# Patient Record
Sex: FEMALE | Race: WHITE | NOT HISPANIC OR LATINO | Employment: OTHER | URBAN - METROPOLITAN AREA
[De-identification: names, ages, dates, MRNs, and addresses within clinical notes are randomized per-mention and may not be internally consistent; named-entity substitution may affect disease eponyms.]

---

## 2018-03-09 DIAGNOSIS — Z01.812 PRE-PROCEDURAL LABORATORY EXAMINATION: ICD-10-CM

## 2018-03-09 DIAGNOSIS — Z01.810 PRE-OPERATIVE CARDIOVASCULAR EXAMINATION: ICD-10-CM

## 2018-03-09 LAB
ANION GAP SERPL CALC-SCNC: 7 MMOL/L (ref 0–11.9)
BUN SERPL-MCNC: 21 MG/DL (ref 8–22)
CALCIUM SERPL-MCNC: 9.3 MG/DL (ref 8.5–10.5)
CHLORIDE SERPL-SCNC: 101 MMOL/L (ref 96–112)
CO2 SERPL-SCNC: 31 MMOL/L (ref 20–33)
CREAT SERPL-MCNC: 0.59 MG/DL (ref 0.5–1.4)
ERYTHROCYTE [DISTWIDTH] IN BLOOD BY AUTOMATED COUNT: 44.8 FL (ref 35.9–50)
EST. AVERAGE GLUCOSE BLD GHB EST-MCNC: 140 MG/DL
GLUCOSE SERPL-MCNC: 130 MG/DL (ref 65–99)
HBA1C MFR BLD: 6.5 % (ref 0–5.6)
HCT VFR BLD AUTO: 43.4 % (ref 37–47)
HGB BLD-MCNC: 14.5 G/DL (ref 12–16)
MCH RBC QN AUTO: 31.9 PG (ref 27–33)
MCHC RBC AUTO-ENTMCNC: 33.4 G/DL (ref 33.6–35)
MCV RBC AUTO: 95.4 FL (ref 81.4–97.8)
PLATELET # BLD AUTO: 258 K/UL (ref 164–446)
PMV BLD AUTO: 11.8 FL (ref 9–12.9)
POTASSIUM SERPL-SCNC: 3.6 MMOL/L (ref 3.6–5.5)
RBC # BLD AUTO: 4.55 M/UL (ref 4.2–5.4)
SODIUM SERPL-SCNC: 139 MMOL/L (ref 135–145)
WBC # BLD AUTO: 6.2 K/UL (ref 4.8–10.8)

## 2018-03-09 PROCEDURE — 85027 COMPLETE CBC AUTOMATED: CPT

## 2018-03-09 PROCEDURE — 80048 BASIC METABOLIC PNL TOTAL CA: CPT

## 2018-03-09 PROCEDURE — 83036 HEMOGLOBIN GLYCOSYLATED A1C: CPT

## 2018-03-09 PROCEDURE — 36415 COLL VENOUS BLD VENIPUNCTURE: CPT

## 2018-03-09 PROCEDURE — 93005 ELECTROCARDIOGRAM TRACING: CPT

## 2018-03-09 PROCEDURE — 93010 ELECTROCARDIOGRAM REPORT: CPT | Performed by: INTERNAL MEDICINE

## 2018-03-10 LAB — EKG IMPRESSION: NORMAL

## 2018-03-13 ENCOUNTER — HOSPITAL ENCOUNTER (OUTPATIENT)
Facility: MEDICAL CENTER | Age: 83
End: 2018-03-13
Attending: OPHTHALMOLOGY | Admitting: OPHTHALMOLOGY
Payer: MEDICARE

## 2018-03-13 VITALS
OXYGEN SATURATION: 93 % | BODY MASS INDEX: 28.57 KG/M2 | HEIGHT: 64 IN | HEART RATE: 75 BPM | WEIGHT: 167.33 LBS | RESPIRATION RATE: 14 BRPM | TEMPERATURE: 97.3 F

## 2018-03-13 LAB
GLUCOSE BLD-MCNC: 124 MG/DL (ref 65–99)
GLUCOSE BLD-MCNC: 130 MG/DL (ref 65–99)

## 2018-03-13 PROCEDURE — 160036 HCHG PACU - EA ADDL 30 MINS PHASE I: Performed by: OPHTHALMOLOGY

## 2018-03-13 PROCEDURE — A6410 STERILE EYE PAD: HCPCS | Performed by: OPHTHALMOLOGY

## 2018-03-13 PROCEDURE — 160048 HCHG OR STATISTICAL LEVEL 1-5: Performed by: OPHTHALMOLOGY

## 2018-03-13 PROCEDURE — 700111 HCHG RX REV CODE 636 W/ 250 OVERRIDE (IP)

## 2018-03-13 PROCEDURE — 501425 HCHG SPONGE, WECKCEL SPEAR: Performed by: OPHTHALMOLOGY

## 2018-03-13 PROCEDURE — 160029 HCHG SURGERY MINUTES - 1ST 30 MINS LEVEL 4: Performed by: OPHTHALMOLOGY

## 2018-03-13 PROCEDURE — 160041 HCHG SURGERY MINUTES - EA ADDL 1 MIN LEVEL 4: Performed by: OPHTHALMOLOGY

## 2018-03-13 PROCEDURE — 160002 HCHG RECOVERY MINUTES (STAT): Performed by: OPHTHALMOLOGY

## 2018-03-13 PROCEDURE — 502000 HCHG MISC OR IMPLANTS RC 0278: Performed by: OPHTHALMOLOGY

## 2018-03-13 PROCEDURE — 82962 GLUCOSE BLOOD TEST: CPT

## 2018-03-13 PROCEDURE — 700101 HCHG RX REV CODE 250

## 2018-03-13 PROCEDURE — 87102 FUNGUS ISOLATION CULTURE: CPT

## 2018-03-13 PROCEDURE — 500558 HCHG EYE SHIELD W/GARTER (FOX): Performed by: OPHTHALMOLOGY

## 2018-03-13 PROCEDURE — 160009 HCHG ANES TIME/MIN: Performed by: OPHTHALMOLOGY

## 2018-03-13 PROCEDURE — 160035 HCHG PACU - 1ST 60 MINS PHASE I: Performed by: OPHTHALMOLOGY

## 2018-03-13 RX ORDER — TETRACAINE HYDROCHLORIDE 5 MG/ML
SOLUTION OPHTHALMIC
Status: DISCONTINUED | OUTPATIENT
Start: 2018-03-13 | End: 2018-03-13 | Stop reason: HOSPADM

## 2018-03-13 RX ORDER — MOXIFLOXACIN 5 MG/ML
SOLUTION/ DROPS OPHTHALMIC
Status: DISCONTINUED | OUTPATIENT
Start: 2018-03-13 | End: 2018-03-13 | Stop reason: HOSPADM

## 2018-03-13 RX ORDER — MOXIFLOXACIN 5 MG/ML
SOLUTION/ DROPS OPHTHALMIC
Status: COMPLETED
Start: 2018-03-13 | End: 2018-03-13

## 2018-03-13 RX ORDER — SODIUM CHLORIDE, SODIUM LACTATE, POTASSIUM CHLORIDE, CALCIUM CHLORIDE 600; 310; 30; 20 MG/100ML; MG/100ML; MG/100ML; MG/100ML
INJECTION, SOLUTION INTRAVENOUS CONTINUOUS
Status: DISCONTINUED | OUTPATIENT
Start: 2018-03-13 | End: 2018-03-13 | Stop reason: HOSPADM

## 2018-03-13 RX ADMIN — MOXIFLOXACIN HYDROCHLORIDE 1 DROP: 5 SOLUTION/ DROPS OPHTHALMIC at 07:05

## 2018-03-13 RX ADMIN — SODIUM CHLORIDE, SODIUM LACTATE, POTASSIUM CHLORIDE, CALCIUM CHLORIDE: 600; 310; 30; 20 INJECTION, SOLUTION INTRAVENOUS at 07:45

## 2018-03-13 ASSESSMENT — PAIN SCALES - GENERAL
PAINLEVEL_OUTOF10: 0

## 2018-03-13 NOTE — PROGRESS NOTES
0871 Pt transferred to PACU. Report received from OR RN and anesthesia. Pt responds to commands. Appears to have no distress at this time. VS stable, respirations even and unlabored. L eye patch CDI. No complaints of pain or nausea.      5195 Pt up to bathroom - able to void without difficulty. Pt up to recliner. Friend brought to bedside.        4573 Pt and friend educated on discharge instructions. Verbalized understanding. All questions answered. All belongings are with patient. Pt discharged home. Escorted to car via wheelchair.

## 2018-03-13 NOTE — DISCHARGE INSTRUCTIONS
ACTIVITY: Rest and take it easy for the first 24 hours.  A responsible adult is recommended to remain with you during that time.  It is normal to feel sleepy.  We encourage you to not do anything that requires balance, judgment or coordination.    MILD FLU-LIKE SYMPTOMS ARE NORMAL. YOU MAY EXPERIENCE GENERALIZED MUSCLE ACHES, THROAT IRRITATION, HEADACHE AND/OR SOME NAUSEA.    FOR 24 HOURS DO NOT:  Drive, operate machinery or run household appliances.  Drink beer or alcoholic beverages.   Make important decisions or sign legal documents.    SPECIAL INSTRUCTIONS: *No bending, straining, stooping, or lifting until approved by Dr. Rasmussen**    DIET: To avoid nausea, slowly advance diet as tolerated, avoiding spicy or greasy foods for the first day.  Add more substantial food to your diet according to your physician's instructions.  Babies can be fed formula or breast milk as soon as they are hungry.  INCREASE FLUIDS AND FIBER TO AVOID CONSTIPATION.    SURGICAL DRESSING/BATHING: *Keep eye shield on at all times until follow-up appt**    FOLLOW-UP APPOINTMENT:  A follow-up appointment should be arranged with your doctor in to call to schedule.    You should CALL YOUR PHYSICIAN if you develop:  Fever greater than 101 degrees F.  Pain not relieved by medication, or persistent nausea or vomiting.  Excessive bleeding (blood soaking through dressing) or unexpected drainage from the wound.  Extreme redness or swelling around the incision site, drainage of pus or foul smelling drainage.  Inability to urinate or empty your bladder within 8 hours.  Problems with breathing or chest pain.    You should call 911 if you develop problems with breathing or chest pain.  If you are unable to contact your doctor or surgical center, you should go to the nearest emergency room or urgent care center.  Physician's telephone #: *[phone]**    If any questions arise, call your doctor.  If your doctor is not available, please feel free to call  the Surgical Center at [phone].  The Center is open Monday through Friday from 7AM to 7PM.  You can also call the HEALTH HOTLINE open 24 hours/day, 7 days/week and speak to a nurse at [phone], or toll free at [phone].    A registered nurse may call you a few days after your surgery to see how you are doing after your procedure.    MEDICATIONS: Resume taking daily medication.  Take prescribed pain medication with food.  If no medication is prescribed, you may take non-aspirin pain medication if needed.  PAIN MEDICATION CAN BE VERY CONSTIPATING.  Take a stool softener or laxative such as senokot, pericolace, or milk of magnesia if needed.    Prescription given for *pt already has**.  Last pain medication given at **none*.    If your physician has prescribed pain medication that includes Acetaminophen (Tylenol), do not take additional Acetaminophen (Tylenol) while taking the prescribed medication.    Depression / Suicide Risk    As you are discharged from this [facility] facility, it is important to learn how to keep safe from harming yourself.    Recognize the warning signs:  · Abrupt changes in personality, positive or negative- including increase in energy   · Giving away possessions  · Change in eating patterns- significant weight changes-  positive or negative  · Change in sleeping patterns- unable to sleep or sleeping all the time   · Unwillingness or inability to communicate  · Depression  · Unusual sadness, discouragement and loneliness  · Talk of wanting to die  · Neglect of personal appearance   · Rebelliousness- reckless behavior  · Withdrawal from people/activities they love  · Confusion- inability to concentrate     If you or a loved one observes any of these behaviors or has concerns about self-harm, here's what you can do:  · Talk about it- your feelings and reasons for harming yourself  · Remove any means that you might use to hurt yourself (examples: pills, rope, extension cords,  firearm)  · Get professional help from the community (Mental Health, Substance Abuse, psychological counseling)  · Do not be alone:Call your Safe Contact- someone whom you trust who will be there for you.  · Call your local CRISIS HOTLINE 501-3505 or 654-826-0926  · Call your local Children's Mobile Crisis Response Team [place] (965) 074-2240 or www.[employer]  · Call the toll free National Suicide Prevention Hotlines   · National Suicide Prevention Lifeline 778-404-GBWC (9033)  · National Hope Line Network 800-SUICIDE (709-6719)

## 2018-03-13 NOTE — OP REPORT
DATE OF SERVICE:  03/13/2018    PROCEDURE PERFORMED:  Descemet stripping endothelial keratoplasty, left eye.    PREOPERATIVE DIAGNOSIS:  Corneal edema, left eye.    POSTOPERATIVE DIAGNOSIS:  Corneal edema, left eye.    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    COMPLICATIONS:  None.    BLOOD LOSS:  Minimal.    SPECIMENS:  Donor corneal rim for fungal culture only.    DESCRIPTION OF PROCEDURE:  The risks, benefits, alternatives and indication   for surgery were reviewed with the patient preoperatively and all questions   were answered, informed consent was obtained, the patient was brought to the   operating room and the left eye was prepped and draped.  A lid speculum was   placed, 2 paracenteses then placed at the limbus through which Healon was   instilled in the anterior chamber.  An inferior iridotomy was performed using   reverse Sinskey hook and a 30-gauge needle.  An 8 mm descemetorhexis with   reverse Sinskey hook was then performed and residual Healon removed from the   anterior chamber.  Attention was brought to the donor corneal tissue, which   was from the [facility] with tissue #[id number], was a   53-year-old male donor, primary cause of death is arrhythmia.  Preoperative   cell count was 3058 with a clear zone of 8 mm.  Serologies were reported from   the eye bank is negative and the graft thickness was 114 microns.  This graft   was trephinated to diameter of 8.0 mm, loaded into a mini-Busin glide and then   inserted into the chamber, was then fixated on the posterior stroma with high   pressure air fill.  Following approximately 10 minutes, air fluid exchange   was performed and a small amount of SF6 gas placed in the anterior chamber to   promote further adhesion of the graft.  At the  end of the case, the eye was   patched and shielded and the patient discharged to the postanesthesia care   unit.       ____________________________________     MD SANTI Nicolas / CONCHITA    DD:  03/13/2018 09:24:30  DT:  03/13/2018 10:00:49    D#:  [id number]  Job#:  [id number]

## 2018-04-11 LAB
FUNGUS SPEC CULT: NORMAL
SIGNIFICANT IND 70042: NORMAL
SITE SITE: NORMAL
SOURCE SOURCE: NORMAL

## 2021-01-14 DIAGNOSIS — Z23 NEED FOR VACCINATION: ICD-10-CM

## 2021-10-07 ENCOUNTER — TELEPHONE (OUTPATIENT)
Dept: MEDICAL GROUP | Facility: CLINIC | Age: 86
End: 2021-10-07

## 2021-10-07 DIAGNOSIS — D48.5 NEOPLASM OF UNCERTAIN BEHAVIOR OF SKIN: ICD-10-CM

## 2021-10-07 PROBLEM — D49.9 NEOPLASTIC DISEASE: Status: ACTIVE | Noted: 2019-09-06

## 2021-10-07 NOTE — TELEPHONE ENCOUNTER
Caller Name: Camelia Frederick  Call Back Number: [phone]    How would the patient prefer to be contacted with a response: Phone call OK to leave a detailed message     Ana called today to request a referral to see a dermatologist. Patient has a biopsy appt with MD on 10/18 but has found 2 more suspicious spot since the last visit. Wants to see a dermatologist directly and not come in for the biposy on 10/18 if ok by MD. Please advise, thank you.

## 2021-12-21 RX ORDER — ATENOLOL 50 MG/1
50 TABLET ORAL DAILY
COMMUNITY
End: 2022-12-16

## 2021-12-21 NOTE — TELEPHONE ENCOUNTER
Received request via: Pharmacy    Was the patient seen in the last year in this department? Yes. Last seen 9/15/21 by Dr Bryson    Does the patient have an active prescription (recently filled or refills available) for medication(s) requested? No

## 2021-12-22 ENCOUNTER — APPOINTMENT (RX ONLY)
Dept: URBAN - METROPOLITAN AREA CLINIC 6 | Facility: CLINIC | Age: 86
Setting detail: DERMATOLOGY
End: 2021-12-22

## 2021-12-22 DIAGNOSIS — D18.0 HEMANGIOMA: ICD-10-CM

## 2021-12-22 DIAGNOSIS — D485 NEOPLASM OF UNCERTAIN BEHAVIOR OF SKIN: ICD-10-CM

## 2021-12-22 DIAGNOSIS — L81.4 OTHER MELANIN HYPERPIGMENTATION: ICD-10-CM

## 2021-12-22 DIAGNOSIS — L82.1 OTHER SEBORRHEIC KERATOSIS: ICD-10-CM

## 2021-12-22 DIAGNOSIS — D22 MELANOCYTIC NEVI: ICD-10-CM

## 2021-12-22 PROBLEM — D48.5 NEOPLASM OF UNCERTAIN BEHAVIOR OF SKIN: Status: ACTIVE | Noted: 2021-12-22

## 2021-12-22 PROBLEM — D22.5 MELANOCYTIC NEVI OF TRUNK: Status: ACTIVE | Noted: 2021-12-22

## 2021-12-22 PROBLEM — D18.01 HEMANGIOMA OF SKIN AND SUBCUTANEOUS TISSUE: Status: ACTIVE | Noted: 2021-12-22

## 2021-12-22 PROCEDURE — 11102 TANGNTL BX SKIN SINGLE LES: CPT

## 2021-12-22 PROCEDURE — 99202 OFFICE O/P NEW SF 15 MIN: CPT | Mod: 25

## 2021-12-22 PROCEDURE — 11103 TANGNTL BX SKIN EA SEP/ADDL: CPT

## 2021-12-22 PROCEDURE — ? BIOPSY BY SHAVE METHOD

## 2021-12-22 PROCEDURE — ? COUNSELING

## 2021-12-22 RX ORDER — ATENOLOL 50 MG/1
50 TABLET ORAL DAILY
Qty: 90 TABLET | Refills: 3 | Status: SHIPPED | OUTPATIENT
Start: 2021-12-22 | End: 2022-03-22

## 2021-12-22 RX ORDER — LEVOTHYROXINE SODIUM 137 UG/1
137 TABLET ORAL
Qty: 90 TABLET | Refills: 1 | Status: SHIPPED | OUTPATIENT
Start: 2021-12-22 | End: 2022-03-22

## 2021-12-22 RX ORDER — SIMVASTATIN 20 MG
20 TABLET ORAL EVERY EVENING
Qty: 90 TABLET | Refills: 3 | Status: SHIPPED | OUTPATIENT
Start: 2021-12-22 | End: 2022-03-22

## 2021-12-22 RX ORDER — METFORMIN HYDROCHLORIDE 500 MG/1
1 TABLET, FILM COATED, EXTENDED RELEASE ORAL 2 TIMES DAILY
Qty: 180 TABLET | Refills: 3 | Status: SHIPPED | OUTPATIENT
Start: 2021-12-22 | End: 2022-12-16

## 2021-12-22 ASSESSMENT — LOCATION DETAILED DESCRIPTION DERM
LOCATION DETAILED: MID-FRONTAL SCALP
LOCATION DETAILED: SUPERIOR THORACIC SPINE
LOCATION DETAILED: RIGHT RADIAL DORSAL HAND
LOCATION DETAILED: MIDDLE STERNUM
LOCATION DETAILED: EPIGASTRIC SKIN
LOCATION DETAILED: PERIUMBILICAL SKIN
LOCATION DETAILED: LEFT DISTAL PRETIBIAL REGION
LOCATION DETAILED: LEFT ULNAR DORSAL HAND
LOCATION DETAILED: NASAL SUPRATIP

## 2021-12-22 ASSESSMENT — LOCATION ZONE DERM
LOCATION ZONE: NOSE
LOCATION ZONE: HAND
LOCATION ZONE: TRUNK
LOCATION ZONE: LEG
LOCATION ZONE: SCALP

## 2021-12-22 ASSESSMENT — LOCATION SIMPLE DESCRIPTION DERM
LOCATION SIMPLE: ABDOMEN
LOCATION SIMPLE: LEFT HAND
LOCATION SIMPLE: UPPER BACK
LOCATION SIMPLE: LEFT PRETIBIAL REGION
LOCATION SIMPLE: NOSE
LOCATION SIMPLE: CHEST
LOCATION SIMPLE: RIGHT HAND
LOCATION SIMPLE: ANTERIOR SCALP

## 2022-01-25 ENCOUNTER — APPOINTMENT (RX ONLY)
Dept: URBAN - METROPOLITAN AREA CLINIC 36 | Facility: CLINIC | Age: 87
Setting detail: DERMATOLOGY
End: 2022-01-25

## 2022-01-25 VITALS — SYSTOLIC BLOOD PRESSURE: 123 MMHG | DIASTOLIC BLOOD PRESSURE: 66 MMHG

## 2022-01-25 PROBLEM — C44.311 BASAL CELL CARCINOMA OF SKIN OF NOSE: Status: ACTIVE | Noted: 2022-01-25

## 2022-01-25 PROCEDURE — ? MOHS SURGERY

## 2022-01-25 PROCEDURE — 17311 MOHS 1 STAGE H/N/HF/G: CPT

## 2022-01-25 PROCEDURE — 14060 TIS TRNFR E/N/E/L 10 SQ CM/<: CPT

## 2022-01-25 NOTE — PROCEDURE: MOHS SURGERY
Bill For Surgical Tray: no
Pain Refusal Text: I offered to prescribe pain medication but the patient refused to take this medication.
Stage 4: Additional Anesthesia Volume In Cc: 0
Full Thickness Lip Wedge Repair (Flap) Text: Given the location of the defect and the proximity to free margins a full thickness wedge repair was deemed most appropriate.  Using a sterile surgical marker, the appropriate repair was drawn incorporating the defect and placing the expected incisions perpendicular to the vermilion border.  The vermilion border was also meticulously outlined to ensure appropriate reapproximation during the repair.  The area thus outlined was incised through and through with a #15 scalpel blade.  The muscularis and dermis were reaproximated with deep sutures following hemostasis. Care was taken to realign the vermilion border before proceeding with the superficial closure.  Once the vermilion was realigned the superfical and mucosal closure was finished.
Show Quadrant Variables In The Stage Tabs: Yes
Referring Physician (Optional): Sylvia Rutledge
Wound Care: Vaseline
Complex Repair And Graft Additional Text (Will Appearing After The Standard Complex Repair Text): The complex repair was not sufficient to completely close the primary defect. The remaining additional defect was repaired with the graft mentioned below.
Helical Rim Text: The closure involved the helical rim.
Banner Transposition Flap Text: The defect edges were debeveled with a #15 scalpel blade.  Given the location of the defect and the proximity to free margins a Banner transposition flap was deemed most appropriate.  Using a sterile surgical marker, an appropriate flap drawn around the defect. The area thus outlined was incised deep to adipose tissue with a #15 scalpel blade.  The skin margins were undermined to an appropriate distance in all directions utilizing iris scissors.
Epidermal Closure: running and interrupted
Special Stains Stage 13 - Results: Base On Clearance Noted Above
Anesthesia Type: 1% lidocaine with epinephrine
Modified Advancement Flap Text: The defect edges were debeveled with a #15 scalpel blade.  Given the location of the defect, shape of the defect and the proximity to free margins a modified advancement flap was deemed most appropriate.  Using a sterile surgical marker, an appropriate advancement flap was drawn incorporating the defect and placing the expected incisions within the relaxed skin tension lines where possible.    The area thus outlined was incised deep to adipose tissue with a #15 scalpel blade.  The skin margins were undermined to an appropriate distance in all directions utilizing iris scissors.
Surgeon: Reyna Leary MD
Orbicularis Oris Muscle Flap Text: The defect edges were debeveled with a #15 scalpel blade.  Given that the defect affected the competency of the oral sphincter an orbicularis oris muscle flap was deemed most appropriate to restore this competency and normal muscle function.  Using a sterile surgical marker, an appropriate flap was drawn incorporating the defect. The area thus outlined was incised with a #15 scalpel blade.
Provider Procedure Text (A): After obtaining clear surgical margins the defect was repaired by another provider.
Xenograft Text: The defect edges were debeveled with a #15 scalpel blade.  Given the location of the defect, shape of the defect and the proximity to free margins a xenograft was deemed most appropriate.  The graft was then trimmed to fit the size of the defect.  The graft was then placed in the primary defect and oriented appropriately.
Advancement-Rotation Flap Text: The defect edges were debeveled with a #15 scalpel blade.  Given the location of the defect, shape of the defect and the proximity to free margins an advancement-rotation flap was deemed most appropriate.  Using a sterile surgical marker, an appropriate flap was drawn incorporating the defect and placing the expected incisions within the relaxed skin tension lines where possible. The area thus outlined was incised deep to adipose tissue with a #15 scalpel blade.  The skin margins were undermined to an appropriate distance in all directions utilizing iris scissors.
Staging Info: By selecting yes to the question above you will include information on AJCC 8 tumor staging in your Mohs note. Information on tumor staging will be automatically added for SCCs on the head and neck. AJCC 8 includes tumor size, tumor depth, perineural involvement and bone invasion.
Asc Procedure Text (B): After obtaining clear surgical margins the patient was sent to an ASC for surgical repair.  The patient understands they will receive post-surgical care and follow-up from the ASC physician.
Bilobed Transposition Flap Text: The defect edges were debeveled with a #15 scalpel blade.  Given the location of the defect and the proximity to free margins a bilobed transposition flap was deemed most appropriate.  Using a sterile surgical marker, an appropriate bilobe flap drawn around the defect.    The area thus outlined was incised deep to adipose tissue with a #15 scalpel blade.  The skin margins were undermined to an appropriate distance in all directions utilizing iris scissors.
Bilobed Flap Text: The defect edges were debeveled with a #15 scalpel blade.  Given the location of the defect and the proximity to free margins a bilobe flap was deemed most appropriate.  Using a sterile surgical marker, an appropriate bilobe flap drawn around the defect.    The area thus outlined was incised deep to adipose tissue with a #15 scalpel blade.  The skin margins were undermined to an appropriate distance in all directions utilizing iris scissors.
Double M-Plasty Intermediate Repair Preamble Text (Leave Blank If You Do Not Want): Undermining was performed with blunt dissection.
Lazy S Complex Repair Preamble Text (Leave Blank If You Do Not Want): Extensive wide undermining was performed.
Is There Documentation In The Chart Showing Discussion Of Changes With Another Physician?: Please Select the Appropriate Response
Referred To Otolaryngology For Closure Text (Leave Blank If You Do Not Want): After obtaining clear surgical margins the patient was sent to otolaryngology for surgical repair.  The patient understands they will receive post-surgical care and follow-up from the referring physician's office.
Oculoplastic Surgeon Procedure Text (F): After obtaining clear surgical margins the patient was sent to oculoplastics for surgical repair.  The patient understands they will receive post-surgical care and follow-up from the referring physician's office.
Initial Size Of Lesion: 0.8
Double Island Pedicle Flap Text: The defect edges were debeveled with a #15 scalpel blade.  Given the location of the defect, shape of the defect and the proximity to free margins a double island pedicle advancement flap was deemed most appropriate.  Using a sterile surgical marker, an appropriate advancement flap was drawn incorporating the defect, outlining the appropriate donor tissue and placing the expected incisions within the relaxed skin tension lines where possible.    The area thus outlined was incised deep to adipose tissue with a #15 scalpel blade.  The skin margins were undermined to an appropriate distance in all directions around the primary defect and laterally outward around the island pedicle utilizing iris scissors.  There was minimal undermining beneath the pedicle flap.
Bilateral Helical Rim Advancement Flap Text: The defect edges were debeveled with a #15 blade scalpel.  Given the location of the defect and the proximity to free margins (helical rim) a bilateral helical rim advancement flap was deemed most appropriate.  Using a sterile surgical marker, the appropriate advancement flaps were drawn incorporating the defect and placing the expected incisions between the helical rim and antihelix where possible.  The area thus outlined was incised through and through with a #15 scalpel blade.  With a skin hook and iris scissors, the flaps were gently and sharply undermined and freed up.
Cheiloplasty (Complex) Text: A decision was made to reconstruct the defect with a  cheiloplasty.  The defect was undermined extensively.  Additional obicularis oris muscle was excised with a 15 blade scalpel.  The defect was converted into a full thickness wedge to facilite a better cosmetic result.  Small vessels were then tied off with 5-0 monocyrl. The obicularis oris, superficial fascia, adipose and dermis were then reapproximated.  After the deeper layers were approximated the epidermis was reapproximated with particular care given to realign the vermilion border.
Star Wedge Flap Text: The defect edges were debeveled with a #15 scalpel blade.  Given the location of the defect, shape of the defect and the proximity to free margins a star wedge flap was deemed most appropriate.  Using a sterile surgical marker, an appropriate rotation flap was drawn incorporating the defect and placing the expected incisions within the relaxed skin tension lines where possible. The area thus outlined was incised deep to adipose tissue with a #15 scalpel blade.  The skin margins were undermined to an appropriate distance in all directions utilizing iris scissors.
Tumor Depth: Less than 6mm from granular layer and no invasion beyond the subcutaneous fat
Muscle Hinge Flap Text: The defect edges were debeveled with a #15 scalpel blade.  Given the size, depth and location of the defect and the proximity to free margins a muscle hinge flap was deemed most appropriate.  Using a sterile surgical marker, an appropriate hinge flap was drawn incorporating the defect. The area thus outlined was incised with a #15 scalpel blade.  The skin margins were undermined to an appropriate distance in all directions utilizing iris scissors.
Spiral Flap Text: The defect edges were debeveled with a #15 scalpel blade.  Given the location of the defect, shape of the defect and the proximity to free margins a spiral flap was deemed most appropriate.  Using a sterile surgical marker, an appropriate rotation flap was drawn incorporating the defect and placing the expected incisions within the relaxed skin tension lines where possible. The area thus outlined was incised deep to adipose tissue with a #15 scalpel blade.  The skin margins were undermined to an appropriate distance in all directions utilizing iris scissors.
Post-Care Instructions: I reviewed with the patient in detail post-care instructions. Patient is not to engage in any heavy lifting, exercise, or swimming for the next 14 days. Should the patient develop any fevers, chills, bleeding, severe pain patient will contact the office immediately.
Plastic Surgeon Procedure Text (D): After obtaining clear surgical margins the patient was sent to plastics for surgical repair.  The patient understands they will receive post-surgical care and follow-up from the referring physician's office.
Mid-Level Procedure Text (F): After obtaining clear surgical margins the patient was sent to a mid-level provider for surgical repair.  The patient understands they will receive post-surgical care and follow-up from the mid-level provider.
Consent (Marginal Mandibular)/Introductory Paragraph: The rationale for Mohs was explained to the patient and consent was obtained. The risks, benefits and alternatives to therapy were discussed in detail. Specifically, the risks of damage to the marginal mandibular branch of the facial nerve, infection, scarring, bleeding, prolonged wound healing, incomplete removal, allergy to anesthesia, and recurrence were addressed. Prior to the procedure, the treatment site was clearly identified and confirmed by the patient. All components of Universal Protocol/PAUSE Rule completed.
Eye Protection Verbiage: Before proceeding with the stage, a plastic scleral shield was inserted. The globe was anesthetized with a few drops of 1% lidocaine with 1:100,000 epinephrine. Then, an appropriate sized scleral shield was chosen and coated with lacrilube ointment. The shield was gently inserted and left in place for the duration of each stage. After the stage was completed, the shield was gently removed.
Repair Anesthesia Method: local infiltration
Mucosal Advancement Flap Text: Given the location of the defect, shape of the defect and the proximity to free margins a mucosal advancement flap was deemed most appropriate. Incisions were made with a 15 blade scalpel in the appropriate fashion along the cutaneous vermilion border and the mucosal lip. The remaining actinically damaged mucosal tissue was excised.  The mucosal advancement flap was then elevated to the gingival sulcus with care taken to preserve the neurovascular structures and advanced into the primary defect. Care was taken to ensure that precise realignment of the vermilion border was achieved.
Anesthesia Volume In Cc: 8
Inflammation Suggestive Of Cancer Camouflage Histology Text: There was a dense lymphocytic infiltrate which prevented adequate histologic evaluation of adjacent structures.
Island Pedicle Flap Text: The defect edges were debeveled with a #15 scalpel blade.  Given the location of the defect, shape of the defect and the proximity to free margins an island pedicle advancement flap was deemed most appropriate.  Using a sterile surgical marker, an appropriate advancement flap was drawn incorporating the defect, outlining the appropriate donor tissue and placing the expected incisions within the relaxed skin tension lines where possible.    The area thus outlined was incised deep to adipose tissue with a #15 scalpel blade.  The skin margins were undermined to an appropriate distance in all directions around the primary defect and laterally outward around the island pedicle utilizing iris scissors.  There was minimal undermining beneath the pedicle flap.
Melolabial Transposition Flap Text: The defect edges were debeveled with a #15 scalpel blade.  Given the location of the defect and the proximity to free margins a melolabial flap was deemed most appropriate.  Using a sterile surgical marker, an appropriate melolabial transposition flap was drawn incorporating the defect.    The area thus outlined was incised deep to adipose tissue with a #15 scalpel blade.  The skin margins were undermined to an appropriate distance in all directions utilizing iris scissors.
No Repair - Repaired With Adjacent Surgical Defect Text (Leave Blank If You Do Not Want): After obtaining clear surgical margins the defect was repaired concurrently with another surgical defect which was in close approximation.
Estimated Blood Loss (Cc): minimal
Partial Purse String (Intermediate) Text: Given the location of the defect and the characteristics of the surrounding skin an intermediate purse string closure was deemed most appropriate.  Undermining was performed circumfirentially around the surgical defect.  A purse string suture was then placed and tightened. Wound tension only allowed a partial closure of the circular defect.
Hemostasis: Electrocautery
Rhomboid Transposition Flap Text: The defect edges were debeveled with a #15 scalpel blade.  Given the location of the defect and the proximity to free margins a rhomboid transposition flap was deemed most appropriate.  Using a sterile surgical marker, an appropriate rhomboid flap was drawn incorporating the defect.    The area thus outlined was incised deep to adipose tissue with a #15 scalpel blade.  The skin margins were undermined to an appropriate distance in all directions utilizing iris scissors.
Postop Diagnosis: same
Hemigard Postcare Instructions: The HEMIGARD strips are to remain completely dry for at least 5-7 days.
Purse String (Intermediate) Text: Given the location of the defect and the characteristics of the surrounding skin a purse string intermediate closure was deemed most appropriate.  Undermining was performed circumfirentially around the surgical defect.  A purse string suture was then placed and tightened.
Cartilage Graft Text: The defect edges were debeveled with a #15 scalpel blade.  Given the location of the defect, shape of the defect, the fact the defect involved a full thickness cartilage defect a cartilage graft was deemed most appropriate.  An appropriate donor site was identified, cleansed, and anesthetized. The cartilage graft was then harvested and transferred to the recipient site, oriented appropriately and then sutured into place.  The secondary defect was then repaired using a primary closure.
Subsequent Stages Histo Method Verbiage: Using a similar technique to that described above, a thin layer of tissue was removed from all areas where tumor was visible on the previous stage.  The tissue was again oriented, mapped, dyed, and processed as above.
Same Histology In Subsequent Stages Text: The pattern and morphology of the tumor is as described in the first stage.
Posterior Auricular Interpolation Flap Text: A decision was made to reconstruct the defect utilizing an interpolation axial flap and a staged reconstruction.  A telfa template was made of the defect.  This telfa template was then used to outline the posterior auricular interpolation flap.  The donor area for the pedicle flap was then injected with anesthesia.  The flap was excised through the skin and subcutaneous tissue down to the layer of the underlying musculature.  The pedicle flap was carefully excised within this deep plane to maintain its blood supply.  The edges of the donor site were undermined.   The donor site was closed in a primary fashion.  The pedicle was then rotated into position and sutured.  Once the tube was sutured into place, adequate blood supply was confirmed with blanching and refill.  The pedicle was then wrapped with xeroform gauze and dressed appropriately with a telfa and gauze bandage to ensure continued blood supply and protect the attached pedicle.
Double O-Z Flap Text: The defect edges were debeveled with a #15 scalpel blade.  Given the location of the defect, shape of the defect and the proximity to free margins a Double O-Z flap was deemed most appropriate.  Using a sterile surgical marker, an appropriate transposition flap was drawn incorporating the defect and placing the expected incisions within the relaxed skin tension lines where possible. The area thus outlined was incised deep to adipose tissue with a #15 scalpel blade.  The skin margins were undermined to an appropriate distance in all directions utilizing iris scissors.
Consent (Scalp)/Introductory Paragraph: The rationale for Mohs was explained to the patient and consent was obtained. The risks, benefits and alternatives to therapy were discussed in detail. Specifically, the risks of changes in hair growth pattern secondary to repair, infection, scarring, bleeding, prolonged wound healing, incomplete removal, allergy to anesthesia, nerve injury and recurrence were addressed. Prior to the procedure, the treatment site was clearly identified and confirmed by the patient. All components of Universal Protocol/PAUSE Rule completed.
O-T Advancement Flap Text: The defect edges were debeveled with a #15 scalpel blade.  Given the location of the defect, shape of the defect and the proximity to free margins an O-T advancement flap was deemed most appropriate.  Using a sterile surgical marker, an appropriate advancement flap was drawn incorporating the defect and placing the expected incisions within the relaxed skin tension lines where possible.    The area thus outlined was incised deep to adipose tissue with a #15 scalpel blade.  The skin margins were undermined to an appropriate distance in all directions utilizing iris scissors.
Depth Of Tumor Invasion (For Histology): dermis
Trilobed Flap Text: The defect edges were debeveled with a #15 scalpel blade.  Given the location of the defect and the proximity to free margins a trilobed flap was deemed most appropriate.  Using a sterile surgical marker, an appropriate trilobed flap drawn around the defect.    The area thus outlined was incised deep to adipose tissue with a #15 scalpel blade.  The skin margins were undermined to an appropriate distance in all directions utilizing iris scissors.
Tissue Cultured Epidermal Autograft Text: The defect edges were debeveled with a #15 scalpel blade.  Given the location of the defect, shape of the defect and the proximity to free margins a tissue cultured epidermal autograft was deemed most appropriate.  The graft was then trimmed to fit the size of the defect.  The graft was then placed in the primary defect and oriented appropriately.
Mercedes Flap Text: The defect edges were debeveled with a #15 scalpel blade.  Given the location of the defect, shape of the defect and the proximity to free margins a Mercedes flap was deemed most appropriate.  Using a sterile surgical marker, an appropriate advancement flap was drawn incorporating the defect and placing the expected incisions within the relaxed skin tension lines where possible. The area thus outlined was incised deep to adipose tissue with a #15 scalpel blade.  The skin margins were undermined to an appropriate distance in all directions utilizing iris scissors.
Number Of Hemigard Strips Per Side: 1
Ear Star Wedge Flap Text: The defect edges were debeveled with a #15 blade scalpel.  Given the location of the defect and the proximity to free margins (helical rim) an ear star wedge flap was deemed most appropriate.  Using a sterile surgical marker, the appropriate flap was drawn incorporating the defect and placing the expected incisions between the helical rim and antihelix where possible.  The area thus outlined was incised through and through with a #15 scalpel blade.
Nasalis-Muscle-Based Myocutaneous Island Pedicle Flap Text: Using a #15 blade, an incision was made around the donor flap to the level of the nasalis muscle. Wide lateral undermining was then performed in both the subcutaneous plane above the nasalis muscle, and in a submuscular plane just above periosteum. This allowed the formation of a free nasalis muscle axial pedicle (based on the angular artery) which was still attached to the actual cutaneous flap, increasing its mobility and vascular viability. Hemostasis was obtained with pinpoint electrocoagulation. The flap was mobilized into position and the pivotal anchor points positioned and stabilized with buried interrupted sutures. Subcutaneous and dermal tissues were closed in a multilayered fashion with sutures. Tissue redundancies were excised, and the epidermal edges were apposed without significant tension and sutured with sutures.
Where Do You Want The Question To Include Opioid Counseling Located?: Case Summary Tab
Hemigard Retention Suture: 0-0 Nylon
Tumor Debulked?: curette
Ear Wedge Repair Text: A wedge excision was completed by carrying down an excision through the full thickness of the ear and cartilage with an inward facing Burow's triangle. The wound was then closed in a layered fashion.
Closure 2 Information: This tab is for additional flaps and grafts, including complex repair and grafts and complex repair and flaps. You can also specify a different location for the additional defect, if the location is the same you do not need to select a new one. We will insert the automated text for the repair you select below just as we do for solitary flaps and grafts. Please note that at this time if you select a location with a different insurance zone you will need to override the ICD10 and CPT if appropriate.
Mauc Instructions: By selecting yes to the question below the MAUC number will be added into the note.  This will be calculated automatically based on the diagnosis chosen, the size entered, the body zone selected (H,M,L) and the specific indications you chose. You will also have the option to override the Mohs AUC if you disagree with the automatically calculated number and this option is found in the Case Summary tab.
Consent (Temporal Branch)/Introductory Paragraph: The rationale for Mohs was explained to the patient and consent was obtained. The risks, benefits and alternatives to therapy were discussed in detail. Specifically, the risks of damage to the temporal branch of the facial nerve, infection, scarring, bleeding, prolonged wound healing, incomplete removal, allergy to anesthesia, and recurrence were addressed. Prior to the procedure, the treatment site was clearly identified and confirmed by the patient. All components of Universal Protocol/PAUSE Rule completed.
Advancement Flap (Single) Text: The defect edges were debeveled with a #15 scalpel blade.  Given the location of the defect and the proximity to free margins a single advancement flap was deemed most appropriate.  Using a sterile surgical marker, an appropriate advancement flap was drawn incorporating the defect and placing the expected incisions within the relaxed skin tension lines where possible.    The area thus outlined was incised deep to adipose tissue with a #15 scalpel blade.  The skin margins were undermined to an appropriate distance in all directions utilizing iris scissors.
Graft Donor Site Bandage (Optional-Leave Blank If You Don't Want In Note): Aquaplast was fitted to the graft site and sewn into place. A pressure bandage were applied to the donor site and over the aquaplast bolster.
Date Of Previous Biopsy (Optional): 12-22-21
Split-Thickness Skin Graft Text: The defect edges were debeveled with a #15 scalpel blade.  Given the location of the defect, shape of the defect and the proximity to free margins a split thickness skin graft was deemed most appropriate.  Using a sterile surgical marker, the primary defect shape was transferred to the donor site. The split thickness graft was then harvested.  The skin graft was then placed in the primary defect and oriented appropriately.
Retention Suture Text: Retention sutures were placed to support the closure and prevent dehiscence.
Medical Necessity Statement: Based on my medical judgement, Mohs surgery is the most appropriate treatment for this cancer compared to other treatments.
Epidermal Autograft Text: The defect edges were debeveled with a #15 scalpel blade.  Given the location of the defect, shape of the defect and the proximity to free margins an epidermal autograft was deemed most appropriate.  Using a sterile surgical marker, the primary defect shape was transferred to the donor site. The epidermal graft was then harvested.  The skin graft was then placed in the primary defect and oriented appropriately.
Melolabial Interpolation Flap Text: A decision was made to reconstruct the defect utilizing an interpolation axial flap and a staged reconstruction.  A telfa template was made of the defect.  This telfa template was then used to outline the melolabial interpolation flap.  The donor area for the pedicle flap was then injected with anesthesia.  The flap was excised through the skin and subcutaneous tissue down to the layer of the underlying musculature.  The pedicle flap was carefully excised within this deep plane to maintain its blood supply.  The edges of the donor site were undermined.   The donor site was closed in a primary fashion.  The pedicle was then rotated into position and sutured.  Once the tube was sutured into place, adequate blood supply was confirmed with blanching and refill.  The pedicle was then wrapped with xeroform gauze and dressed appropriately with a telfa and gauze bandage to ensure continued blood supply and protect the attached pedicle.
Cheek-To-Nose Interpolation Flap Text: A decision was made to reconstruct the defect utilizing an interpolation axial flap and a staged reconstruction.  A telfa template was made of the defect.  This telfa template was then used to outline the Cheek-To-Nose Interpolation flap.  The donor area for the pedicle flap was then injected with anesthesia.  The flap was excised through the skin and subcutaneous tissue down to the layer of the underlying musculature.  The interpolation flap was carefully excised within this deep plane to maintain its blood supply.  The edges of the donor site were undermined.   The donor site was closed in a primary fashion.  The pedicle was then rotated into position and sutured.  Once the tube was sutured into place, adequate blood supply was confirmed with blanching and refill.  The pedicle was then wrapped with xeroform gauze and dressed appropriately with a telfa and gauze bandage to ensure continued blood supply and protect the attached pedicle.
Hatchet Flap Text: The defect edges were debeveled with a #15 scalpel blade.  Given the location of the defect, shape of the defect and the proximity to free margins a hatchet flap was deemed most appropriate.  Using a sterile surgical marker, an appropriate hatchet flap was drawn incorporating the defect and placing the expected incisions within the relaxed skin tension lines where possible.    The area thus outlined was incised deep to adipose tissue with a #15 scalpel blade.  The skin margins were undermined to an appropriate distance in all directions utilizing iris scissors.
Nasal Turnover Hinge Flap Text: The defect edges were debeveled with a #15 scalpel blade.  Given the size, depth, location of the defect and the defect being full thickness a nasal turnover hinge flap was deemed most appropriate.  Using a sterile surgical marker, an appropriate hinge flap was drawn incorporating the defect. The area thus outlined was incised with a #15 scalpel blade. The flap was designed to recreate the nasal mucosal lining and the alar rim. The skin margins were undermined to an appropriate distance in all directions utilizing iris scissors.
Rhombic Flap Text: The defect edges were debeveled with a #15 scalpel blade.  Given the location of the defect and the proximity to free margins a rhombic flap was deemed most appropriate.  Using a sterile surgical marker, an appropriate rhombic flap was drawn incorporating the defect.    The area thus outlined was incised deep to adipose tissue with a #15 scalpel blade.  The skin margins were undermined to an appropriate distance in all directions utilizing iris scissors.
O-T Plasty Text: The defect edges were debeveled with a #15 scalpel blade.  Given the location of the defect, shape of the defect and the proximity to free margins an O-T plasty was deemed most appropriate.  Using a sterile surgical marker, an appropriate O-T plasty was drawn incorporating the defect and placing the expected incisions within the relaxed skin tension lines where possible.    The area thus outlined was incised deep to adipose tissue with a #15 scalpel blade.  The skin margins were undermined to an appropriate distance in all directions utilizing iris scissors.
Wound Care (No Sutures): Petrolatum
Epidermal Sutures: 5-0 Surgipro
Alar Island Pedicle Flap Text: The defect edges were debeveled with a #15 scalpel blade.  Given the location of the defect, shape of the defect and the proximity to the alar rim an island pedicle advancement flap was deemed most appropriate.  Using a sterile surgical marker, an appropriate advancement flap was drawn incorporating the defect, outlining the appropriate donor tissue and placing the expected incisions within the nasal ala running parallel to the alar rim. The area thus outlined was incised with a #15 scalpel blade.  The skin margins were undermined minimally to an appropriate distance in all directions around the primary defect and laterally outward around the island pedicle utilizing iris scissors.  There was minimal undermining beneath the pedicle flap.
Manual Repair Warning Statement: We plan on removing the manually selected variable below in favor of our much easier automatic structured text blocks found in the previous tab. We decided to do this to help make the flow better and give you the full power of structured data. Manual selection is never going to be ideal in our platform and I would encourage you to avoid using manual selection from this point on, especially since I will be sunsetting this feature. It is important that you do one of two things with the customized text below. First, you can save all of the text in a word file so you can have it for future reference. Second, transfer the text to the appropriate area in the Library tab. Lastly, if there is a flap or graft type which we do not have you need to let us know right away so I can add it in before the variable is hidden. No need to panic, we plan to give you roughly 6 months to make the change.
Composite Graft Text: The defect edges were debeveled with a #15 scalpel blade.  Given the location of the defect, shape of the defect, the proximity to free margins and the fact the defect was full thickness a composite graft was deemed most appropriate.  The defect was outline and then transferred to the donor site.  A full thickness graft was then excised from the donor site. The graft was then placed in the primary defect, oriented appropriately and then sutured into place.  The secondary defect was then repaired using a primary closure.
Localized Dermabrasion With Wire Brush Text: The patient was draped in routine manner.  Localized dermabrasion using 3 x 17 mm wire brush was performed in routine manner to papillary dermis. This spot dermabrasion is being performed to complete skin cancer reconstruction. It also will eliminate the other sun damaged precancerous cells that are known to be part of the regional effect of a lifetime's worth of sun exposure. This localized dermabrasion is therapeutic and should not be considered cosmetic in any regard.
Island Pedicle Flap-Requiring Vessel Identification Text: The defect edges were debeveled with a #15 scalpel blade.  Given the location of the defect, shape of the defect and the proximity to free margins an island pedicle advancement flap was deemed most appropriate.  Using a sterile surgical marker, an appropriate advancement flap was drawn, based on the axial vessel mentioned above, incorporating the defect, outlining the appropriate donor tissue and placing the expected incisions within the relaxed skin tension lines where possible.    The area thus outlined was incised deep to adipose tissue with a #15 scalpel blade.  The skin margins were undermined to an appropriate distance in all directions around the primary defect and laterally outward around the island pedicle utilizing iris scissors.  There was minimal undermining beneath the pedicle flap.
Dressing (No Sutures): pressure dressing with telfa
Interpolation Flap Text: A decision was made to reconstruct the defect utilizing an interpolation axial flap and a staged reconstruction.  A telfa template was made of the defect.  This telfa template was then used to outline the interpolation flap.  The donor area for the pedicle flap was then injected with anesthesia.  The flap was excised through the skin and subcutaneous tissue down to the layer of the underlying musculature.  The interpolation flap was carefully excised within this deep plane to maintain its blood supply.  The edges of the donor site were undermined.   The donor site was closed in a primary fashion.  The pedicle was then rotated into position and sutured.  Once the tube was sutured into place, adequate blood supply was confirmed with blanching and refill.  The pedicle was then wrapped with xeroform gauze and dressed appropriately with a telfa and gauze bandage to ensure continued blood supply and protect the attached pedicle.
Surgeon/Pathologist Verbiage (Will Incorporate Name Of Surgeon From Intro If Not Blank): operated in two distinct and integrated capacities as the surgeon and pathologist.
Cheek Interpolation Flap Text: A decision was made to reconstruct the defect utilizing an interpolation axial flap and a staged reconstruction.  A telfa template was made of the defect.  This telfa template was then used to outline the Cheek Interpolation flap.  The donor area for the pedicle flap was then injected with anesthesia.  The flap was excised through the skin and subcutaneous tissue down to the layer of the underlying musculature.  The interpolation flap was carefully excised within this deep plane to maintain its blood supply.  The edges of the donor site were undermined.   The donor site was closed in a primary fashion.  The pedicle was then rotated into position and sutured.  Once the tube was sutured into place, adequate blood supply was confirmed with blanching and refill.  The pedicle was then wrapped with xeroform gauze and dressed appropriately with a telfa and gauze bandage to ensure continued blood supply and protect the attached pedicle.
Additional Anesthesia Volume In Cc: 6
Bcc Infiltrative Histology Text: There were numerous aggregates of basaloid cells demonstrating an infiltrative pattern.
Mastoid Interpolation Flap Text: A decision was made to reconstruct the defect utilizing an interpolation axial flap and a staged reconstruction.  A telfa template was made of the defect.  This telfa template was then used to outline the mastoid interpolation flap.  The donor area for the pedicle flap was then injected with anesthesia.  The flap was excised through the skin and subcutaneous tissue down to the layer of the underlying musculature.  The pedicle flap was carefully excised within this deep plane to maintain its blood supply.  The edges of the donor site were undermined.   The donor site was closed in a primary fashion.  The pedicle was then rotated into position and sutured.  Once the tube was sutured into place, adequate blood supply was confirmed with blanching and refill.  The pedicle was then wrapped with xeroform gauze and dressed appropriately with a telfa and gauze bandage to ensure continued blood supply and protect the attached pedicle.
Consent 2/Introductory Paragraph: Mohs surgery was explained to the patient and consent was obtained. The risks, benefits and alternatives to therapy were discussed in detail. Specifically, the risks of infection, scarring, bleeding, prolonged wound healing, incomplete removal, allergy to anesthesia, nerve injury and recurrence were addressed. Prior to the procedure, the treatment site was clearly identified and confirmed by the patient. All components of Universal Protocol/PAUSE Rule completed.
Area L Indication Text: Tumors in this location are included in Area L (trunk and extremities).  Mohs surgery is indicated for larger tumors, or tumors with aggressive histologic features, in these anatomic locations.
Partial Purse String (Simple) Text: Given the location of the defect and the characteristics of the surrounding skin a simple purse string closure was deemed most appropriate.  Undermining was performed circumfirentially around the surgical defect.  A purse string suture was then placed and tightened. Wound tension only allowed a partial closure of the circular defect.
Deep Sutures: 5-0 Maxon
Brow Lift Text: A midfrontal incision was made medially to the defect to allow access to the tissues just superior to the left eyebrow. Following careful dissection inferiorly in a supraperiosteal plane to the level of the left eyebrow, several 3-0 monocryl sutures were used to resuspend the eyebrow orbicularis oculi muscular unit to the superior frontal bone periosteum. This resulted in an appropriate reapproximation of static eyebrow symmetry and correction of the left brow ptosis.
Surgical Defect Width In Cm (Optional): 0.9
Area H Indication Text: Tumors in this location are included in Area H (eyelids, eyebrows, nose, lips, chin, ear, pre-auricular, post-auricular, temple, genitalia, hands, feet, ankles and areola).  Tissue conservation is critical in these anatomic locations.
Consent (Near Eyelid Margin)/Introductory Paragraph: The rationale for Mohs was explained to the patient and consent was obtained. The risks, benefits and alternatives to therapy were discussed in detail. Specifically, the risks of ectropion or eyelid deformity, infection, scarring, bleeding, prolonged wound healing, incomplete removal, allergy to anesthesia, nerve injury and recurrence were addressed. Prior to the procedure, the treatment site was clearly identified and confirmed by the patient. All components of Universal Protocol/PAUSE Rule completed.
Consent (Ear)/Introductory Paragraph: The rationale for Mohs was explained to the patient and consent was obtained. The risks, benefits and alternatives to therapy were discussed in detail. Specifically, the risks of ear deformity, infection, scarring, bleeding, prolonged wound healing, incomplete removal, allergy to anesthesia, nerve injury and recurrence were addressed. Prior to the procedure, the treatment site was clearly identified and confirmed by the patient. All components of Universal Protocol/PAUSE Rule completed.
Double O-Z Plasty Text: The defect edges were debeveled with a #15 scalpel blade.  Given the location of the defect, shape of the defect and the proximity to free margins a Double O-Z plasty (double transposition flap) was deemed most appropriate.  Using a sterile surgical marker, the appropriate transposition flaps were drawn incorporating the defect and placing the expected incisions within the relaxed skin tension lines where possible. The area thus outlined was incised deep to adipose tissue with a #15 scalpel blade.  The skin margins were undermined to an appropriate distance in all directions utilizing iris scissors.  Hemostasis was achieved with electrocautery.  The flaps were then transposed into place, one clockwise and the other counterclockwise, and anchored with interrupted buried subcutaneous sutures.
Consent 3/Introductory Paragraph: I gave the patient a chance to ask questions they had about the procedure.  Following this I explained the Mohs procedure and consent was obtained. The risks, benefits and alternatives to therapy were discussed in detail. Specifically, the risks of infection, scarring, bleeding, prolonged wound healing, incomplete removal, allergy to anesthesia, nerve injury and recurrence were addressed. Prior to the procedure, the treatment site was clearly identified and confirmed by the patient. All components of Universal Protocol/PAUSE Rule completed.
Graft Donor Site Dermal Sutures (Optional): 5-0 Polysorb
Undermining Type: Entire Wound
Bi-Rhombic Flap Text: The defect edges were debeveled with a #15 scalpel blade.  Given the location of the defect and the proximity to free margins a bi-rhombic flap was deemed most appropriate.  Using a sterile surgical marker, an appropriate rhombic flap was drawn incorporating the defect. The area thus outlined was incised deep to adipose tissue with a #15 scalpel blade.  The skin margins were undermined to an appropriate distance in all directions utilizing iris scissors.
Donor Site Anesthesia Type: same as repair anesthesia
Complex Repair And Flap Additional Text (Will Appearing After The Standard Complex Repair Text): The complex repair was not sufficient to completely close the primary defect. The remaining additional defect was repaired with the flap mentioned below.
Information: Selecting Yes will display possible errors in your note based on the variables you have selected. This validation is only offered as a suggestion for you. PLEASE NOTE THAT THE VALIDATION TEXT WILL BE REMOVED WHEN YOU FINALIZE YOUR NOTE. IF YOU WANT TO FAX A PRELIMINARY NOTE YOU WILL NEED TO TOGGLE THIS TO 'NO' IF YOU DO NOT WANT IT IN YOUR FAXED NOTE.
Alternatives Discussed Intro (Do Not Add Period): I discussed alternative treatments to Mohs surgery and specifically discussed the risks and benefits of
O-Z Plasty Text: The defect edges were debeveled with a #15 scalpel blade.  Given the location of the defect, shape of the defect and the proximity to free margins an O-Z plasty (double transposition flap) was deemed most appropriate.  Using a sterile surgical marker, the appropriate transposition flaps were drawn incorporating the defect and placing the expected incisions within the relaxed skin tension lines where possible.    The area thus outlined was incised deep to adipose tissue with a #15 scalpel blade.  The skin margins were undermined to an appropriate distance in all directions utilizing iris scissors.  Hemostasis was achieved with electrocautery.  The flaps were then transposed into place, one clockwise and the other counterclockwise, and anchored with interrupted buried subcutaneous sutures.
Crescentic Advancement Flap Text: The defect edges were debeveled with a #15 scalpel blade.  Given the location of the defect and the proximity to free margins a crescentic advancement flap was deemed most appropriate.  Using a sterile surgical marker, the appropriate advancement flap was drawn incorporating the defect and placing the expected incisions within the relaxed skin tension lines where possible.    The area thus outlined was incised deep to adipose tissue with a #15 scalpel blade.  The skin margins were undermined to an appropriate distance in all directions utilizing iris scissors.
Detail Level: Detailed
Vermilion Border Text: The closure involved the vermilion border.
Suture Removal: 7 days
Tarsorrhaphy Text: A tarsorrhaphy was performed using Frost sutures.
Mart-1 - Negative Histology Text: MART-1 staining demonstrates a normal density and pattern of melanocytes along the dermal-epidermal junction. The surgical margins are negative for tumor cells.
V-Y Plasty Text: The defect edges were debeveled with a #15 scalpel blade.  Given the location of the defect, shape of the defect and the proximity to free margins an V-Y advancement flap was deemed most appropriate.  Using a sterile surgical marker, an appropriate advancement flap was drawn incorporating the defect and placing the expected incisions within the relaxed skin tension lines where possible.    The area thus outlined was incised deep to adipose tissue with a #15 scalpel blade.  The skin margins were undermined to an appropriate distance in all directions utilizing iris scissors.
Epidermal Closure Graft Donor Site (Optional): running
Paramedian Forehead Flap Text: A decision was made to reconstruct the defect utilizing an interpolation axial flap and a staged reconstruction.  A telfa template was made of the defect.  This telfa template was then used to outline the paramedian forehead pedicle flap.  The donor area for the pedicle flap was then injected with anesthesia.  The flap was excised through the skin and subcutaneous tissue down to the layer of the underlying musculature.  The pedicle flap was carefully excised within this deep plane to maintain its blood supply.  The edges of the donor site were undermined.   The donor site was closed in a primary fashion.  The pedicle was then rotated into position and sutured.  Once the tube was sutured into place, adequate blood supply was confirmed with blanching and refill.  The pedicle was then wrapped with xeroform gauze and dressed appropriately with a telfa and gauze bandage to ensure continued blood supply and protect the attached pedicle.
Retention Suture Bite Size: 1 mm
Burow's Advancement Flap Text: The defect edges were debeveled with a #15 scalpel blade.  Given the location of the defect and the proximity to free margins a Burow's advancement flap was deemed most appropriate.  Using a sterile surgical marker, the appropriate advancement flap was drawn incorporating the defect and placing the expected incisions within the relaxed skin tension lines where possible.    The area thus outlined was incised deep to adipose tissue with a #15 scalpel blade.  The skin margins were undermined to an appropriate distance in all directions utilizing iris scissors.
Surgical Defect Length In Cm (Optional): 1.0
Purse String (Simple) Text: Given the location of the defect and the characteristics of the surrounding skin a purse string closure was deemed most appropriate.  Undermining was performed circumfirentially around the surgical defect.  A purse string suture was then placed and tightened.
Consent (Spinal Accessory)/Introductory Paragraph: The rationale for Mohs was explained to the patient and consent was obtained. The risks, benefits and alternatives to therapy were discussed in detail. Specifically, the risks of damage to the spinal accessory nerve, infection, scarring, bleeding, prolonged wound healing, incomplete removal, allergy to anesthesia, and recurrence were addressed. Prior to the procedure, the treatment site was clearly identified and confirmed by the patient. All components of Universal Protocol/PAUSE Rule completed.
Wound Check: 6 weeks
Dermal Autograft Text: The defect edges were debeveled with a #15 scalpel blade.  Given the location of the defect, shape of the defect and the proximity to free margins a dermal autograft was deemed most appropriate.  Using a sterile surgical marker, the primary defect shape was transferred to the donor site. The area thus outlined was incised deep to adipose tissue with a #15 scalpel blade.  The harvested graft was then trimmed of adipose and epidermal tissue until only dermis was left.  The skin graft was then placed in the primary defect and oriented appropriately.
Repair Type: Flap
Flap Type: Bilobed Flap
Consent 1/Introductory Paragraph: The rationale for Mohs was explained to the patient and consent was obtained. The risks, benefits and alternatives to therapy were discussed in detail. Specifically, the risks of infection, scarring, bleeding, prolonged wound healing, incomplete removal, allergy to anesthesia, nerve injury and recurrence were addressed. Prior to the procedure, the treatment site was clearly identified and confirmed by the patient. All components of Universal Protocol/PAUSE Rule completed.
Bcc Histology Text: There were numerous aggregates of basaloid cells.
Debridement Text: The wound edges were debrided prior to proceeding with the closure to facilitate wound healing.
A-T Advancement Flap Text: The defect edges were debeveled with a #15 scalpel blade.  Given the location of the defect, shape of the defect and the proximity to free margins an A-T advancement flap was deemed most appropriate.  Using a sterile surgical marker, an appropriate advancement flap was drawn incorporating the defect and placing the expected incisions within the relaxed skin tension lines where possible.    The area thus outlined was incised deep to adipose tissue with a #15 scalpel blade.  The skin margins were undermined to an appropriate distance in all directions utilizing iris scissors.
Secondary Defect Width In Cm (Required For Flaps): 1.8
Nostril Rim Text: The closure involved the nostril rim.
Rotation Flap Text: The defect edges were debeveled with a #15 scalpel blade.  Given the location of the defect, shape of the defect and the proximity to free margins a rotation flap was deemed most appropriate.  Using a sterile surgical marker, an appropriate rotation flap was drawn incorporating the defect and placing the expected incisions within the relaxed skin tension lines where possible.    The area thus outlined was incised deep to adipose tissue with a #15 scalpel blade.  The skin margins were undermined to an appropriate distance in all directions utilizing iris scissors.
Non-Graft Cartilage Fenestration Text: The cartilage was fenestrated with a 2mm punch biopsy to help facilitate healing.
Mohs Method Verbiage: An incision at a 45 degree angle following the standard Mohs approach was done and the specimen was harvested as a microscopic controlled layer.
Burow's Graft Text: The defect edges were debeveled with a #15 scalpel blade.  Given the location of the defect, shape of the defect, the proximity to free margins and the presence of a standing cone deformity a Burow's skin graft was deemed most appropriate. The standing cone was removed and this tissue was then trimmed to the shape of the primary defect. The adipose tissue was also removed until only dermis and epidermis were left.  The skin margins of the secondary defect were undermined to an appropriate distance in all directions utilizing iris scissors.  The secondary defect was closed with interrupted buried subcutaneous sutures.  The skin edges were then re-apposed with running  sutures.  The skin graft was then placed in the primary defect and oriented appropriately.
Helical Rim Advancement Flap Text: The defect edges were debeveled with a #15 blade scalpel.  Given the location of the defect and the proximity to free margins (helical rim) a double helical rim advancement flap was deemed most appropriate.  Using a sterile surgical marker, the appropriate advancement flaps were drawn incorporating the defect and placing the expected incisions between the helical rim and antihelix where possible.  The area thus outlined was incised through and through with a #15 scalpel blade.  With a skin hook and iris scissors, the flaps were gently and sharply undermined and freed up.
Peng Advancement Flap Text: The defect edges were debeveled with a #15 scalpel blade.  Given the location of the defect, shape of the defect and the proximity to free margins a Peng advancement flap was deemed most appropriate.  Using a sterile surgical marker, an appropriate advancement flap was drawn incorporating the defect and placing the expected incisions within the relaxed skin tension lines where possible. The area thus outlined was incised deep to adipose tissue with a #15 scalpel blade.  The skin margins were undermined to an appropriate distance in all directions utilizing iris scissors.
Closure 3 Information: This tab is for additional flaps and grafts above and beyond our usual structured repairs.  Please note if you enter information here it will not currently bill and you will need to add the billing information manually.
Repair Hemostasis (Optional): Pinpoint electrocautery
Island Pedicle Flap With Canthal Suspension Text: The defect edges were debeveled with a #15 scalpel blade.  Given the location of the defect, shape of the defect and the proximity to free margins an island pedicle advancement flap was deemed most appropriate.  Using a sterile surgical marker, an appropriate advancement flap was drawn incorporating the defect, outlining the appropriate donor tissue and placing the expected incisions within the relaxed skin tension lines where possible. The area thus outlined was incised deep to adipose tissue with a #15 scalpel blade.  The skin margins were undermined to an appropriate distance in all directions around the primary defect and laterally outward around the island pedicle utilizing iris scissors.  There was minimal undermining beneath the pedicle flap. A suspension suture was placed in the canthal tendon to prevent tension and prevent ectropion.
Home Suture Removal Text: Patient was provided instructions on removing sutures and will remove their sutures at home.  If they have any questions or difficulties they will call the office.
Transposition Flap Text: The defect edges were debeveled with a #15 scalpel blade.  Given the location of the defect and the proximity to free margins a transposition flap was deemed most appropriate.  Using a sterile surgical marker, an appropriate transposition flap was drawn incorporating the defect.    The area thus outlined was incised deep to adipose tissue with a #15 scalpel blade.  The skin margins were undermined to an appropriate distance in all directions utilizing iris scissors.
Perineural Invasion (For Histology - Be Specific If Possible): absent
Consent Type: Consent 1 (Standard)
Consent (Nose)/Introductory Paragraph: The rationale for Mohs was explained to the patient and consent was obtained. The risks, benefits and alternatives to therapy were discussed in detail. Specifically, the risks of nasal deformity, changes in the flow of air through the nose, infection, scarring, bleeding, prolonged wound healing, incomplete removal, allergy to anesthesia, nerve injury and recurrence were addressed. Prior to the procedure, the treatment site was clearly identified and confirmed by the patient. All components of Universal Protocol/PAUSE Rule completed.
Additional Deep Sutures: 4-0 Polysorb
Consent (Lip)/Introductory Paragraph: The rationale for Mohs was explained to the patient and consent was obtained. The risks, benefits and alternatives to therapy were discussed in detail. Specifically, the risks of lip deformity, changes in the oral aperture, infection, scarring, bleeding, prolonged wound healing, incomplete removal, allergy to anesthesia, nerve injury and recurrence were addressed. Prior to the procedure, the treatment site was clearly identified and confirmed by the patient. All components of Universal Protocol/PAUSE Rule completed.
O-Z Flap Text: The defect edges were debeveled with a #15 scalpel blade.  Given the location of the defect, shape of the defect and the proximity to free margins an O-Z flap was deemed most appropriate.  Using a sterile surgical marker, an appropriate transposition flap was drawn incorporating the defect and placing the expected incisions within the relaxed skin tension lines where possible. The area thus outlined was incised deep to adipose tissue with a #15 scalpel blade.  The skin margins were undermined to an appropriate distance in all directions utilizing iris scissors.
Skin Substitute Text: The defect edges were debeveled with a #15 scalpel blade.  Given the location of the defect, shape of the defect and the proximity to free margins a skin substitute graft was deemed most appropriate.  The graft material was trimmed to fit the size of the defect. The graft was then placed in the primary defect and oriented appropriately.
Graft Cartilage Fenestration Text: The cartilage was fenestrated with a 2mm punch biopsy to help facilitate graft survival and healing.
Ftsg Text: The defect edges were debeveled with a #15 scalpel blade.  Given the location of the defect, shape of the defect and the proximity to free margins a full thickness skin graft was deemed most appropriate.  Using a sterile surgical marker, the primary defect shape was transferred to the donor site. The area thus outlined was incised deep to adipose tissue with a #15 scalpel blade.  The harvested graft was then trimmed of adipose tissue until only dermis and epidermis was left.  The skin margins of the secondary defect were undermined to an appropriate distance in all directions utilizing iris scissors.  The secondary defect was closed with interrupted buried subcutaneous sutures.  The skin edges were then re-apposed with running  sutures.  The skin graft was then placed in the primary defect and oriented appropriately.
H Plasty Text: Given the location of the defect, shape of the defect and the proximity to free margins a H-plasty was deemed most appropriate for repair.  Using a sterile surgical marker, the appropriate advancement arms of the H-plasty were drawn incorporating the defect and placing the expected incisions within the relaxed skin tension lines where possible. The area thus outlined was incised deep to adipose tissue with a #15 scalpel blade. The skin margins were undermined to an appropriate distance in all directions utilizing iris scissors.  The opposing advancement arms were then advanced into place in opposite direction and anchored with interrupted buried subcutaneous sutures.
Anesthesia Volume In Cc: 9
Suturegard Intro: Intraoperative tissue expansion was performed, utilizing the SUTUREGARD device, in order to reduce wound tension.
X Size Of Lesion In Cm (Optional): 0.6
Area M Indication Text: Tumors in this location are included in Area M (cheek, forehead, scalp, neck, jawline and pretibial skin).  Mohs surgery is indicated for tumors in these anatomic locations.
No Residual Tumor Seen Histology Text: There were no malignant cells seen in the sections examined.
Secondary Intention Text (Leave Blank If You Do Not Want): The defect will heal with secondary intention.
Mart-1 - Positive Histology Text: MART-1 staining demonstrates areas of higher density and clustering of melanocytes with Pagetoid spread upwards within the epidermis. The surgical margins are positive for tumor cells.
Hemigard Intro: Due to skin fragility and wound tension, it was decided to use HEMIGARD adhesive retention suture devices to permit a linear closure. The skin was cleaned and dried for a 6cm distance away from the wound. Excessive hair, if present, was removed to allow for adhesion.
Previous Accession (Optional): [id number]
S Plasty Text: Given the location and shape of the defect, and the orientation of relaxed skin tension lines, an S-plasty was deemed most appropriate for repair.  Using a sterile surgical marker, the appropriate outline of the S-plasty was drawn, incorporating the defect and placing the expected incisions within the relaxed skin tension lines where possible.  The area thus outlined was incised deep to adipose tissue with a #15 scalpel blade.  The skin margins were undermined to an appropriate distance in all directions utilizing iris scissors. The skin flaps were advanced over the defect.  The opposing margins were then approximated with interrupted buried subcutaneous sutures.
Zygomaticofacial Flap Text: Given the location of the defect, shape of the defect and the proximity to free margins a zygomaticofacial flap was deemed most appropriate for repair.  Using a sterile surgical marker, the appropriate flap was drawn incorporating the defect and placing the expected incisions within the relaxed skin tension lines where possible. The area thus outlined was incised deep to adipose tissue with a #15 scalpel blade with preservation of a vascular pedicle.  The skin margins were undermined to an appropriate distance in all directions utilizing iris scissors.  The flap was then placed into the defect and anchored with interrupted buried subcutaneous sutures.
Unna Boot Text: An Unna boot was placed to help immobilize the limb and facilitate more rapid healing.
O-L Flap Text: The defect edges were debeveled with a #15 scalpel blade.  Given the location of the defect, shape of the defect and the proximity to free margins an O-L flap was deemed most appropriate.  Using a sterile surgical marker, an appropriate advancement flap was drawn incorporating the defect and placing the expected incisions within the relaxed skin tension lines where possible.    The area thus outlined was incised deep to adipose tissue with a #15 scalpel blade.  The skin margins were undermined to an appropriate distance in all directions utilizing iris scissors.
Mohs Case Number: [id number]
Keystone Flap Text: The defect edges were debeveled with a #15 scalpel blade.  Given the location of the defect, shape of the defect a keystone flap was deemed most appropriate.  Using a sterile surgical marker, an appropriate keystone flap was drawn incorporating the defect, outlining the appropriate donor tissue and placing the expected incisions within the relaxed skin tension lines where possible. The area thus outlined was incised deep to adipose tissue with a #15 scalpel blade.  The skin margins were undermined to an appropriate distance in all directions around the primary defect and laterally outward around the flap utilizing iris scissors.
Cheiloplasty (Less Than 50%) Text: A decision was made to reconstruct the defect with a  cheiloplasty.  The defect was undermined extensively.  Additional obicularis oris muscle was excised with a 15 blade scalpel.  The defect was converted into a full thickness wedge, of less than 50% of the vertical height of the lip, to facilite a better cosmetic result.  Small vessels were then tied off with 5-0 monocyrl. The obicularis oris, superficial fascia, adipose and dermis were then reapproximated.  After the deeper layers were approximated the epidermis was reapproximated with particular care given to realign the vermilion border.
Undermining Location (Optional): in the superficial subcutaneous fat
Advancement Flap (Double) Text: The defect edges were debeveled with a #15 scalpel blade.  Given the location of the defect and the proximity to free margins a double advancement flap was deemed most appropriate.  Using a sterile surgical marker, the appropriate advancement flaps were drawn incorporating the defect and placing the expected incisions within the relaxed skin tension lines where possible.    The area thus outlined was incised deep to adipose tissue with a #15 scalpel blade.  The skin margins were undermined to an appropriate distance in all directions utilizing iris scissors.
Z Plasty Text: The lesion was extirpated to the level of the fat with a #15 scalpel blade.  Given the location of the defect, shape of the defect and the proximity to free margins a Z-plasty was deemed most appropriate for repair.  Using a sterile surgical marker, the appropriate transposition arms of the Z-plasty were drawn incorporating the defect and placing the expected incisions within the relaxed skin tension lines where possible.    The area thus outlined was incised deep to adipose tissue with a #15 scalpel blade.  The skin margins were undermined to an appropriate distance in all directions utilizing iris scissors.  The opposing transposition arms were then transposed into place in opposite direction and anchored with interrupted buried subcutaneous sutures.
Mohs Rapid Report Verbiage: The area of clinically evident tumor was marked with skin marking ink and appropriately hatched.  The initial incision was made following the Mohs approach through the skin.  The specimen was taken to the lab, divided into the necessary number of pieces, chromacoded and processed according to the Mohs protocol.  This was repeated in successive stages until a tumor free defect was achieved.
Mohs Histo Method Verbiage: Each section was then chromacoded and processed in the Mohs lab using the Mohs protocol and submitted for frozen section.
Dorsal Nasal Flap Text: The defect edges were debeveled with a #15 scalpel blade.  Given the location of the defect and the proximity to free margins a dorsal nasal flap was deemed most appropriate.  Using a sterile surgical marker, an appropriate dorsal nasal flap was drawn around the defect.    The area thus outlined was incised deep to adipose tissue with a #15 scalpel blade.  The skin margins were undermined to an appropriate distance in all directions utilizing iris scissors.
Chonodrocutaneous Helical Advancement Flap Text: The defect edges were debeveled with a #15 scalpel blade.  Given the location of the defect and the proximity to free margins a chondrocutaneous helical advancement flap was deemed most appropriate.  Using a sterile surgical marker, the appropriate advancement flap was drawn incorporating the defect and placing the expected incisions within the relaxed skin tension lines where possible.    The area thus outlined was incised deep to adipose tissue with a #15 scalpel blade.  The skin margins were undermined to an appropriate distance in all directions utilizing iris scissors.
Suturegard Body: The suture ends were repeatedly re-tightened and re-clamped to achieve the desired tissue expansion.
W Plasty Text: The lesion was extirpated to the level of the fat with a #15 scalpel blade.  Given the location of the defect, shape of the defect and the proximity to free margins a W-plasty was deemed most appropriate for repair.  Using a sterile surgical marker, the appropriate transposition arms of the W-plasty were drawn incorporating the defect and placing the expected incisions within the relaxed skin tension lines where possible.    The area thus outlined was incised deep to adipose tissue with a #15 scalpel blade.  The skin margins were undermined to an appropriate distance in all directions utilizing iris scissors.  The opposing transposition arms were then transposed into place in opposite direction and anchored with interrupted buried subcutaneous sutures.
V-Y Flap Text: The defect edges were debeveled with a #15 scalpel blade.  Given the location of the defect, shape of the defect and the proximity to free margins a V-Y flap was deemed most appropriate.  Using a sterile surgical marker, an appropriate advancement flap was drawn incorporating the defect and placing the expected incisions within the relaxed skin tension lines where possible.    The area thus outlined was incised deep to adipose tissue with a #15 scalpel blade.  The skin margins were undermined to an appropriate distance in all directions utilizing iris scissors.
Mustarde Flap Text: The defect edges were debeveled with a #15 scalpel blade.  Given the size, depth and location of the defect and the proximity to free margins a Mustarde flap was deemed most appropriate.  Using a sterile surgical marker, an appropriate flap was drawn incorporating the defect. The area thus outlined was incised with a #15 scalpel blade.  The skin margins were undermined to an appropriate distance in all directions utilizing iris scissors.
Adjacent Tissue Transfer Text: The defect edges were debeveled with a #15 scalpel blade.  Given the location of the defect and the proximity to free margins an adjacent tissue transfer was deemed most appropriate.  Using a sterile surgical marker, an appropriate flap was drawn incorporating the defect and placing the expected incisions within the relaxed skin tension lines where possible.    The area thus outlined was incised deep to adipose tissue with a #15 scalpel blade.  The skin margins were undermined to an appropriate distance in all directions utilizing iris scissors.
Staged Advancement Flap Text: The defect edges were debeveled with a #15 scalpel blade.  Given the location of the defect, shape of the defect and the proximity to free margins a staged advancement flap was deemed most appropriate.  Using a sterile surgical marker, an appropriate advancement flap was drawn incorporating the defect and placing the expected incisions within the relaxed skin tension lines where possible. The area thus outlined was incised deep to adipose tissue with a #15 scalpel blade.  The skin margins were undermined to an appropriate distance in all directions utilizing iris scissors.

## 2022-02-03 ENCOUNTER — APPOINTMENT (RX ONLY)
Dept: URBAN - METROPOLITAN AREA CLINIC 36 | Facility: CLINIC | Age: 87
Setting detail: DERMATOLOGY
End: 2022-02-03

## 2022-02-03 DIAGNOSIS — Z48.02 ENCOUNTER FOR REMOVAL OF SUTURES: ICD-10-CM

## 2022-02-03 PROCEDURE — ? SUTURE REMOVAL (GLOBAL PERIOD)

## 2022-02-03 PROCEDURE — 99024 POSTOP FOLLOW-UP VISIT: CPT

## 2022-02-03 ASSESSMENT — LOCATION DETAILED DESCRIPTION DERM: LOCATION DETAILED: NASAL DORSUM

## 2022-02-03 ASSESSMENT — LOCATION ZONE DERM: LOCATION ZONE: NOSE

## 2022-02-03 ASSESSMENT — LOCATION SIMPLE DESCRIPTION DERM: LOCATION SIMPLE: NOSE

## 2022-02-03 NOTE — PROCEDURE: SUTURE REMOVAL (GLOBAL PERIOD)
Detail Level: Detailed
Add 94436 Cpt? (Important Note: In 2017 The Use Of 55340 Is Being Tracked By Cms To Determine Future Global Period Reimbursement For Global Periods): yes

## 2022-03-02 ENCOUNTER — OFFICE VISIT (OUTPATIENT)
Dept: URGENT CARE | Facility: CLINIC | Age: 87
End: 2022-03-02
Payer: MEDICARE

## 2022-03-02 VITALS
WEIGHT: 176.4 LBS | RESPIRATION RATE: 16 BRPM | BODY MASS INDEX: 30.11 KG/M2 | HEART RATE: 76 BPM | SYSTOLIC BLOOD PRESSURE: 120 MMHG | OXYGEN SATURATION: 92 % | TEMPERATURE: 98 F | DIASTOLIC BLOOD PRESSURE: 88 MMHG | HEIGHT: 64 IN

## 2022-03-02 DIAGNOSIS — H61.21 IMPACTED CERUMEN OF RIGHT EAR: ICD-10-CM

## 2022-03-02 PROCEDURE — 69210 REMOVE IMPACTED EAR WAX UNI: CPT | Performed by: PHYSICIAN ASSISTANT

## 2022-03-02 PROCEDURE — 99213 OFFICE O/P EST LOW 20 MIN: CPT | Mod: 25 | Performed by: PHYSICIAN ASSISTANT

## 2022-03-02 ASSESSMENT — ENCOUNTER SYMPTOMS
VOMITING: 0
NAUSEA: 0
CHILLS: 0
DIZZINESS: 0
FEVER: 0
MYALGIAS: 0

## 2022-03-02 NOTE — PROGRESS NOTES
"Subjective     Camelia Frederick is a 89 y.o. female who presents with Middle Ear Problem ((R) ear impacted wax, [facility] informed pt when getting hearing aids. )            The patient is here today requesting her earwax be removed. She was seen at [facility] when inquiring about hearing aids. She was told her right ear was completely full and her left ear had some wax. She denies fever or chills. No ear pain or hearing changes from baseline. No ear drainage.       Past Medical History:   Diagnosis Date   • Anemia     \"In the past\"   • Blood clotting disorder (HCC) 03/09/2018    Left eye 2 years ago.   • Breath shortness 03/09/2018    Occasional with exertion   • Cataract     bilateral IOL   • Cold 03/09/2018    Pt states feels like there may be phlem building up in her throat, feels like \"I might be coming down with something\".  Pt instructed to call Dr. Rasmussen's office if she develops a sore throat, cold and/or fever.  Pt verbalized an understanding.   • Dental disorder     lower partial denture   • Diabetes     oral medication   • High cholesterol    • Hypercholesteremia    • Hypertension    • Hypothyroid     thyroidectomy   • Renal disorder 1990's    stones       Past Surgical History:   Procedure Laterality Date   • PENETRATING KERATOPLASTY Left 3/13/2018    Procedure: DESCEMETS STRIPPING ENDOTHELIAL KERATOPLASTY;  Surgeon: Guille Rasmussen M.D.;  Location: SURGERY SAME DAY [facility];  Service: Ophthalmology   • PTOSIS REPAIR Bilateral 10/5/2016    Procedure: PTOSIS REPAIR upper lid (levator advanced set up);  Surgeon: Silvino Seay M.D.;  Location: SURGERY [facility];  Service:    • EYE SURGERY Left 2015    Retinal repair \"blood clot removed\"   • THYROIDECTOMY  1980's   • LITHOTRIPSY  1980's    kidney stone   • TONSILLECTOMY  1944   • CATARACT EXTRACTION WITH IOL Bilateral 2015/2016       No family history on file.    Allergies   Allergen Reactions   • Codeine Nausea   • Nickel " "Rash     Rash       Medications, Allergies, and current problem list reviewed today in Epic      Review of Systems   Constitutional: Negative for chills, fever and malaise/fatigue.   HENT: Negative for ear discharge and ear pain.    Gastrointestinal: Negative for nausea and vomiting.   Musculoskeletal: Negative for myalgias.   Neurological: Negative for dizziness.         All other systems reviewed and are negative.         Objective     /88   Pulse 76   Temp 36.7 °C (98 °F)   Resp 16   Ht 1.626 m (5' 4\")   Wt 80 kg (176 lb 6.4 oz)   SpO2 92%   BMI 30.28 kg/m²      Physical Exam  Constitutional:       General: She is not in acute distress.     Appearance: She is not ill-appearing.   HENT:      Right Ear: There is impacted cerumen.      Left Ear: Ear canal and external ear normal. There is impacted cerumen (partial ).   Eyes:      Conjunctiva/sclera: Conjunctivae normal.   Cardiovascular:      Rate and Rhythm: Normal rate.   Pulmonary:      Effort: Pulmonary effort is normal. No respiratory distress.   Skin:     General: Skin is warm and dry.   Neurological:      General: No focal deficit present.      Mental Status: She is alert and oriented to person, place, and time.   Psychiatric:         Mood and Affect: Mood normal.         Behavior: Behavior normal.         Thought Content: Thought content normal.         Judgment: Judgment normal.                             Assessment & Plan        1. Impacted cerumen of right ear    Bilateral ear lavage- Right ear lavaged by me. Curette used to remove cerumen by me. No signs of otitis externa.    Differential diagnoses, Supportive care, and indications for immediate follow-up discussed with patient.   Pathogenesis of diagnosis discussed including typical length and natural progression.   Instructed to return to clinic or nearest emergency department for any change in condition, further concerns, or worsening of symptoms.    The patient demonstrated a good " understanding and agreed with the treatment plan.    Lilly Villafana P.A.-C.

## 2022-03-17 ENCOUNTER — APPOINTMENT (RX ONLY)
Dept: URBAN - METROPOLITAN AREA CLINIC 36 | Facility: CLINIC | Age: 87
Setting detail: DERMATOLOGY
End: 2022-03-17

## 2022-03-17 DIAGNOSIS — L90.5 SCAR CONDITIONS AND FIBROSIS OF SKIN: ICD-10-CM

## 2022-03-17 PROCEDURE — ? FRAXEL

## 2022-03-17 NOTE — PROCEDURE: FRAXEL
Number Of Passes: 4
Energy(Mj/Cm2): 1
Detail Level: Detailed
Medium Metal Eye Shield Text: The ocular mucosa was anesthetized with tetracaine. Once adequate anesthesia was optained, medium metal eye shields were inserted and remained in place until the procedure was completed.
Wavelength: 1550nm
Consent: Written consent obtained, risks reviewed including but not limited to pain and incomplete improvement.
Large Metal Eye Shield Text: The ocular mucosa was anesthetized with tetracaine. Once adequate anesthesia was optained, large metal eye shields were inserted and remained in place until the procedure was completed.
Add Post-Care Below To The Note: No
Indication: surgical scars
Location: Use Location Override
Small Plastic Eye Shield Text: The ocular mucosa was anesthetized with tetracaine. Once adequate anesthesia was optained, small plastic eye shields were inserted and remained in place until the procedure was completed.
Medium Plastic Eye Shield Text: The ocular mucosa was anesthetized with tetracaine. Once adequate anesthesia was optained, medium plastic eye shields were inserted and remained in place until the procedure was completed.
Post-Care Instructions: I reviewed with the patient in detail post-care instructions. Patient should avoid sun until area fully healed.
Location Override: nose
Large Plastic Eye Shield Text: The ocular mucosa was anesthetized with tetracaine. Once adequate anesthesia was optained, large plastic eye shields were inserted and remained in place until the procedure was completed.
External Cooling Fan Speed: 5
Energy(Mj/Cm2): 70
Small Metal Eye Shield Text: The ocular mucosa was anesthetized with tetracaine. Once adequate anesthesia was optained, small metal eye shields were inserted and remained in place until the procedure was completed.

## 2022-03-18 RX ORDER — AMLODIPINE BESYLATE 5 MG/1
5 TABLET ORAL DAILY
Qty: 30 TABLET | Refills: 11 | Status: SHIPPED | OUTPATIENT
Start: 2022-03-18 | End: 2022-04-18

## 2022-03-24 RX ORDER — VALSARTAN AND HYDROCHLOROTHIAZIDE 160; 25 MG/1; MG/1
1 TABLET ORAL DAILY
Qty: 90 TABLET | Refills: 3 | Status: SHIPPED | OUTPATIENT
Start: 2022-03-24 | End: 2023-05-01 | Stop reason: SDUPTHER

## 2022-03-24 RX ORDER — BRIMONIDINE TARTRATE 1 MG/ML
SOLUTION/ DROPS OPHTHALMIC
COMMUNITY
Start: 2022-02-08

## 2022-04-15 ENCOUNTER — TELEPHONE (OUTPATIENT)
Dept: SCHEDULING | Facility: IMAGING CENTER | Age: 87
End: 2022-04-15

## 2022-04-18 RX ORDER — AMLODIPINE BESYLATE 5 MG/1
5 TABLET ORAL DAILY
Qty: 90 TABLET | Refills: 3 | Status: SHIPPED | OUTPATIENT
Start: 2022-04-18 | End: 2023-05-01 | Stop reason: SDUPTHER

## 2022-04-27 ENCOUNTER — OFFICE VISIT (OUTPATIENT)
Dept: MEDICAL GROUP | Facility: CLINIC | Age: 87
End: 2022-04-27
Payer: MEDICARE

## 2022-04-27 VITALS
HEART RATE: 82 BPM | HEIGHT: 64 IN | DIASTOLIC BLOOD PRESSURE: 79 MMHG | RESPIRATION RATE: 20 BRPM | WEIGHT: 177 LBS | BODY MASS INDEX: 30.22 KG/M2 | SYSTOLIC BLOOD PRESSURE: 130 MMHG | OXYGEN SATURATION: 93 % | TEMPERATURE: 97.8 F

## 2022-04-27 DIAGNOSIS — Z23 NEED FOR 23-POLYVALENT PNEUMOCOCCAL POLYSACCHARIDE VACCINE: ICD-10-CM

## 2022-04-27 DIAGNOSIS — Z09 ENCOUNTER FOR EXAMINATION FOLLOWING TREATMENT AT HOSPITAL: ICD-10-CM

## 2022-04-27 PROBLEM — E03.9 ACQUIRED HYPOTHYROIDISM: Status: ACTIVE | Noted: 2022-04-11

## 2022-04-27 PROCEDURE — 90732 PPSV23 VACC 2 YRS+ SUBQ/IM: CPT | Performed by: STUDENT IN AN ORGANIZED HEALTH CARE EDUCATION/TRAINING PROGRAM

## 2022-04-27 PROCEDURE — G0009 ADMIN PNEUMOCOCCAL VACCINE: HCPCS | Performed by: STUDENT IN AN ORGANIZED HEALTH CARE EDUCATION/TRAINING PROGRAM

## 2022-04-27 PROCEDURE — 99213 OFFICE O/P EST LOW 20 MIN: CPT | Mod: 25,GE | Performed by: STUDENT IN AN ORGANIZED HEALTH CARE EDUCATION/TRAINING PROGRAM

## 2022-04-27 RX ORDER — SIMVASTATIN 20 MG
20 TABLET ORAL
COMMUNITY
End: 2022-12-16

## 2022-04-27 RX ORDER — LEVOTHYROXINE SODIUM 137 UG/1
137 TABLET ORAL
COMMUNITY
End: 2022-06-20

## 2022-04-27 RX ORDER — METFORMIN HYDROCHLORIDE 500 MG/1
500 TABLET, EXTENDED RELEASE ORAL 2 TIMES DAILY
COMMUNITY
Start: 2022-03-19 | End: 2022-04-27

## 2022-04-27 RX ORDER — ASPIRIN 81 MG/1
81 TABLET, CHEWABLE ORAL DAILY
COMMUNITY
Start: 2022-04-15 | End: 2022-05-15

## 2022-04-27 NOTE — ASSESSMENT & PLAN NOTE
Continue with daily activity and strength gaining exercises.  -Continue with incentive spirometry  -Recommend booster COVID vaccination  -Recommend Prevnar vaccine

## 2022-04-27 NOTE — PROGRESS NOTES
Subjective:     CC: [facility] Follow up due to Pneumonia.    HPI:   Camelia presents today for hospital followup after being admitted for 5 days due to pneumonia. Was discharged April 15th.     Patient reports that the Friday before she had a sore throat and was feeling under the weather over the weekend was relatively unchanged but woke up Monday the 11th and was unable to get out of bed due to being too weak with fever.  Patient called EMS and was brought into [place].  While there diagnosed with pneumonia started on antibiotics was given oxygen supplementation.  Patient was in the hospital for an entire 5 nights requiring oxygen supplementation was weaned down steadily throughout the stay and at discharge was on room air.    Patient was discharged with antibiotics course which she finished appropriately.  She is also recommended to continue with ASA 81 mg daily for 30 days which she is still currently taking.  Otherwise her remainder of medications remained the same.    N patient is currently feeling much better now denies any fever, chills but does endorse feelings of weakness and shortness of breath on exertion.   Patient has 3 doses total Pfizer vaccine was scheduled to have her fourth prior to this pneumonia.  She reports 1 dose prior PCV 13 has not had any booster since.    Patient Active Problem List   Diagnosis   • Ptosis of both eyelids   • Neoplastic disease   • Neoplasm of uncertain behavior of skin   • Acquired hypothyroidism   • Encounter for examination following treatment at hospital       Current Outpatient Medications Ordered in Epic   Medication Sig Dispense Refill   • aspirin (ASA) 81 MG Chew Tab chewable tablet Chew 81 mg every day.     • levothyroxine (SYNTHROID) 137 MCG Tab Take 137 mcg by mouth.     • simvastatin (ZOCOR) 20 MG Tab Take 20 mg by mouth.     • amLODIPine (NORVASC) 5 MG Tab TAKE 1 TABLET BY MOUTH EVERY DAY 90 Tablet 3   • ALPHAGAN P 0.1 % Solution INSTILL 1 DROP  "INTO LEFT EYE TWICE A DAY     • metFORMIN (GLUCOPHAGE) 500 MG Tab Take 1 Tablet by mouth 2 times a day with meals. 180 Tablet 3   • valsartan-hydrochlorothiazide (DIOVAN-HCT) 160-25 MG per tablet Take 1 Tablet by mouth every day. 90 Tablet 3   • atenolol (TENORMIN) 50 MG Tab Take 50 mg by mouth every day.     • ascorbic acid (ASCORBIC ACID) 500 MG Tab Take 500 mg by mouth 4 times a day.     • Cholecalciferol (VITAMIN D3) 2000 UNIT Cap Take  by mouth every day.     • Multiple Vitamins-Minerals (MULTIVITAMIN ADULT) Tab Take  by mouth every day.     • B Complex-C (SUPER B COMPLEX PO) Take  by mouth every day.     • Calcium Citrate-Vitamin D (CALCIUM CITRATE + D PO) Take  by mouth every day.     • Omega-3 Fatty Acids (FISH OIL PO) Take  by mouth every day.       No current [facility]-ordered facility-administered medications on file.       Health Maintenance: PCV vaccine given today    ROS:  Gen: no fevers/chills, no changes in weight reports increased weakness  Eyes: no changes in vision  ENT: no sore throat, no hearing loss, no bloody nose  Pulm: For shortness of breath on exertion  CV: no chest pain, no palpitations  GI: no nausea/vomiting, no diarrhea  : no dysuria  MSk: no myalgias  Skin: no rash  Neuro: no headaches, no numbness/tingling  Heme/Lymph: no easy bruising      Objective:     Exam:  /79 (BP Location: Left arm, Patient Position: Sitting, BP Cuff Size: Adult)   Pulse 82   Temp 36.6 °C (97.8 °F) (Temporal)   Resp 20   Ht 1.626 m (5' 4\")   Wt 80.3 kg (177 lb)   SpO2 93%   BMI 30.38 kg/m²  Body mass index is 30.38 kg/m².    Gen: Alert and oriented, No apparent distress.  Neck: Neck is supple without lymphadenopathy.  Lungs: Normal effort, CTA bilaterally, no wheezes, rhonchi, or rales  CV: Regular rate and rhythm. No murmurs, rubs, or gallops.  Ext: No clubbing, cyanosis, 2+ edema bilateral extremities patient wearing stockings.      Labs: No labs reviewed today.    Assessment & Plan:     89 y.o. " female with the following -     Problem List Items Addressed This Visit     Encounter for examination following treatment at hospital     Continue with daily activity and strength gaining exercises.  -Continue with incentive spirometry  -Recommend booster COVID vaccination  -Recommend Prevnar vaccine           Other Visit Diagnoses     Need for 23-polyvalent pneumococcal polysaccharide vaccine        Relevant Orders    Pneumococcal Conjugate Vaccine 20-Valent (19 yrs+)      Patient has finished course of cefdinir doxycycline.    Patient presents should resume all home medications and continue until next visit.  No need for any refills at this time    Strict return precautions given of worsening shortness of breath, worsening dyspnea on exertion or cough.  Any fevers or chills patient should return to the clinic and/or emergency department for further evaluation.  Patient verbalized understanding of these return precautions and will make follow-up appointment to continue with health maintenance.    Return in about 3 months (around 7/27/2022), or if symptoms worsen or fail to improve.

## 2022-04-28 ENCOUNTER — APPOINTMENT (RX ONLY)
Dept: URBAN - METROPOLITAN AREA CLINIC 36 | Facility: CLINIC | Age: 87
Setting detail: DERMATOLOGY
End: 2022-04-28

## 2022-04-28 DIAGNOSIS — Z41.9 ENCOUNTER FOR PROCEDURE FOR PURPOSES OTHER THAN REMEDYING HEALTH STATE, UNSPECIFIED: ICD-10-CM

## 2022-04-28 PROCEDURE — ? LASER RESURFACING

## 2022-04-28 ASSESSMENT — LOCATION ZONE DERM: LOCATION ZONE: NOSE

## 2022-04-28 ASSESSMENT — LOCATION DETAILED DESCRIPTION DERM: LOCATION DETAILED: NASAL SUPRATIP

## 2022-04-28 ASSESSMENT — LOCATION SIMPLE DESCRIPTION DERM: LOCATION SIMPLE: NOSE

## 2022-04-28 NOTE — PROCEDURE: LASER RESURFACING
Treatment Number: 2
Number Of Passes: 1
Energy(Mj): 5
Percent Coverage Per Pass (%): 0
Post-Care Instructions: I reviewed with the patient in detail post-care instructions. Patient should avoid sun until area fully healed. Pt should apply vaseline to treated areas, and remove crusts gently with water-vinegar soaks.
Detail Level: Detailed
Corneal Shield Text: A corneal shield was inserted after appropriate application of topical anesthesia.
Was A Corneal Shield Used?: No
Consent: Written consent obtained, risks reviewed including but not limited to crusting, scabbing, blistering, scarring, darker or lighter pigmentary change, incomplete improvement of dyschromia, wrinkles, prolonged erythema and facial swelling, infection and bleeding.
Laser Type: CORE laser
Anesthesia Type: 1% lidocaine with epinephrine
Power (Rivas): 40
Wavelength: 10,600nm

## 2022-05-18 ENCOUNTER — APPOINTMENT (RX ONLY)
Dept: URBAN - METROPOLITAN AREA CLINIC 36 | Facility: CLINIC | Age: 87
Setting detail: DERMATOLOGY
End: 2022-05-18

## 2022-05-18 DIAGNOSIS — L90.5 SCAR CONDITIONS AND FIBROSIS OF SKIN: ICD-10-CM

## 2022-05-18 PROCEDURE — ? LASER RESURFACING

## 2022-05-18 ASSESSMENT — LOCATION DETAILED DESCRIPTION DERM: LOCATION DETAILED: NASAL SUPRATIP

## 2022-05-18 ASSESSMENT — LOCATION SIMPLE DESCRIPTION DERM: LOCATION SIMPLE: NOSE

## 2022-05-18 ASSESSMENT — LOCATION ZONE DERM: LOCATION ZONE: NOSE

## 2022-05-18 NOTE — PROCEDURE: LASER RESURFACING
Post-Care Instructions: I reviewed with the patient in detail post-care instructions. Patient should avoid sun until area fully healed. Pt should apply vaseline to treated areas, and remove crusts gently with water-vinegar soaks.
Corneal Shield Text: A corneal shield was inserted after appropriate application of topical anesthesia.
Number Of Passes: 1
Energy(Mj): 5
Treatment Number: 2
Percent Coverage Per Pass (%): 0
Laser Type: CO2Re laser (Deep)
Power (Rivas): 70
Detail Level: Detailed
Was A Corneal Shield Used?: No
Consent: Written consent obtained, risks reviewed including but not limited to crusting, scabbing, blistering, scarring, darker or lighter pigmentary change, incomplete improvement of dyschromia, wrinkles, prolonged erythema and facial swelling, infection and bleeding.
Wavelength: 10,600nm

## 2022-05-18 NOTE — PROCEDURE: MIPS QUALITY
Quality 265: Biopsy Follow-Up: Biopsy results reviewed, communicated, tracked, and documented
Quality 130: Documentation Of Current Medications In The Medical Record: Current Medications Documented
Detail Level: Detailed
Quality 431: Preventive Care And Screening: Unhealthy Alcohol Use - Screening: Patient not identified as an unhealthy alcohol user when screened for unhealthy alcohol use using a systematic screening method
Quality 111:Pneumonia Vaccination Status For Older Adults: Pneumococcal Vaccination Previously Received
Quality 226: Preventive Care And Screening: Tobacco Use: Screening And Cessation Intervention: Patient screened for tobacco use and is an ex/non-smoker

## 2022-06-20 RX ORDER — LEVOTHYROXINE SODIUM 137 UG/1
TABLET ORAL
Qty: 90 TABLET | Refills: 3 | Status: SHIPPED | OUTPATIENT
Start: 2022-06-20 | End: 2023-05-01 | Stop reason: SDUPTHER

## 2022-06-22 ENCOUNTER — APPOINTMENT (RX ONLY)
Dept: URBAN - METROPOLITAN AREA CLINIC 6 | Facility: CLINIC | Age: 87
Setting detail: DERMATOLOGY
End: 2022-06-22

## 2022-06-22 DIAGNOSIS — Z71.89 OTHER SPECIFIED COUNSELING: ICD-10-CM

## 2022-06-22 DIAGNOSIS — D485 NEOPLASM OF UNCERTAIN BEHAVIOR OF SKIN: ICD-10-CM

## 2022-06-22 DIAGNOSIS — L82.1 OTHER SEBORRHEIC KERATOSIS: ICD-10-CM

## 2022-06-22 DIAGNOSIS — L57.0 ACTINIC KERATOSIS: ICD-10-CM

## 2022-06-22 DIAGNOSIS — L82.0 INFLAMED SEBORRHEIC KERATOSIS: ICD-10-CM

## 2022-06-22 DIAGNOSIS — L81.4 OTHER MELANIN HYPERPIGMENTATION: ICD-10-CM

## 2022-06-22 DIAGNOSIS — Z85.828 PERSONAL HISTORY OF OTHER MALIGNANT NEOPLASM OF SKIN: ICD-10-CM | Status: STABLE

## 2022-06-22 DIAGNOSIS — R60.0 LOCALIZED EDEMA: ICD-10-CM

## 2022-06-22 DIAGNOSIS — D22 MELANOCYTIC NEVI: ICD-10-CM

## 2022-06-22 DIAGNOSIS — D18.0 HEMANGIOMA: ICD-10-CM

## 2022-06-22 PROBLEM — D18.01 HEMANGIOMA OF SKIN AND SUBCUTANEOUS TISSUE: Status: ACTIVE | Noted: 2022-06-22

## 2022-06-22 PROBLEM — D48.5 NEOPLASM OF UNCERTAIN BEHAVIOR OF SKIN: Status: ACTIVE | Noted: 2022-06-22

## 2022-06-22 PROBLEM — D22.5 MELANOCYTIC NEVI OF TRUNK: Status: ACTIVE | Noted: 2022-06-22

## 2022-06-22 PROCEDURE — 99213 OFFICE O/P EST LOW 20 MIN: CPT | Mod: 25

## 2022-06-22 PROCEDURE — ? BIOPSY BY SHAVE METHOD

## 2022-06-22 PROCEDURE — 17003 DESTRUCT PREMALG LES 2-14: CPT | Mod: 59

## 2022-06-22 PROCEDURE — ? LIQUID NITROGEN

## 2022-06-22 PROCEDURE — 17110 DESTRUCTION B9 LES UP TO 14: CPT

## 2022-06-22 PROCEDURE — 11102 TANGNTL BX SKIN SINGLE LES: CPT | Mod: 59

## 2022-06-22 PROCEDURE — ? SUNSCREEN TREATMENT REGIMEN

## 2022-06-22 PROCEDURE — 17000 DESTRUCT PREMALG LESION: CPT | Mod: 59

## 2022-06-22 PROCEDURE — ? COUNSELING

## 2022-06-22 ASSESSMENT — LOCATION SIMPLE DESCRIPTION DERM
LOCATION SIMPLE: ABDOMEN
LOCATION SIMPLE: RIGHT PRETIBIAL REGION
LOCATION SIMPLE: NOSE
LOCATION SIMPLE: LEFT ANKLE
LOCATION SIMPLE: LEFT EYEBROW
LOCATION SIMPLE: UPPER BACK
LOCATION SIMPLE: CHEST
LOCATION SIMPLE: RIGHT HAND
LOCATION SIMPLE: LEFT HAND
LOCATION SIMPLE: RIGHT ANTERIOR NECK
LOCATION SIMPLE: RIGHT FOREHEAD
LOCATION SIMPLE: RIGHT ANKLE
LOCATION SIMPLE: LEFT FOREHEAD

## 2022-06-22 ASSESSMENT — LOCATION DETAILED DESCRIPTION DERM
LOCATION DETAILED: NASAL SUPRATIP
LOCATION DETAILED: SUPERIOR THORACIC SPINE
LOCATION DETAILED: RIGHT CLAVICULAR NECK
LOCATION DETAILED: NASAL DORSUM
LOCATION DETAILED: UPPER STERNUM
LOCATION DETAILED: RIGHT PROXIMAL PRETIBIAL REGION
LOCATION DETAILED: LEFT ANKLE
LOCATION DETAILED: RIGHT RADIAL DORSAL HAND
LOCATION DETAILED: LEFT CENTRAL EYEBROW
LOCATION DETAILED: LEFT ULNAR DORSAL HAND
LOCATION DETAILED: LEFT RADIAL DORSAL HAND
LOCATION DETAILED: RIGHT INFERIOR FOREHEAD
LOCATION DETAILED: PERIUMBILICAL SKIN
LOCATION DETAILED: EPIGASTRIC SKIN
LOCATION DETAILED: RIGHT ANKLE
LOCATION DETAILED: LEFT INFERIOR FOREHEAD

## 2022-06-22 ASSESSMENT — LOCATION ZONE DERM
LOCATION ZONE: HAND
LOCATION ZONE: TRUNK
LOCATION ZONE: NOSE
LOCATION ZONE: FACE
LOCATION ZONE: LEG
LOCATION ZONE: NECK

## 2022-06-22 NOTE — PROCEDURE: LIQUID NITROGEN
Spray Paint Technique: No
Show Spray Paint Technique Variable?: Yes
Post-Care Instructions: I reviewed with the patient in detail post-care instructions. Patient is to wear sunprotection, and avoid picking at any of the treated lesions. Pt may apply Vaseline to crusted or scabbing areas.
Duration Of Freeze Thaw-Cycle (Seconds): 10-15
Detail Level: Detailed
Spray Paint Text: The liquid nitrogen was applied to the skin utilizing a spray paint frosting technique.
Medical Necessity Information: It is in your best interest to select a reason for this procedure from the list below. All of these items fulfill various CMS LCD requirements except the new and changing color options.
Consent: The patient's consent was obtained including but not limited to risks of crusting, scabbing, blistering, scarring, darker or lighter pigmentary change, recurrence, incomplete removal and infection.
Medical Necessity Clause: This procedure was medically necessary because the lesions that were treated were:
Number Of Freeze-Thaw Cycles: 3 freeze-thaw cycles
Number Of Freeze-Thaw Cycles: 2 freeze-thaw cycles
Duration Of Freeze Thaw-Cycle (Seconds): 10

## 2022-06-22 NOTE — PROCEDURE: MIPS QUALITY
Quality 265: Biopsy Follow-Up: Biopsy results reviewed, communicated, tracked, and documented
Quality 226: Preventive Care And Screening: Tobacco Use: Screening And Cessation Intervention: Patient screened for tobacco use and is an ex/non-smoker
Quality 111:Pneumonia Vaccination Status For Older Adults: Pneumococcal vaccine administered on or after patient’s 60th birthday and before the end of the measurement period
Quality 130: Documentation Of Current Medications In The Medical Record: Current Medications Documented
Detail Level: Detailed

## 2022-09-21 ENCOUNTER — OFFICE VISIT (OUTPATIENT)
Dept: MEDICAL GROUP | Facility: CLINIC | Age: 87
End: 2022-09-21
Payer: MEDICARE

## 2022-09-21 DIAGNOSIS — E03.9 ACQUIRED HYPOTHYROIDISM: ICD-10-CM

## 2022-09-21 DIAGNOSIS — E11.9 TYPE 2 DIABETES MELLITUS WITHOUT COMPLICATION, WITHOUT LONG-TERM CURRENT USE OF INSULIN (HCC): ICD-10-CM

## 2022-09-21 DIAGNOSIS — E78.5 HYPERLIPIDEMIA, UNSPECIFIED HYPERLIPIDEMIA TYPE: ICD-10-CM

## 2022-09-21 DIAGNOSIS — I10 PRIMARY HYPERTENSION: ICD-10-CM

## 2022-09-21 DIAGNOSIS — Z00.00 HEALTH CARE MAINTENANCE: ICD-10-CM

## 2022-09-21 PROBLEM — R26.89 BALANCE PROBLEMS: Status: ACTIVE | Noted: 2019-05-03

## 2022-09-21 PROBLEM — E66.3 OVERWEIGHT WITH BODY MASS INDEX (BMI) 25.0-29.9: Status: ACTIVE | Noted: 2019-06-25

## 2022-09-21 PROCEDURE — 99214 OFFICE O/P EST MOD 30 MIN: CPT | Performed by: FAMILY MEDICINE

## 2022-09-21 ASSESSMENT — PATIENT HEALTH QUESTIONNAIRE - PHQ9: CLINICAL INTERPRETATION OF PHQ2 SCORE: 0

## 2022-09-21 NOTE — ASSESSMENT & PLAN NOTE
She is up-to-date on COVID-vaccine and booster, pneumonia shot, shingles vaccine and flu vaccine.  And doing labs to follow-up her specific medical conditions but overall otherwise no preventive or screening measures right now.

## 2022-09-21 NOTE — PROGRESS NOTES
"Subjective:     CC: check up, DM, HTN, thyroid.    HPI:   Camelia presents today to discuss the following issues     Problem   Health Care Maintenance    Ana just turned 89.  Overall she is doing very well.  She was hospitalized for pneumonia several months ago but is not suffering any sequelae from that.  Overall she feels pretty sharp, still living at home and has no major aches or pains.  She is here for general follow-up as listed elsewhere.  She is pretty much \"aged out\" of preventive and screening measures.  She does have some questions about immunizations.     Primary Hypertension    Good control  Has enough meds     Acquired Hypothyroidism    Feels fine on generic levothyroxine.      Type 2 Diabetes Mellitus Without Complication (Hcc)    Has enough meds. Follows a good diet.     Hyperlipidemia    Labs ordered, has enough medication.     Overweight With Body Mass Index (Bmi) 25.0-29.9   Balance Problems       Current Outpatient Medications Ordered in Epic   Medication Sig Dispense Refill    levothyroxine (SYNTHROID) 137 MCG Tab TAKE 1 TABLET BY MOUTH EVERY MORNING ON AN EMPTY STOMACH FOR 90 DAYS. 90 Tablet 3    simvastatin (ZOCOR) 20 MG Tab Take 20 mg by mouth.      amLODIPine (NORVASC) 5 MG Tab TAKE 1 TABLET BY MOUTH EVERY DAY 90 Tablet 3    ALPHAGAN P 0.1 % Solution INSTILL 1 DROP INTO LEFT EYE TWICE A DAY      metFORMIN (GLUCOPHAGE) 500 MG Tab Take 1 Tablet by mouth 2 times a day with meals. 180 Tablet 3    valsartan-hydrochlorothiazide (DIOVAN-HCT) 160-25 MG per tablet Take 1 Tablet by mouth every day. 90 Tablet 3    atenolol (TENORMIN) 50 MG Tab Take 50 mg by mouth every day.      ascorbic acid (ASCORBIC ACID) 500 MG Tab Take 500 mg by mouth 4 times a day.      Cholecalciferol (VITAMIN D3) 2000 UNIT Cap Take  by mouth every day.      Multiple Vitamins-Minerals (MULTIVITAMIN ADULT) Tab Take  by mouth every day.      B Complex-C (SUPER B COMPLEX PO) Take  by mouth every day.      Calcium " "Citrate-Vitamin D (CALCIUM CITRATE + D PO) Take  by mouth every day.      Omega-3 Fatty Acids (FISH OIL PO) Take  by mouth every day.       No current [facility]-ordered facility-administered medications on file.       Health Maintenance:     ROS:  Gen: no fevers/chills, no changes in weight  Eyes: no changes in vision  ENT: no sore throat, no hearing loss, no bloody nose  Pulm: no sob, no cough  CV: no chest pain, no palpitations  GI: no nausea/vomiting, no diarrhea  : no dysuria  MSk: no myalgias  Skin: no rash  Neuro: no headaches, no numbness/tingling  Heme/Lymph: no easy bruising      Objective:     Exam:  BP (P) 138/88 (BP Location: Right arm, Patient Position: Sitting, BP Cuff Size: Adult)   Pulse (P) 84   Temp (P) 36.8 °C (98.3 °F) (Temporal)   Ht (P) 1.626 m (5' 4\")   Wt (P) 80.6 kg (177 lb 12.8 oz)   SpO2 (P) 92%   BMI (P) 30.52 kg/m²  Body mass index is 30.52 kg/m² (pended).    Gen: Alert and oriented, No apparent distress.  Neck: Neck is supple without lymphadenopathy.  Lungs: Normal effort, CTA bilaterally, no wheezes, rhonchi, or rales  CV: Regular rate and rhythm. No murmurs, rubs, or gallops.  Ext: No clubbing, cyanosis, edema.          Assessment & Plan:     89 y.o. female with the following -     Problem List Items Addressed This Visit       Acquired hypothyroidism     TSH ordered.         Type 2 diabetes mellitus without complication (HCC)    Relevant Orders    CBC WITH DIFFERENTIAL    Comp Metabolic Panel    TSH WITH REFLEX TO FT4    HEMOGLOBIN A1C    Lipid Profile    MICROALBUMIN CREAT RATIO URINE    Hyperlipidemia    Relevant Orders    CBC WITH DIFFERENTIAL    Lipid Profile    Health care maintenance     She is up-to-date on COVID-vaccine and booster, pneumonia shot, shingles vaccine and flu vaccine.  And doing labs to follow-up her specific medical conditions but overall otherwise no preventive or screening measures right now.         Primary hypertension     Labs ordered.                 No " follow-ups on file.

## 2022-11-07 ENCOUNTER — PATIENT MESSAGE (OUTPATIENT)
Dept: HEALTH INFORMATION MANAGEMENT | Facility: OTHER | Age: 87
End: 2022-11-07

## 2022-12-16 RX ORDER — SIMVASTATIN 20 MG
TABLET ORAL
Qty: 90 TABLET | Refills: 3 | Status: SHIPPED | OUTPATIENT
Start: 2022-12-16

## 2022-12-16 RX ORDER — METFORMIN HYDROCHLORIDE 500 MG/1
TABLET, EXTENDED RELEASE ORAL
Qty: 180 TABLET | Refills: 3 | Status: SHIPPED | OUTPATIENT
Start: 2022-12-16 | End: 2022-12-21

## 2022-12-16 RX ORDER — ATENOLOL 50 MG/1
TABLET ORAL
Qty: 90 TABLET | Refills: 3 | Status: SHIPPED | OUTPATIENT
Start: 2022-12-16 | End: 2023-12-11

## 2022-12-21 ENCOUNTER — OFFICE VISIT (OUTPATIENT)
Dept: MEDICAL GROUP | Facility: CLINIC | Age: 87
End: 2022-12-21
Payer: MEDICARE

## 2022-12-21 DIAGNOSIS — N95.1 MENOPAUSAL STATE: ICD-10-CM

## 2022-12-21 DIAGNOSIS — Z00.00 HEALTH CARE MAINTENANCE: ICD-10-CM

## 2022-12-21 DIAGNOSIS — Z78.0 POSTMENOPAUSAL STATUS (AGE-RELATED) (NATURAL): ICD-10-CM

## 2022-12-21 DIAGNOSIS — E11.9 TYPE 2 DIABETES MELLITUS WITHOUT COMPLICATION (HCC): ICD-10-CM

## 2022-12-21 DIAGNOSIS — E78.5 HYPERLIPIDEMIA, UNSPECIFIED HYPERLIPIDEMIA TYPE: ICD-10-CM

## 2022-12-21 PROCEDURE — 99214 OFFICE O/P EST MOD 30 MIN: CPT | Performed by: FAMILY MEDICINE

## 2022-12-21 ASSESSMENT — FIBROSIS 4 INDEX: FIB4 SCORE: 1.9

## 2022-12-21 NOTE — ASSESSMENT & PLAN NOTE
We have reviewed all of her labs from October 2022.  There is abnormalities noted.  I believe her A1c is acceptable on the current regimen and so I reordered her metformin to be taken once a day rather than twice.  Follow-up with me in 3 months for a recheck.

## 2022-12-21 NOTE — PROGRESS NOTES
"Subjective:     CC: HCM, DM, lipids    HPI:  Ana is here to discuss:    Problem   Health Care Maintenance    No new complaints. Some mild dyspnea with exertion but no cough, pain or fever.  She has had shingles vaccine, flu DPT and COVID vaccines.    Prior note:    Ana just turned 89.  Overall she is doing very well.  She was hospitalized for pneumonia several months ago but is not suffering any sequelae from that.  Overall she feels pretty sharp, still living at home and has no major aches or pains.  She is here for general follow-up as listed elsewhere.  She is pretty much \"aged out\" of preventive and screening measures.  She does have some questions about immunizations.     Type 2 Diabetes Mellitus Without Complication (Hcc)    Ana's A1c was 7.2 two months ago.  She notes that we have been prescribing 500 mg metformin's to be taken twice a day, however she is only been taking 1 for quite some time.  7.2 does reflect her on that dosing   Has seen an ophthalmologist.     Hyperlipidemia     Labs ordered, has enough medication.         Current Outpatient Medications Ordered in Epic   Medication Sig Dispense Refill    metFORMIN (GLUCOPHAGE) 500 MG Tab Take 1 Tablet by mouth every day. 90 Tablet 3    atenolol (TENORMIN) 50 MG Tab TAKE 1 TABLET BY MOUTH EVERY DAY 90 Tablet 3    simvastatin (ZOCOR) 20 MG Tab TAKE 1 TABLET BY MOUTH EVERY EVENING 90 Tablet 3    levothyroxine (SYNTHROID) 137 MCG Tab TAKE 1 TABLET BY MOUTH EVERY MORNING ON AN EMPTY STOMACH FOR 90 DAYS. 90 Tablet 3    amLODIPine (NORVASC) 5 MG Tab TAKE 1 TABLET BY MOUTH EVERY DAY 90 Tablet 3    ALPHAGAN P 0.1 % Solution INSTILL 1 DROP INTO LEFT EYE TWICE A DAY      valsartan-hydrochlorothiazide (DIOVAN-HCT) 160-25 MG per tablet Take 1 Tablet by mouth every day. 90 Tablet 3    ascorbic acid (ASCORBIC ACID) 500 MG Tab Take 500 mg by mouth 4 times a day.      Cholecalciferol (VITAMIN D3) 2000 UNIT Cap Take  by mouth every day.      Multiple " "Vitamins-Minerals (MULTIVITAMIN ADULT) Tab Take  by mouth every day.      B Complex-C (SUPER B COMPLEX PO) Take  by mouth every day.      Calcium Citrate-Vitamin D (CALCIUM CITRATE + D PO) Take  by mouth every day.      Omega-3 Fatty Acids (FISH OIL PO) Take  by mouth every day.       No current [facility]-ordered facility-administered medications on file.       Health Maintenance:     ROS:  Gen: no fevers/chills, no changes in weight  Eyes: no changes in vision  ENT: no sore throat, no hearing loss, no bloody nose  Pulm: no sob, no cough  CV: no chest pain, no palpitations  GI: no nausea/vomiting, no diarrhea  : no dysuria  MSk: no myalgias  Skin: no rash  Neuro: no headaches, no numbness/tingling  Heme/Lymph: no easy bruising      Objective:     Exam:  BP (P) 130/80 (BP Location: Left arm, Patient Position: Sitting, BP Cuff Size: Adult)   Pulse (P) 88   Temp (P) 36.4 °C (97.5 °F) (Temporal)   Ht (P) 1.626 m (5' 4\")   Wt (P) 80.6 kg (177 lb 12.8 oz)   SpO2 (P) 94%   BMI (P) 30.52 kg/m²  Body mass index is 30.52 kg/m² (pended).    Gen: Alert and oriented, No apparent distress.  Neck: Neck is supple without lymphadenopathy.  Lungs: Normal effort, CTA bilaterally, no wheezes, rhonchi, or rales  CV: Regular rate and rhythm. No murmurs, rubs, or gallops.  Ext: No clubbing, cyanosis, edema.          Assessment & Plan:     89 y.o. female with the following -     Problem List Items Addressed This Visit       Type 2 diabetes mellitus without complication (HCC)     We have reviewed all of her labs from October 2022.  There is abnormalities noted.  I believe her A1c is acceptable on the current regimen and so I reordered her metformin to be taken once a day rather than twice.  Follow-up with me in 3 months for a recheck.         Relevant Medications    metFORMIN (GLUCOPHAGE) 500 MG Tab    Hyperlipidemia     I will review her lab results with her when they are complete.         Health care maintenance     Heart is regular " rate rhythm lungs are clear there is no distress.  Hold on further evaluation of dyspnea for now.  She feels that she is deconditioned and is going to gradually get into some aerobic exercise.  I will follow.  No further event of her screening measures recommended now other than bone densitometry which I did order.          Other Visit Diagnoses       Postmenopausal status (age-related) (natural)        Relevant Orders    DS-BONE DENSITY STUDY (DEXA)    Menopausal state        Relevant Orders    DS-BONE DENSITY STUDY (DEXA)            I spent a total of 35 minutes with record review, exam, communication with the patient, communication with other providers, and documentation of this encounter.      No follow-ups on file.

## 2022-12-21 NOTE — ASSESSMENT & PLAN NOTE
Heart is regular rate rhythm lungs are clear there is no distress.  Hold on further evaluation of dyspnea for now.  She feels that she is deconditioned and is going to gradually get into some aerobic exercise.  I will follow.  No further event of her screening measures recommended now other than bone densitometry which I did order.

## 2022-12-22 ENCOUNTER — APPOINTMENT (RX ONLY)
Dept: URBAN - METROPOLITAN AREA CLINIC 6 | Facility: CLINIC | Age: 87
Setting detail: DERMATOLOGY
End: 2022-12-22

## 2022-12-22 DIAGNOSIS — L81.4 OTHER MELANIN HYPERPIGMENTATION: ICD-10-CM

## 2022-12-22 DIAGNOSIS — Z85.828 PERSONAL HISTORY OF OTHER MALIGNANT NEOPLASM OF SKIN: ICD-10-CM

## 2022-12-22 DIAGNOSIS — Z71.89 OTHER SPECIFIED COUNSELING: ICD-10-CM

## 2022-12-22 DIAGNOSIS — D22 MELANOCYTIC NEVI: ICD-10-CM

## 2022-12-22 DIAGNOSIS — L57.0 ACTINIC KERATOSIS: ICD-10-CM

## 2022-12-22 DIAGNOSIS — L82.1 OTHER SEBORRHEIC KERATOSIS: ICD-10-CM

## 2022-12-22 DIAGNOSIS — D18.0 HEMANGIOMA: ICD-10-CM

## 2022-12-22 PROBLEM — D18.01 HEMANGIOMA OF SKIN AND SUBCUTANEOUS TISSUE: Status: ACTIVE | Noted: 2022-12-22

## 2022-12-22 PROBLEM — D22.5 MELANOCYTIC NEVI OF TRUNK: Status: ACTIVE | Noted: 2022-12-22

## 2022-12-22 PROCEDURE — ? SUNSCREEN TREATMENT REGIMEN

## 2022-12-22 PROCEDURE — ? COUNSELING

## 2022-12-22 PROCEDURE — 99213 OFFICE O/P EST LOW 20 MIN: CPT | Mod: 25

## 2022-12-22 PROCEDURE — ? LIQUID NITROGEN

## 2022-12-22 PROCEDURE — ? SUNSCREEN RECOMMENDATIONS

## 2022-12-22 PROCEDURE — 17003 DESTRUCT PREMALG LES 2-14: CPT

## 2022-12-22 PROCEDURE — 17000 DESTRUCT PREMALG LESION: CPT

## 2022-12-22 ASSESSMENT — LOCATION ZONE DERM
LOCATION ZONE: NOSE
LOCATION ZONE: FACE
LOCATION ZONE: HAND
LOCATION ZONE: ARM
LOCATION ZONE: TRUNK

## 2022-12-22 ASSESSMENT — LOCATION SIMPLE DESCRIPTION DERM
LOCATION SIMPLE: LEFT HAND
LOCATION SIMPLE: RIGHT HAND
LOCATION SIMPLE: SUPERIOR FOREHEAD
LOCATION SIMPLE: CHEST
LOCATION SIMPLE: RIGHT EYEBROW
LOCATION SIMPLE: ABDOMEN
LOCATION SIMPLE: LEFT FOREARM
LOCATION SIMPLE: NOSE

## 2022-12-22 ASSESSMENT — LOCATION DETAILED DESCRIPTION DERM
LOCATION DETAILED: SUPERIOR MID FOREHEAD
LOCATION DETAILED: LEFT DORSAL MIDDLE METACARPOPHALANGEAL JOINT
LOCATION DETAILED: NASAL SUPRATIP
LOCATION DETAILED: LEFT PROXIMAL DORSAL FOREARM
LOCATION DETAILED: RIGHT RADIAL DORSAL HAND
LOCATION DETAILED: MIDDLE STERNUM
LOCATION DETAILED: RIGHT CENTRAL EYEBROW
LOCATION DETAILED: EPIGASTRIC SKIN
LOCATION DETAILED: SUBXIPHOID

## 2022-12-22 NOTE — PROCEDURE: LIQUID NITROGEN
Detail Level: Detailed
Render Note In Bullet Format When Appropriate: No
Show Applicator Variable?: Yes
Post-Care Instructions: I reviewed with the patient in detail post-care instructions. Patient is to wear sunprotection, and avoid picking at any of the treated lesions. Pt may apply Vaseline to crusted or scabbing areas.
Duration Of Freeze Thaw-Cycle (Seconds): 10
Number Of Freeze-Thaw Cycles: 2 freeze-thaw cycles
Consent: The patient's consent was obtained including but not limited to risks of crusting, scabbing, blistering, scarring, darker or lighter pigmentary change, recurrence, incomplete removal and infection.

## 2023-02-09 ENCOUNTER — HOSPITAL ENCOUNTER (OUTPATIENT)
Dept: RADIOLOGY | Facility: MEDICAL CENTER | Age: 88
End: 2023-02-09
Attending: FAMILY MEDICINE
Payer: MEDICARE

## 2023-02-09 DIAGNOSIS — N95.1 MENOPAUSAL STATE: ICD-10-CM

## 2023-02-09 DIAGNOSIS — Z78.0 POSTMENOPAUSAL STATUS (AGE-RELATED) (NATURAL): ICD-10-CM

## 2023-02-09 PROCEDURE — 77080 DXA BONE DENSITY AXIAL: CPT

## 2023-02-21 SDOH — ECONOMIC STABILITY: FOOD INSECURITY: WITHIN THE PAST 12 MONTHS, YOU WORRIED THAT YOUR FOOD WOULD RUN OUT BEFORE YOU GOT MONEY TO BUY MORE.: NEVER TRUE

## 2023-02-21 SDOH — ECONOMIC STABILITY: HOUSING INSECURITY
IN THE LAST 12 MONTHS, WAS THERE A TIME WHEN YOU DID NOT HAVE A STEADY PLACE TO SLEEP OR SLEPT IN A SHELTER (INCLUDING NOW)?: NO

## 2023-02-21 SDOH — HEALTH STABILITY: PHYSICAL HEALTH: ON AVERAGE, HOW MANY DAYS PER WEEK DO YOU ENGAGE IN MODERATE TO STRENUOUS EXERCISE (LIKE A BRISK WALK)?: 0 DAYS

## 2023-02-21 SDOH — ECONOMIC STABILITY: INCOME INSECURITY: IN THE LAST 12 MONTHS, WAS THERE A TIME WHEN YOU WERE NOT ABLE TO PAY THE MORTGAGE OR RENT ON TIME?: NO

## 2023-02-21 SDOH — ECONOMIC STABILITY: INCOME INSECURITY: HOW HARD IS IT FOR YOU TO PAY FOR THE VERY BASICS LIKE FOOD, HOUSING, MEDICAL CARE, AND HEATING?: NOT HARD AT ALL

## 2023-02-21 SDOH — ECONOMIC STABILITY: FOOD INSECURITY: WITHIN THE PAST 12 MONTHS, THE FOOD YOU BOUGHT JUST DIDN'T LAST AND YOU DIDN'T HAVE MONEY TO GET MORE.: NEVER TRUE

## 2023-02-21 SDOH — ECONOMIC STABILITY: TRANSPORTATION INSECURITY
IN THE PAST 12 MONTHS, HAS THE LACK OF TRANSPORTATION KEPT YOU FROM MEDICAL APPOINTMENTS OR FROM GETTING MEDICATIONS?: NO

## 2023-02-21 SDOH — ECONOMIC STABILITY: HOUSING INSECURITY: IN THE LAST 12 MONTHS, HOW MANY PLACES HAVE YOU LIVED?: 1

## 2023-02-21 SDOH — HEALTH STABILITY: PHYSICAL HEALTH: ON AVERAGE, HOW MANY MINUTES DO YOU ENGAGE IN EXERCISE AT THIS LEVEL?: 0 MIN

## 2023-02-21 SDOH — ECONOMIC STABILITY: TRANSPORTATION INSECURITY
IN THE PAST 12 MONTHS, HAS LACK OF TRANSPORTATION KEPT YOU FROM MEETINGS, WORK, OR FROM GETTING THINGS NEEDED FOR DAILY LIVING?: NO

## 2023-02-21 ASSESSMENT — LIFESTYLE VARIABLES
HOW MANY STANDARD DRINKS CONTAINING ALCOHOL DO YOU HAVE ON A TYPICAL DAY: 1 OR 2
HOW OFTEN DO YOU HAVE A DRINK CONTAINING ALCOHOL: 4 OR MORE TIMES A WEEK
HOW OFTEN DO YOU HAVE SIX OR MORE DRINKS ON ONE OCCASION: NEVER
SKIP TO QUESTIONS 9-10: 1
AUDIT-C TOTAL SCORE: 4

## 2023-02-21 ASSESSMENT — SOCIAL DETERMINANTS OF HEALTH (SDOH)
HOW OFTEN DO YOU ATTENT MEETINGS OF THE CLUB OR ORGANIZATION YOU BELONG TO?: MORE THAN 4 TIMES PER YEAR
DO YOU BELONG TO ANY CLUBS OR ORGANIZATIONS SUCH AS CHURCH GROUPS UNIONS, FRATERNAL OR ATHLETIC GROUPS, OR SCHOOL GROUPS?: YES
HOW OFTEN DO YOU GET TOGETHER WITH FRIENDS OR RELATIVES?: TWICE A WEEK
IN A TYPICAL WEEK, HOW MANY TIMES DO YOU TALK ON THE PHONE WITH FAMILY, FRIENDS, OR NEIGHBORS?: MORE THAN THREE TIMES A WEEK
HOW OFTEN DO YOU ATTEND CHURCH OR RELIGIOUS SERVICES?: NEVER

## 2023-02-23 SDOH — ECONOMIC STABILITY: TRANSPORTATION INSECURITY
IN THE PAST 12 MONTHS, HAS LACK OF RELIABLE TRANSPORTATION KEPT YOU FROM MEDICAL APPOINTMENTS, MEETINGS, WORK OR FROM GETTING THINGS NEEDED FOR DAILY LIVING?: NO

## 2023-02-23 SDOH — ECONOMIC STABILITY: HOUSING INSECURITY: IN THE LAST 12 MONTHS, HOW MANY PLACES HAVE YOU LIVED?: 1

## 2023-02-23 SDOH — HEALTH STABILITY: PHYSICAL HEALTH: ON AVERAGE, HOW MANY DAYS PER WEEK DO YOU ENGAGE IN MODERATE TO STRENUOUS EXERCISE (LIKE A BRISK WALK)?: 0 DAYS

## 2023-02-23 SDOH — HEALTH STABILITY: MENTAL HEALTH
STRESS IS WHEN SOMEONE FEELS TENSE, NERVOUS, ANXIOUS, OR CAN'T SLEEP AT NIGHT BECAUSE THEIR MIND IS TROUBLED. HOW STRESSED ARE YOU?: NOT AT ALL

## 2023-02-23 SDOH — HEALTH STABILITY: PHYSICAL HEALTH: ON AVERAGE, HOW MANY MINUTES DO YOU ENGAGE IN EXERCISE AT THIS LEVEL?: 0 MIN

## 2023-02-23 ASSESSMENT — SOCIAL DETERMINANTS OF HEALTH (SDOH)
HOW OFTEN DO YOU ATTENT MEETINGS OF THE CLUB OR ORGANIZATION YOU BELONG TO?: MORE THAN 4 TIMES PER YEAR
IN A TYPICAL WEEK, HOW MANY TIMES DO YOU TALK ON THE PHONE WITH FAMILY, FRIENDS, OR NEIGHBORS?: MORE THAN THREE TIMES A WEEK
HOW MANY DRINKS CONTAINING ALCOHOL DO YOU HAVE ON A TYPICAL DAY WHEN YOU ARE DRINKING: 1 OR 2
DO YOU BELONG TO ANY CLUBS OR ORGANIZATIONS SUCH AS CHURCH GROUPS UNIONS, FRATERNAL OR ATHLETIC GROUPS, OR SCHOOL GROUPS?: YES
HOW OFTEN DO YOU GET TOGETHER WITH FRIENDS OR RELATIVES?: TWICE A WEEK
HOW HARD IS IT FOR YOU TO PAY FOR THE VERY BASICS LIKE FOOD, HOUSING, MEDICAL CARE, AND HEATING?: NOT HARD AT ALL
HOW OFTEN DO YOU ATTEND CHURCH OR RELIGIOUS SERVICES?: NEVER
HOW OFTEN DO YOU HAVE SIX OR MORE DRINKS ON ONE OCCASION: NEVER
WITHIN THE PAST 12 MONTHS, YOU WORRIED THAT YOUR FOOD WOULD RUN OUT BEFORE YOU GOT THE MONEY TO BUY MORE: NEVER TRUE
HOW OFTEN DO YOU HAVE A DRINK CONTAINING ALCOHOL: 4 OR MORE TIMES A WEEK

## 2023-03-10 ENCOUNTER — OFFICE VISIT (OUTPATIENT)
Dept: MEDICAL GROUP | Facility: CLINIC | Age: 88
End: 2023-03-10
Payer: MEDICARE

## 2023-03-10 VITALS
SYSTOLIC BLOOD PRESSURE: 130 MMHG | HEART RATE: 84 BPM | DIASTOLIC BLOOD PRESSURE: 68 MMHG | TEMPERATURE: 98.2 F | HEIGHT: 64 IN | WEIGHT: 174.5 LBS | RESPIRATION RATE: 16 BRPM | BODY MASS INDEX: 29.79 KG/M2 | OXYGEN SATURATION: 91 %

## 2023-03-10 DIAGNOSIS — E11.9 TYPE 2 DIABETES MELLITUS WITHOUT COMPLICATION, WITHOUT LONG-TERM CURRENT USE OF INSULIN (HCC): ICD-10-CM

## 2023-03-10 DIAGNOSIS — E03.9 ACQUIRED HYPOTHYROIDISM: ICD-10-CM

## 2023-03-10 PROCEDURE — G0439 PPPS, SUBSEQ VISIT: HCPCS | Mod: GE

## 2023-03-10 ASSESSMENT — PATIENT HEALTH QUESTIONNAIRE - PHQ9: CLINICAL INTERPRETATION OF PHQ2 SCORE: 0

## 2023-03-10 ASSESSMENT — FIBROSIS 4 INDEX: FIB4 SCORE: 1.75

## 2023-03-10 ASSESSMENT — ENCOUNTER SYMPTOMS: GENERAL WELL-BEING: GOOD

## 2023-03-10 ASSESSMENT — ACTIVITIES OF DAILY LIVING (ADL): BATHING_REQUIRES_ASSISTANCE: 0

## 2023-03-10 NOTE — PROGRESS NOTES
Chief Complaint   Patient presents with    Annual Exam     Here for Medicare Wellness Exam       HPI:  Camelia is a 89 y.o. here for Medicare Annual Wellness Visit. She has no further complaints.     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 90 Tablet 3    atenolol (TENORMIN) 50 MG Tab TAKE 1 TABLET BY MOUTH EVERY DAY 90 Tablet 3    simvastatin (ZOCOR) 20 MG Tab TAKE 1 TABLET BY MOUTH EVERY EVENING 90 Tablet 3    levothyroxine (SYNTHROID) 137 MCG Tab TAKE 1 TABLET BY MOUTH EVERY MORNING ON AN EMPTY STOMACH FOR 90 DAYS. 90 Tablet 3    amLODIPine (NORVASC) 5 MG Tab TAKE 1 TABLET BY MOUTH EVERY DAY 90 Tablet 3    ALPHAGAN P 0.1 % Solution INSTILL 1 DROP INTO LEFT EYE TWICE A DAY      valsartan-hydrochlorothiazide (DIOVAN-HCT) 160-25 MG per tablet Take 1 Tablet by mouth every day. 90 Tablet 3    ascorbic acid (ASCORBIC ACID) 500 MG Tab Take 500 mg by mouth 4 times a day.      Cholecalciferol (VITAMIN D3) 2000 UNIT Cap Take  by mouth every day.      Multiple Vitamins-Minerals (MULTIVITAMIN ADULT) Tab Take  by mouth every day.      B Complex-C (SUPER B COMPLEX PO) Take  by mouth every day.      Calcium Citrate-Vitamin D (CALCIUM CITRATE + D PO) Take  by mouth every day.      Omega-3 Fatty Acids (FISH OIL PO) Take  by mouth every day.       No current facility-administered medications for  this visit.      Patient is taking medications as noted in medication list.  Current supplements as per medication list.     Allergies: Codeine and Nickel    Current social contact/activities: a lot of hobbies, involved in multiple projects with senior care continuing education, goes to the [facility] monthly with friends     Is patient current with immunizations? She is due for Shingrix.  Will need to get at the pharmacy    She  reports that she has never smoked. She has never used smokeless tobacco. She reports current alcohol use. She reports that she does not use drugs.  Counseling given: Not Answered    ROS:    Gait: Uses a cane   Ostomy: No   Other tubes: No   Amputations: No   Chronic oxygen use No   Last eye exam was 4 months ago   Wears hearing aids: No   : Reports urinary leakage during the last 6 months that has not interfered at all with their daily activities or sleep.    Screening:    Depression Screening  Little interest or pleasure in doing things?  0 - not at all  Feeling down, depressed, or hopeless? 0 - not at all  Patient Health Questionnaire Score: 0    If depressive symptoms identified deferred to follow up visit unless specifically addressed in assessment and plan.    Interpretation of PHQ-9 Total Score   Score Severity   1-4 No Depression   5-9 Mild Depression   10-14 Moderate Depression   15-19 Moderately Severe Depression   20-27 Severe Depression    Screening for Cognitive Impairment  Three Minute Recall (daughter, heaven, mountain)  2/3    Raymond clock face with all 12 numbers and set the hands to show 10 past 11.  Yes    If cognitive concerns identified, deferred for follow up unless specifically addressed in assessment and plan.    Fall Risk Assessment  Has the patient had two or more falls in the last year or any fall with injury in the last year?  No  If fall risk identified, deferred for follow up unless specifically addressed in assessment and plan.    Safety Assessment  Throw  rugs on floor.  Yes  Handrails on all stairs.  Yes  Good lighting in all hallways.  Yes  Difficulty hearing.  No  Patient counseled about all safety risks that were identified.    Functional Assessment ADLs  Are there any barriers preventing you from cooking for yourself or meeting nutritional needs?  No.    Are there any barriers preventing you from driving safely or obtaining transportation?  No.    Are there any barriers preventing you from using a telephone or calling for help?  No.    Are there any barriers preventing you from shopping?  No.    Are there any barriers preventing you from taking care of your own finances?  No.    Are there any barriers preventing you from managing your medications?  No.    Are there any barriers preventing you from showering, bathing or dressing yourself?  No.    Are you currently engaging in any exercise or physical activity?  No.     What is your perception of your health?  Good.    Advance Care Planning  Do you have an Advance Directive, Living Will, Durable Power of , or POLST? Yes  Advance Directive Living Will Durable Power of    is not on file - instructed patient to bring in a copy to scan into their chart    Health Maintenance Summary            Overdue - Annual Wellness Visit (Every 366 Days) Overdue - never done      No completion history exists for this topic.              Overdue - DIABETES MONOFILAMENT / LE EXAM (Yearly) Overdue - never done      No completion history exists for this topic.              Overdue - IMM ZOSTER VACCINES (2 of 3) Overdue since 6/21/2013 04/26/2013  Outside Immunization: Zoster, Live (Zostavax)              Overdue - IMM INFLUENZA (1) Overdue since 9/1/2022 09/14/2016  Outside Immunization: Influenza Quad Inj P    10/02/2015  Outside Immunization: Influenza, High Dose    10/24/2014  Outside Immunization: Influenza w/preserv.              IMM PNEUMOCOCCAL VACCINE: 65+ Years (2 - PCV) Next due on 4/27/2023       04/27/2022  Imm Admin: Pneumococcal polysaccharide vaccine (PPSV-23)              RETINAL SCREENING (Every 6 Months) Next due on 6/1/2023 12/01/2022  AMB EXTERNAL RETINAL SCREENING RESULTS    11/05/2021  REFERRAL FOR RETINAL SCREENING EXAM              A1C SCREENING (Every 6 Months) Tentatively due on 7/6/2023 01/06/2023  HEMOGLOBIN A1C    10/13/2022  HEMOGLOBIN A1C    04/12/2022  HEMOGLOBIN A1C    03/05/2020  Outside Procedure: HEMOGLOBIN A1C    05/14/2019  Outside Procedure: HEMOGLOBIN A1C    Only the first 5 history entries have been loaded, but more history exists.              FASTING LIPID PROFILE (Yearly) Next due on 10/13/2023      10/13/2022  LIPID PANEL    04/12/2022  Outside Procedure: LIPID PROFILE              URINE ACR / MICROALBUMIN (Yearly) Next due on 10/13/2023      10/13/2022  MICROALB/CREAT RATIO RAND. UR              SERUM CREATININE (Yearly) Next due on 1/6/2024 01/06/2023  COMP METABOLIC PANEL    01/05/2023  COMP METABOLIC PANEL    10/13/2022  COMP METABOLIC PANEL    04/15/2022  BASIC METABOLIC PANEL    04/13/2022  COMP METABOLIC PANEL    Only the first 5 history entries have been loaded, but more history exists.              BONE DENSITY (Every 5 Years) Next due on 2/9/2028 02/09/2023  DS-BONE DENSITY STUDY (DEXA)              IMM DTaP/Tdap/Td Vaccine (3 - Td or Tdap) Next due on 8/17/2032 08/17/2022  Outside Immunization: Tdap    10/24/2014  Outside Immunization: Tdap              COVID-19 Vaccine (Series Information) Completed      10/06/2022  Imm Admin: PFIZER BIVALENT BOOSTER SARS-COV-2 VACCINE (12+)    05/13/2022  Imm Admin: PFIZER LUCIA CAP SARS-COV-2 VACCINATION (12+)    10/10/2021  Patient Reported Immunization: Pfizer    03/09/2021  Imm Admin: PFIZER PURPLE CAP SARS-COV-2 VACCINATION (12+)    02/16/2021  Imm Admin: PFIZER PURPLE CAP SARS-COV-2 VACCINATION (12+)              IMM HEP B VACCINE (Series Information) Aged Out      No completion history exists for  "this topic.              IMM MENINGOCOCCAL ACWY VACCINE (Series Information) Aged Out      No completion history exists for this topic.                    Patient Care Team:  Kevin Bryson M.D. as PCP - General (Family Medicine)    Social History     Tobacco Use    Smoking status: Never    Smokeless tobacco: Never   Substance Use Topics    Alcohol use: Yes     Comment: 4-5 per week    Drug use: No     No family history on file.  She  has a past medical history of Anemia, Blood clotting disorder (HCC) (03/09/2018), Breath shortness (03/09/2018), Cataract, Cold (03/09/2018), Dental disorder, Diabetes, High cholesterol, Hypercholesteremia, Hypertension, Hypothyroid, and Renal disorder (1990's).   Past Surgical History:   Procedure Laterality Date    PENETRATING KERATOPLASTY Left 3/13/2018    Procedure: DESCEMETS STRIPPING ENDOTHELIAL KERATOPLASTY;  Surgeon: Guille Rasmussen M.D.;  Location: SURGERY SAME DAY [facility] ORS;  Service: Ophthalmology    PTOSIS REPAIR Bilateral 10/5/2016    Procedure: PTOSIS REPAIR upper lid (levator advanced set up);  Surgeon: Silvino Seay M.D.;  Location: [facility];  Service:     EYE SURGERY Left 2015    Retinal repair \"blood clot removed\"    THYROIDECTOMY  1980's    LITHOTRIPSY  1980's    kidney stone    TONSILLECTOMY  1944    CATARACT EXTRACTION WITH IOL Bilateral 2015/2016       Exam:   /68 (BP Location: Left arm, Patient Position: Sitting, BP Cuff Size: Adult)   Pulse 84   Temp 36.8 °C (98.2 °F) (Temporal)   Resp 16   Ht 1.613 m (5' 3.5\")   Wt 79.2 kg (174 lb 8 oz)   SpO2 91%  Body mass index is 30.43 kg/m².    Gen:    Alert and oriented, No apparent distress.  HEENT: Good dentition, slightly hard of hearing, Neck is supple without lymphadenopathy.  Lungs: Normal effort, CTA bilaterally, no wheezes, rhonchi, or rales  CV:      Regular rate and rhythm. No murmurs, rubs, or gallops.  Ext:      No clubbing, cyanosis, edema.  Neuro: Eye contact is good, " speech goal directed, affect calm    Assessment and Plan. The following treatment and monitoring plan is recommended: Discussed with patient I would like her to follow up regarding her hypothyroidism and diabetes. Last TSH performed in October of 2022 was elevated and her last HbA1c 2 months ago was 6.6. Ordered TSH labs for further evaluation and will have her follow up within the month. We also discussed increasing physical activity and diet. Ensuring she at least eats three balanced meals a day. 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 We discussed trying to go for walks daily.     Services suggested: No services needed at this time  Health Care Screening recommendations as per orders if indicated.  Referrals offered: PT/OT/Nutrition counseling/Behavioral Health/Smoking cessation as per orders if indicated.    Discussion today about general wellness and lifestyle habits:    Prevent falls and reduce trip hazards; Cautioned about securing or removing rugs.  Have a working fire alarm and carbon monoxide detector;   Engage in regular physical activity and social activities     Follow-up: Return in about 1 month (around 4/10/2023).

## 2023-05-01 ENCOUNTER — OFFICE VISIT (OUTPATIENT)
Dept: MEDICAL GROUP | Facility: CLINIC | Age: 88
End: 2023-05-01
Payer: MEDICARE

## 2023-05-01 VITALS
DIASTOLIC BLOOD PRESSURE: 77 MMHG | WEIGHT: 177.13 LBS | SYSTOLIC BLOOD PRESSURE: 163 MMHG | TEMPERATURE: 98.3 F | RESPIRATION RATE: 16 BRPM | HEART RATE: 81 BPM | HEIGHT: 64 IN | OXYGEN SATURATION: 89 % | BODY MASS INDEX: 30.24 KG/M2

## 2023-05-01 DIAGNOSIS — G44.229 CHRONIC TENSION-TYPE HEADACHE, NOT INTRACTABLE: ICD-10-CM

## 2023-05-01 DIAGNOSIS — E11.9 TYPE 2 DIABETES MELLITUS WITHOUT COMPLICATION, WITHOUT LONG-TERM CURRENT USE OF INSULIN (HCC): ICD-10-CM

## 2023-05-01 DIAGNOSIS — I10 PRIMARY HYPERTENSION: ICD-10-CM

## 2023-05-01 DIAGNOSIS — E03.9 ACQUIRED HYPOTHYROIDISM: ICD-10-CM

## 2023-05-01 PROBLEM — G44.209 TENSION TYPE HEADACHE: Status: ACTIVE | Noted: 2023-05-01

## 2023-05-01 PROCEDURE — 99214 OFFICE O/P EST MOD 30 MIN: CPT | Performed by: FAMILY MEDICINE

## 2023-05-01 RX ORDER — LEVOTHYROXINE SODIUM 137 UG/1
TABLET ORAL
Qty: 90 TABLET | Refills: 3 | Status: SHIPPED | OUTPATIENT
Start: 2023-05-01

## 2023-05-01 RX ORDER — AMLODIPINE BESYLATE 5 MG/1
5 TABLET ORAL DAILY
Qty: 90 TABLET | Refills: 3 | Status: SHIPPED | OUTPATIENT
Start: 2023-05-01

## 2023-05-01 RX ORDER — VALSARTAN AND HYDROCHLOROTHIAZIDE 160; 25 MG/1; MG/1
1 TABLET ORAL DAILY
Qty: 90 TABLET | Refills: 3 | Status: SHIPPED | OUTPATIENT
Start: 2023-05-01 | End: 2024-02-02

## 2023-05-01 ASSESSMENT — FIBROSIS 4 INDEX: FIB4 SCORE: 1.75

## 2023-05-01 NOTE — ASSESSMENT & PLAN NOTE
Exam is normal, I see no sign of serious pathology.  We discussed further evaluation and treatment but I think we will continue to observe for now.  She can use the occasional Tylenol or Advil as needed.  I did discuss potential for imaging and/or physical therapy if this gets worse.

## 2023-05-01 NOTE — PROGRESS NOTES
Subjective:     CC: Headache, HTN, DM    HPI:   Camelia presents today to discuss the following issues     Problem   Tension Type Headache    Ana has had a few month history of pain in the back of her head.  She believes it may be related to position and working on the computer.  The pain does not radiate, it is not associated with changes in vision or speech.  She is not vomiting or having any fever.  There has been no trauma.  She does not take medicines for it.  Symptoms are resolved with sleep generally.  Symptoms progressed during the day but generally related to the amount of time spent at the computer.     Primary Hypertension    Had trouble getting amlodipine refilled Good control  Has enough meds     Acquired Hypothyroidism    Feels fine on generic levothyroxine. Last TSH normal.      Type 2 Diabetes Mellitus Without Complication (Hcc)    Her last A1c was 6.6 during a hospital visit 2 months ago.  Full chemistries were done at that time and I will not redraw any now.  She has enough metformin to last until December.    Prior visit      Ana's A1c was 7.2 two months ago.  She notes that we have been prescribing 500 mg metformin's to be taken twice a day, however she is only been taking 1 for quite some time.  7.2 does reflect her on that dosing   Has seen an ophthalmologist.         Current Outpatient Medications Ordered in Epic   Medication Sig Dispense Refill    amLODIPine (NORVASC) 5 MG Tab Take 1 Tablet by mouth every day. 90 Tablet 3    levothyroxine (SYNTHROID) 137 MCG Tab TAKE 1 TABLET BY MOUTH EVERY MORNING ON AN EMPTY STOMACH FOR 90 DAYS. 90 Tablet 3    valsartan-hydrochlorothiazide (DIOVAN-HCT) 160-25 MG per tablet Take 1 Tablet by mouth every day. 90 Tablet 3    metFORMIN (GLUCOPHAGE) 500 MG Tab Take 1 Tablet by mouth every day. 90 Tablet 3    atenolol (TENORMIN) 50 MG Tab TAKE 1 TABLET BY MOUTH EVERY DAY 90 Tablet 3    simvastatin (ZOCOR) 20 MG Tab TAKE 1 TABLET BY MOUTH EVERY EVENING 90  "Tablet 3    ALPHAGAN P 0.1 % Solution INSTILL 1 DROP INTO LEFT EYE TWICE A DAY      ascorbic acid (ASCORBIC ACID) 500 MG Tab Take 500 mg by mouth 4 times a day.      Cholecalciferol (VITAMIN D3) 2000 UNIT Cap Take  by mouth every day.      Multiple Vitamins-Minerals (MULTIVITAMIN ADULT) Tab Take  by mouth every day.      B Complex-C (SUPER B COMPLEX PO) Take  by mouth every day.      Calcium Citrate-Vitamin D (CALCIUM CITRATE + D PO) Take  by mouth every day.      Omega-3 Fatty Acids (FISH OIL PO) Take  by mouth every day.       No current [facility]-ordered facility-administered medications on file.       Health Maintenance:     ROS:  Gen: no fevers/chills, no changes in weight  Eyes: no changes in vision  ENT: no sore throat, no hearing loss, no bloody nose  Pulm: no sob, no cough  CV: no chest pain, no palpitations  GI: no nausea/vomiting, no diarrhea  : no dysuria  MSk: no myalgias  Skin: no rash  Neuro: no headaches, no numbness/tingling  Heme/Lymph: no easy bruising      Objective:     Exam:  BP (!) 163/77 (BP Location: Left arm, Patient Position: Sitting, BP Cuff Size: Adult)   Pulse 81   Temp 36.8 °C (98.3 °F) (Temporal)   Resp 16   Ht 1.613 m (5' 3.5\")   Wt 80.3 kg (177 lb 2 oz)   SpO2 89%   BMI 30.88 kg/m²  Body mass index is 30.88 kg/m².    Gen: Alert and oriented, No apparent distress.  Neck: Neck is supple without lymphadenopathy.  Lungs: Normal effort, CTA bilaterally, no wheezes, rhonchi, or rales  CV: Regular rate and rhythm. No murmurs, rubs, or gallops.  Ext: No clubbing, cyanosis, edema.          Assessment & Plan:     89 y.o. female with the following -     Problem List Items Addressed This Visit       Acquired hypothyroidism     RF meds: one year         Relevant Medications    levothyroxine (SYNTHROID) 137 MCG Tab    Type 2 diabetes mellitus without complication (HCC)    Primary hypertension     I refilled amlodipine: one year supply.          Relevant Medications    amLODIPine (NORVASC) 5 " MG Tab    valsartan-hydrochlorothiazide (DIOVAN-HCT) 160-25 MG per tablet    Tension type headache     Exam is normal, I see no sign of serious pathology.  We discussed further evaluation and treatment but I think we will continue to observe for now.  She can use the occasional Tylenol or Advil as needed.  I did discuss potential for imaging and/or physical therapy if this gets worse.         Relevant Medications    amLODIPine (NORVASC) 5 MG Tab         No follow-ups on file.

## 2023-06-22 ENCOUNTER — APPOINTMENT (RX ONLY)
Dept: URBAN - METROPOLITAN AREA CLINIC 6 | Facility: CLINIC | Age: 88
Setting detail: DERMATOLOGY
End: 2023-06-22

## 2023-06-22 DIAGNOSIS — L81.4 OTHER MELANIN HYPERPIGMENTATION: ICD-10-CM

## 2023-06-22 DIAGNOSIS — D22 MELANOCYTIC NEVI: ICD-10-CM

## 2023-06-22 DIAGNOSIS — Z71.89 OTHER SPECIFIED COUNSELING: ICD-10-CM

## 2023-06-22 DIAGNOSIS — Z85.828 PERSONAL HISTORY OF OTHER MALIGNANT NEOPLASM OF SKIN: ICD-10-CM

## 2023-06-22 DIAGNOSIS — D18.0 HEMANGIOMA: ICD-10-CM

## 2023-06-22 DIAGNOSIS — L82.1 OTHER SEBORRHEIC KERATOSIS: ICD-10-CM

## 2023-06-22 DIAGNOSIS — L57.0 ACTINIC KERATOSIS: ICD-10-CM

## 2023-06-22 PROBLEM — D48.5 NEOPLASM OF UNCERTAIN BEHAVIOR OF SKIN: Status: ACTIVE | Noted: 2023-06-22

## 2023-06-22 PROBLEM — D18.01 HEMANGIOMA OF SKIN AND SUBCUTANEOUS TISSUE: Status: ACTIVE | Noted: 2023-06-22

## 2023-06-22 PROBLEM — D22.5 MELANOCYTIC NEVI OF TRUNK: Status: ACTIVE | Noted: 2023-06-22

## 2023-06-22 PROCEDURE — ? BIOPSY BY SHAVE METHOD

## 2023-06-22 PROCEDURE — ? SUNSCREEN RECOMMENDATIONS

## 2023-06-22 PROCEDURE — 11102 TANGNTL BX SKIN SINGLE LES: CPT

## 2023-06-22 PROCEDURE — ? LIQUID NITROGEN

## 2023-06-22 PROCEDURE — 17000 DESTRUCT PREMALG LESION: CPT | Mod: 59

## 2023-06-22 PROCEDURE — ? SUNSCREEN TREATMENT REGIMEN

## 2023-06-22 PROCEDURE — ? COUNSELING

## 2023-06-22 PROCEDURE — 99213 OFFICE O/P EST LOW 20 MIN: CPT | Mod: 25

## 2023-06-22 ASSESSMENT — LOCATION DETAILED DESCRIPTION DERM
LOCATION DETAILED: LEFT PROXIMAL DORSAL FOREARM
LOCATION DETAILED: NASAL SUPRATIP
LOCATION DETAILED: EPIGASTRIC SKIN
LOCATION DETAILED: LEFT CENTRAL EYEBROW
LOCATION DETAILED: SUBXIPHOID
LOCATION DETAILED: LEFT DORSAL MIDDLE METACARPOPHALANGEAL JOINT
LOCATION DETAILED: RIGHT RADIAL DORSAL HAND
LOCATION DETAILED: MIDDLE STERNUM

## 2023-06-22 ASSESSMENT — LOCATION ZONE DERM
LOCATION ZONE: NOSE
LOCATION ZONE: FACE
LOCATION ZONE: HAND
LOCATION ZONE: TRUNK
LOCATION ZONE: ARM

## 2023-06-22 ASSESSMENT — LOCATION SIMPLE DESCRIPTION DERM
LOCATION SIMPLE: RIGHT HAND
LOCATION SIMPLE: CHEST
LOCATION SIMPLE: ABDOMEN
LOCATION SIMPLE: LEFT EYEBROW
LOCATION SIMPLE: LEFT FOREARM
LOCATION SIMPLE: NOSE
LOCATION SIMPLE: LEFT HAND

## 2023-06-22 NOTE — PROCEDURE: LIQUID NITROGEN
Show Aperture Variable?: Yes
Detail Level: Detailed
Number Of Freeze-Thaw Cycles: 2 freeze-thaw cycles
Render Post-Care Instructions In Note?: no
Consent: The patient's consent was obtained including but not limited to risks of crusting, scabbing, blistering, scarring, darker or lighter pigmentary change, recurrence, incomplete removal and infection.
Post-Care Instructions: I reviewed with the patient in detail post-care instructions. Patient is to wear sunprotection, and avoid picking at any of the treated lesions. Pt may apply Vaseline to crusted or scabbing areas.
Duration Of Freeze Thaw-Cycle (Seconds): 10

## 2023-08-10 ENCOUNTER — OFFICE VISIT (OUTPATIENT)
Dept: MEDICAL GROUP | Facility: CLINIC | Age: 88
End: 2023-08-10
Payer: MEDICARE

## 2023-08-10 VITALS
WEIGHT: 180.7 LBS | SYSTOLIC BLOOD PRESSURE: 163 MMHG | HEIGHT: 63 IN | BODY MASS INDEX: 32.02 KG/M2 | DIASTOLIC BLOOD PRESSURE: 89 MMHG | TEMPERATURE: 97.7 F | OXYGEN SATURATION: 90 % | HEART RATE: 85 BPM

## 2023-08-10 DIAGNOSIS — I10 PRIMARY HYPERTENSION: ICD-10-CM

## 2023-08-10 DIAGNOSIS — E03.9 HYPOTHYROIDISM, UNSPECIFIED TYPE: ICD-10-CM

## 2023-08-10 DIAGNOSIS — E78.5 HYPERLIPIDEMIA, UNSPECIFIED HYPERLIPIDEMIA TYPE: ICD-10-CM

## 2023-08-10 DIAGNOSIS — Z00.00 MEDICARE ANNUAL WELLNESS VISIT, SUBSEQUENT: ICD-10-CM

## 2023-08-10 PROCEDURE — 99214 OFFICE O/P EST MOD 30 MIN: CPT | Performed by: STUDENT IN AN ORGANIZED HEALTH CARE EDUCATION/TRAINING PROGRAM

## 2023-08-10 PROCEDURE — 3079F DIAST BP 80-89 MM HG: CPT | Performed by: STUDENT IN AN ORGANIZED HEALTH CARE EDUCATION/TRAINING PROGRAM

## 2023-08-10 PROCEDURE — 3077F SYST BP >= 140 MM HG: CPT | Performed by: STUDENT IN AN ORGANIZED HEALTH CARE EDUCATION/TRAINING PROGRAM

## 2023-08-10 ASSESSMENT — FIBROSIS 4 INDEX: FIB4 SCORE: 1.75

## 2023-08-10 NOTE — PROGRESS NOTES
Subjective:    CC: Patient presents with:  Paperwork: NUPURV paperwork      HPI:   Camelia Frederick is a 89 y.o. female who presents for annual exam    BP: Elevated during today's visit, patient feels this may have been related to her being in the doctor's office.  Denies blurry vision, chest pain, shortness of breath.    Patient has GYN provider: No   Last Pap Smear: Not indicated  H/O Abnormal Pap: No  Last Mammogram: Several years ago, no h/o abnormal mammograms  Last Bone Density Test: Feb 2023  FINDINGS:  The lumbar spine has a mean bone mineral density of 0.996 g/cm2, with a T score of -1.5 and a Z score of 0.0.     The proximal left femur has a mean bone mineral density of 0.753 g/cm2, with a T score of -2.0 and a Z score of 0.2.     IMPRESSION:     According to the World Health Organization classification, bone mineral density of this patient is osteopenic in the lumbar spine and left proximal femur.  Last Colorectal Cancer Screening: Aged out  Last Tdap:   Received HPV series: Aged out    Exercise: moderate regular exercise, aerobic < 3 days a week  Diet:     No LMP recorded. Patient is postmenopausal.    OB History    No obstetric history on file.   She  has no history on file for sexual activity.    She  has a past medical history of Anemia, Blood clotting disorder (HCC) (03/09/2018), Breath shortness (03/09/2018), Cataract, Cold (03/09/2018), Dental disorder, Diabetes, High cholesterol, Hypercholesteremia, Hypertension, Hypothyroid, and Renal disorder (1990's).  She  has a past surgical history that includes tonsillectomy (1944); cataract extraction with iol (Bilateral, 2015/2016); eye surgery (Left, 2015); thyroidectomy (1980's); lithotripsy (1980's); ptosis repair (Bilateral, 10/5/2016); and penetrating keratoplasty (Left, 3/13/2018).    No family history on file.    Social History    Tobacco Use      Smoking status: Never      Smokeless tobacco: Never    Alcohol use: Yes      Comment: 4-5 per  week    Drug use: No      Patient Active Problem List    Tension type headache         Date Noted: 05/01/2023      Health care maintenance         Date Noted: 09/21/2022      Primary hypertension         Date Noted: 09/21/2022      Encounter for examination following treatment at hospital         Date Noted: 04/27/2022      Acquired hypothyroidism         Date Noted: 04/11/2022      Type 2 diabetes mellitus without complication (HCC)         Date Noted: 04/11/2022      Hyperlipidemia         Date Noted: 04/11/2022      Neoplasm of uncertain behavior of skin         Date Noted: 10/07/2021      Neoplastic disease         Date Noted: 09/06/2019      Overweight with body mass index (BMI) 25.0-29.9         Date Noted: 06/25/2019      Balance problems         Date Noted: 05/03/2019      Ptosis of both eyelids         Date Noted: 10/05/2016      Current Outpatient Medications:  amLODIPine (NORVASC) 5 MG Tab, Take 1 Tablet by mouth every day., Disp: 90 Tablet, Rfl: 3  levothyroxine (SYNTHROID) 137 MCG Tab, TAKE 1 TABLET BY MOUTH EVERY MORNING ON AN EMPTY STOMACH FOR 90 DAYS., Disp: 90 Tablet, Rfl: 3  valsartan-hydrochlorothiazide (DIOVAN-HCT) 160-25 MG per tablet, Take 1 Tablet by mouth every day., Disp: 90 Tablet, Rfl: 3  metFORMIN (GLUCOPHAGE) 500 MG Tab, Take 1 Tablet by mouth every day., Disp: 90 Tablet, Rfl: 3  atenolol (TENORMIN) 50 MG Tab, TAKE 1 TABLET BY MOUTH EVERY DAY, Disp: 90 Tablet, Rfl: 3  simvastatin (ZOCOR) 20 MG Tab, TAKE 1 TABLET BY MOUTH EVERY EVENING, Disp: 90 Tablet, Rfl: 3  ALPHAGAN P 0.1 % Solution, INSTILL 1 DROP INTO LEFT EYE TWICE A DAY, Disp: , Rfl:   ascorbic acid (ASCORBIC ACID) 500 MG Tab, Take 500 mg by mouth 4 times a day., Disp: , Rfl:   Cholecalciferol (VITAMIN D3) 2000 UNIT Cap, Take  by mouth every day., Disp: , Rfl:   Multiple Vitamins-Minerals (MULTIVITAMIN ADULT) Tab, Take  by mouth every day., Disp: , Rfl:   B Complex-C (SUPER B COMPLEX PO), Take  by mouth every day., Disp: , Rfl:  "  Calcium Citrate-Vitamin D (CALCIUM CITRATE + D PO), Take  by mouth every day., Disp: , Rfl:   Omega-3 Fatty Acids (FISH OIL PO), Take  by mouth every day., Disp: , Rfl:     No current facility-administered medications for this visit.     -- Codeine -- Nausea   -- Nickel -- Rash    --  Rash    Review of Systems No acute concerns today  Constitutional: Negative for fever, chills and malaise/fatigue.   HENT: Negative for congestion.    Eyes: Negative for pain.   Respiratory: Negative for cough and shortness of breath.    Cardiovascular: Negative for chest pain and leg swelling.   Gastrointestinal: Negative for nausea, vomiting, abdominal pain and diarrhea.   Genitourinary: Negative for dysuria and hematuria.   Skin: Negative for rash.   Neurological: Negative for dizziness, focal weakness and headaches.   Endo/Heme/Allergies: Does not bruise/bleed easily.   Psychiatric/Behavioral: Negative for depression.  The patient is not nervous/anxious.      Objective:  BP (!) 163/89 (BP Location: Left arm, Patient Position: Sitting, BP Cuff Size: Adult)   Pulse 85   Temp 36.5 °C (97.7 °F) (Temporal)   Ht 1.6 m (5' 3\")   Wt 82 kg (180 lb 11.2 oz)   SpO2 90%   BMI 32.01 kg/m²     Wt Readings from Last 4 Encounters:  08/10/23 : 82 kg (180 lb 11.2 oz)  05/01/23 : 80.3 kg (177 lb 2 oz)  03/10/23 : 79.2 kg (174 lb 8 oz)  12/21/22 : (P) 80.6 kg (177 lb 12.8 oz)          Physical Exam  Constitutional: Well-developed and well-nourished. Not diaphoretic. No distress.   Skin: Skin is warm and dry. No rash noted.  Head: Atraumatic without lesions.  Eyes: Conjunctivae and extraocular motions are normal. Pupils are equal, round, and reactive to light. No scleral icterus.   Ears:  External ears unremarkable. Tympanic membranes clear and intact.  Nose: Nares patent. Septum midline. Turbinates without erythema nor edema. No discharge.   Mouth/Throat: Tongue normal. Oropharynx is clear and moist. Posterior pharynx without erythema or " exudates.  Neck: Supple, trachea midline. Normal range of motion. No thyromegaly present. No lymphadenopathy--cervical or supraclavicular.  Cardiovascular: Regular rate and rhythm, S1 and S2 without murmur, rubs, or gallops.    Respiratory: Effort normal. Clear to auscultation throughout. No adventitious sounds.     Abdomen: Soft, non tender, and without distention. Active bowel sounds in all four quadrants. No rebound, guarding, masses or HSM.    Extremities: No cyanosis, clubbing, erythema, nor edema. Distal pulses intact and symmetric.   Musculoskeletal: All major joints AROM full in all directions without pain.  Neurological: Alert and oriented x 3. Grossly non-focal. Strength and sensation grossly intact. DTRs 2+/3 and symmetric.  Short-term memory intact with 3 item recall both initial and delayed, clock drawing test intact.  Psychiatric:  Behavior, mood, and affect are appropriate.    Assessment and Plan:    This is a 89-year-old female presenting for annual wellness visit and for 's license clearance.  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  For this, the patient and I got in contact with her ophthalmologist office this morning where she will present to have the ophthalmologic component of the DMV paperwork signed.  I have signed the other components of her paperwork.    Regarding screenings, patient is up-to-date on all of these.  She is on the appropriate supplementation for her diagnosis of osteopenia.  No gynecologic concerns.    Regarding chronic conditions including hyperlipidemia and hypothyroidism, lipid profile and TSH have been ordered.  Given history of hypertension, BMP ordered.     Elevated blood pressure  In the setting of being in the clinic, not severe range, patient is asymptomatic from hypertensive  emergency standpoint, physical exam within normal limits.  -Continue to monitor, continue with current blood pressure medications    This patient was discussed with my attending physician  Johny Connor MD

## 2023-11-21 ENCOUNTER — OFFICE VISIT (OUTPATIENT)
Dept: MEDICAL GROUP | Facility: CLINIC | Age: 88
End: 2023-11-21
Payer: MEDICARE

## 2023-11-21 VITALS
HEIGHT: 64 IN | WEIGHT: 173.7 LBS | BODY MASS INDEX: 29.65 KG/M2 | SYSTOLIC BLOOD PRESSURE: 130 MMHG | OXYGEN SATURATION: 96 % | TEMPERATURE: 97.6 F | HEART RATE: 88 BPM | DIASTOLIC BLOOD PRESSURE: 80 MMHG

## 2023-11-21 DIAGNOSIS — G45.9 TIA (TRANSIENT ISCHEMIC ATTACK): ICD-10-CM

## 2023-11-21 DIAGNOSIS — R41.0 CONFUSION: ICD-10-CM

## 2023-11-21 PROCEDURE — 99214 OFFICE O/P EST MOD 30 MIN: CPT | Performed by: FAMILY MEDICINE

## 2023-11-21 PROCEDURE — 3075F SYST BP GE 130 - 139MM HG: CPT | Performed by: FAMILY MEDICINE

## 2023-11-21 PROCEDURE — 3079F DIAST BP 80-89 MM HG: CPT | Performed by: FAMILY MEDICINE

## 2023-11-21 ASSESSMENT — FIBROSIS 4 INDEX: FIB4 SCORE: 1.77

## 2023-11-21 NOTE — ASSESSMENT & PLAN NOTE
These symptoms are concerning for TIA but are now well post-acute. She is advised to go straight to the ER for recurrence. I have placed an urgent referral to Neurology. I will order carotid doppler and baseline labs.  Her exam is normal now including neurologic and cardiac.  Start one baby asa a day.  She is advised not to drive.     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

## 2023-11-21 NOTE — PROGRESS NOTES
"Subjective:     CC: confusional episode    HPI:   Camelia presents today to discuss the following issues       Problem   Confusion    Ana had an episode a month ago while driving where her vision altered in a way that everything \"looked gray\". This lasted for about 5 blocks and resolved. The next day she was again driving when she became confused as to where she was and which way her home was. This resolved entirely and she has had no similar symptoms since. She denies muscle weakness, other visual change, droop, changes in speech, or headache.          Current Outpatient Medications Ordered in Epic   Medication Sig Dispense Refill    amLODIPine (NORVASC) 5 MG Tab Take 1 Tablet by mouth every day. 90 Tablet 3    levothyroxine (SYNTHROID) 137 MCG Tab TAKE 1 TABLET BY MOUTH EVERY MORNING ON AN EMPTY STOMACH FOR 90 DAYS. 90 Tablet 3    valsartan-hydrochlorothiazide (DIOVAN-HCT) 160-25 MG per tablet Take 1 Tablet by mouth every day. 90 Tablet 3    metFORMIN (GLUCOPHAGE) 500 MG Tab Take 1 Tablet by mouth every day. 90 Tablet 3    atenolol (TENORMIN) 50 MG Tab TAKE 1 TABLET BY MOUTH EVERY DAY 90 Tablet 3    simvastatin (ZOCOR) 20 MG Tab TAKE 1 TABLET BY MOUTH EVERY EVENING 90 Tablet 3    ALPHAGAN P 0.1 % Solution INSTILL 1 DROP INTO LEFT EYE TWICE A DAY      ascorbic acid (ASCORBIC ACID) 500 MG Tab Take 500 mg by mouth 4 times a day.      Cholecalciferol (VITAMIN D3) 2000 UNIT Cap Take  by mouth every day.      Multiple Vitamins-Minerals (MULTIVITAMIN ADULT) Tab Take  by mouth every day.      B Complex-C (SUPER B COMPLEX PO) Take  by mouth every day.      Calcium Citrate-Vitamin D (CALCIUM CITRATE + D PO) Take  by mouth every day.      Omega-3 Fatty Acids (FISH OIL PO) Take  by mouth every day.       No current [facility]-ordered facility-administered medications on file.       Health Maintenance:     ROS:  Gen: no fevers/chills, no changes in weight  Eyes: no changes in vision  ENT: no sore throat, no hearing loss, no " "bloody nose  Pulm: no sob, no cough  CV: no chest pain, no palpitations  GI: no nausea/vomiting, no diarrhea  : no dysuria  MSk: no myalgias  Skin: no rash  Neuro: no headaches, no numbness/tingling  Heme/Lymph: no easy bruising      Objective:     Exam:  /80 (BP Location: Left arm, Patient Position: Sitting, BP Cuff Size: Adult)   Pulse 88   Temp 36.4 °C (97.6 °F) (Temporal)   Ht 1.626 m (5' 4\")   Wt 78.8 kg (173 lb 11.2 oz)   SpO2 96%   BMI 29.82 kg/m²  Body mass index is 29.82 kg/m².    Gen: Alert and oriented, No apparent distress.  Neck: Neck is supple without lymphadenopathy.  Lungs: Normal effort, CTA bilaterally, no wheezes, rhonchi, or rales  CV: Regular rate and rhythm. No murmurs, rubs, or gallops.  Ext: No clubbing, cyanosis, edema.          Assessment & Plan:     89 y.o. female with the following -     Problem List Items Addressed This Visit       Confusion     These symptoms are concerning for TIA but are now well post-acute. She is advised to go straight to the ER for recurrence. I have placed an urgent referral to Neurology. I will order carotid doppler and baseline labs.  Her exam is normal now including neurologic and cardiac.  Start one baby asa a day.  She is advised not to drive.     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            "

## 2023-11-27 ENCOUNTER — TELEPHONE (OUTPATIENT)
Dept: HEALTH INFORMATION MANAGEMENT | Facility: OTHER | Age: 88
End: 2023-11-27

## 2023-11-29 ENCOUNTER — NON-PROVIDER VISIT (OUTPATIENT)
Dept: CARDIOLOGY | Facility: MEDICAL CENTER | Age: 88
End: 2023-11-29

## 2023-11-29 ENCOUNTER — PATIENT MESSAGE (OUTPATIENT)
Dept: HEALTH INFORMATION MANAGEMENT | Facility: OTHER | Age: 88
End: 2023-11-29

## 2023-11-29 ENCOUNTER — OFFICE VISIT (OUTPATIENT)
Dept: NEUROLOGY | Facility: MEDICAL CENTER | Age: 88
End: 2023-11-29
Attending: PSYCHIATRY & NEUROLOGY
Payer: MEDICARE

## 2023-11-29 VITALS
BODY MASS INDEX: 30.19 KG/M2 | DIASTOLIC BLOOD PRESSURE: 68 MMHG | OXYGEN SATURATION: 94 % | TEMPERATURE: 97.6 F | WEIGHT: 176.81 LBS | SYSTOLIC BLOOD PRESSURE: 118 MMHG | HEIGHT: 64 IN | HEART RATE: 73 BPM

## 2023-11-29 DIAGNOSIS — R00.0 WIDE-COMPLEX TACHYCARDIA: ICD-10-CM

## 2023-11-29 DIAGNOSIS — G45.9 TRANSIENT ISCHEMIC ATTACK (TIA): ICD-10-CM

## 2023-11-29 DIAGNOSIS — I47.10 SVT (SUPRAVENTRICULAR TACHYCARDIA) (HCC): ICD-10-CM

## 2023-11-29 DIAGNOSIS — I48.0 PAF (PAROXYSMAL ATRIAL FIBRILLATION) (HCC): ICD-10-CM

## 2023-11-29 PROCEDURE — 3078F DIAST BP <80 MM HG: CPT | Performed by: PSYCHIATRY & NEUROLOGY

## 2023-11-29 PROCEDURE — 99212 OFFICE O/P EST SF 10 MIN: CPT | Performed by: PSYCHIATRY & NEUROLOGY

## 2023-11-29 PROCEDURE — 99204 OFFICE O/P NEW MOD 45 MIN: CPT | Performed by: PSYCHIATRY & NEUROLOGY

## 2023-11-29 PROCEDURE — 3074F SYST BP LT 130 MM HG: CPT | Performed by: PSYCHIATRY & NEUROLOGY

## 2023-11-29 PROCEDURE — 93228 REMOTE 30 DAY ECG REV/REPORT: CPT | Performed by: INTERNAL MEDICINE

## 2023-11-29 ASSESSMENT — FIBROSIS 4 INDEX: FIB4 SCORE: 1.77

## 2023-11-29 NOTE — PROGRESS NOTES
"Chief Complaint   Patient presents with    New Patient     TIA        History of present illness:  Camelia Frederick 89 y.o. female with recent episode a month ago:     \"Her vision altered in a way that everything \"looked gray\" only affecting the R eye. Her vision was blurry but she was able to still see. She is blind in her left eye. This lasted for about 5 blocks and resolved.     The next day she was again driving in a familiar area when she became lost, lasting for a few minutes, afterwards she regained her sense of direction and navigated home.     She has been having increasing headaches recently in the occiput.     Her PCP ordered a carotid ultrasound.     Past medical history:   Past Medical History:   Diagnosis Date    Anemia     \"In the past\"    Blood clotting disorder (HCC) 03/09/2018    Left eye 2 years ago.    Breath shortness 03/09/2018    Occasional with exertion    Cataract     bilateral IOL    Cold 03/09/2018    Pt states feels like there may be phlem building up in her throat, feels like \"I might be coming down with something\".  Pt instructed to call Dr. Rasmussen's office if she develops a sore throat, cold and/or fever.  Pt verbalized an understanding.    Dental disorder     lower partial denture    Diabetes     oral medication    High cholesterol     Hypercholesteremia     Hypertension     Hypothyroid     thyroidectomy    Renal disorder 1990's    stones       Past surgical history:   Past Surgical History:   Procedure Laterality Date    PENETRATING KERATOPLASTY Left 3/13/2018    Procedure: DESCEMETS STRIPPING ENDOTHELIAL KERATOPLASTY;  Surgeon: Guille Rasmussen M.D.;  Location: SURGERY SAME DAY [facility];  Service: Ophthalmology    PTOSIS REPAIR Bilateral 10/5/2016    Procedure: PTOSIS REPAIR upper lid (levator advanced set up);  Surgeon: Silvino Seay M.D.;  Location: SURGERY SURGICAL [facility];  Service:     EYE SURGERY Left 2015    Retinal repair \"blood clot removed\"    THYROIDECTOMY  " 1980's    LITHOTRIPSY  1980's    kidney stone    TONSILLECTOMY  1944    CATARACT EXTRACTION WITH IOL Bilateral 2015/2016       Family history:   No family history on file.    Social history:   Social History     Socioeconomic History    Marital status: Single     Spouse name: Not on file    Number of children: Not on file    Years of education: Not on file    Highest education level: Bachelor's degree (e.g., BA, AB, BS)   Occupational History    Not on file   Tobacco Use    Smoking status: Never    Smokeless tobacco: Never   Vaping Use    Vaping Use: Never used   Substance and Sexual Activity    Alcohol use: Yes     Comment: 4-5 per week    Drug use: No    Sexual activity: Not on file   Other Topics Concern    Not on file   Social History Narrative    Not on file     Social Determinants of Health     Financial Resource Strain: Low Risk  (2/21/2023)    Overall Financial Resource Strain (CARDIA)     Difficulty of Paying Living Expenses: Not hard at all   Food Insecurity: No Food Insecurity (2/21/2023)    Hunger Vital Sign     Worried About Running Out of Food in the Last Year: Never true     Ran Out of Food in the Last Year: Never true   Transportation Needs: No Transportation Needs (2/21/2023)    PRAPARE - Transportation     Lack of Transportation (Medical): No     Lack of Transportation (Non-Medical): No   Physical Activity: Inactive (2/21/2023)    Exercise Vital Sign     Days of Exercise per Week: 0 days     Minutes of Exercise per Session: 0 min   Stress: No Stress Concern Present (2/21/2023)    Citizen of [place] of Occupational Health - Occupational Stress Questionnaire     Feeling of Stress : Not at all   Social Connections: Moderately Isolated (2/21/2023)    Social Connection and Isolation Panel [NHANES]     Frequency of Communication with Friends and Family: More than three times a week     Frequency of Social Gatherings with Friends and Family: Twice a week     Attends [employer] Services: Never     Active  "Member of Clubs or Organizations: Yes     Attends Club or Organization Meetings: More than 4 times per year     Marital Status:    Intimate Partner Violence: Not on file   Housing Stability: Low Risk  (2/21/2023)    Housing Stability Vital Sign     Unable to Pay for Housing in the Last Year: No     Number of Places Lived in the Last Year: 1     Unstable Housing in the Last Year: No       Current medications:   Current Outpatient Medications   Medication    amLODIPine (NORVASC) 5 MG Tab    levothyroxine (SYNTHROID) 137 MCG Tab    valsartan-hydrochlorothiazide (DIOVAN-HCT) 160-25 MG per tablet    metFORMIN (GLUCOPHAGE) 500 MG Tab    atenolol (TENORMIN) 50 MG Tab    simvastatin (ZOCOR) 20 MG Tab    ALPHAGAN P 0.1 % Solution    ascorbic acid (ASCORBIC ACID) 500 MG Tab    Cholecalciferol (VITAMIN D3) 2000 UNIT Cap    Multiple Vitamins-Minerals (MULTIVITAMIN ADULT) Tab    B Complex-C (SUPER B COMPLEX PO)    Calcium Citrate-Vitamin D (CALCIUM CITRATE + D PO)    Omega-3 Fatty Acids (FISH OIL PO)     No current facility-administered medications for this visit.       Medication Allergy:  Allergies   Allergen Reactions    Codeine Nausea    Nickel Rash     Rash       Physical examination:   Vitals:    11/29/23 1229   BP: 118/68   BP Location: Left arm   Patient Position: Sitting   BP Cuff Size: Large adult   Pulse: 73   Temp: 36.4 °C (97.6 °F)   TempSrc: Temporal   SpO2: 94%   Weight: 80.2 kg (176 lb 12.9 oz)   Height: 1.626 m (5' 4.02\")     Neurological Exam  Mental Status  Awake and alert. Speech is normal. Language is fluent with no aphasia.    Cranial Nerves  CN II: Right funduscopic exam: disc intact.  CN III, IV, VI: Extraocular movements intact bilaterally. No nystagmus. Normal smooth pursuit.    Motor   No pronator drift.    Sensory  Light touch is normal in upper and lower extremities.     Gait  Casual gait is normal including stance, stride, and arm swing.      Labs:  I reviewed the following labs " personally:  None    Imaging:   None     ASSESSMENT AND PLAN:  Problem List Items Addressed This Visit    None  Visit Diagnoses       Transient ischemic attack (TIA)        Relevant Orders    MR-BRAIN-W/O    Cardiac Event Monitor            1. Transient ischemic attack (TIA)  - MR-BRAIN-W/O; Future  - Cardiac Event Monitor; Future    89-year-old female with a brief episode of graying of right vision.  She endorses that there is a gray film over her vision for 5 blocks.  There were no other associated symptoms.  I have recommended that a TIA workup be completed, and in addition to the carotid ultrasound, I have ordered a 14-day Zio patch as well as a brain MRI scan.  This history is not definite for a transient ischemic attack, however given her age, a workup will be performed to exclude carotid artery stenosis as well as frequent atrial fibrillation.  Of note the patient has been endorsing occipital headaches as well, and visual aura could explain this event.    FOLLOW-UP:   Return in about 6 weeks (around 1/10/2024).    Total time spent for the day for this patient unrelated to procedure time is: 25 minutes. I spent 15 minutes in face to face time and I spent 5 minutes pre-charting and 5 minutes in post-visit documentation.      Dr. Baldo Kay D.O.  [facility]

## 2023-11-29 NOTE — PATIENT INSTRUCTIONS
Your primary doctor ordered a carotid ultrasound     I have ordered a cardiac event monitor for A-fib. Mail the patch in after 2 weeks.     I have ordered a brain MRI scan.     Continue aspirin 81mg daily

## 2023-12-06 ENCOUNTER — HOSPITAL ENCOUNTER (OUTPATIENT)
Dept: LAB | Facility: MEDICAL CENTER | Age: 88
End: 2023-12-06
Attending: FAMILY MEDICINE
Payer: MEDICARE

## 2023-12-06 DIAGNOSIS — G45.9 TIA (TRANSIENT ISCHEMIC ATTACK): ICD-10-CM

## 2023-12-06 LAB
ALBUMIN SERPL BCP-MCNC: 4.1 G/DL (ref 3.2–4.9)
ALBUMIN/GLOB SERPL: 1.2 G/DL
ALP SERPL-CCNC: 56 U/L (ref 30–99)
ALT SERPL-CCNC: 16 U/L (ref 2–50)
ANION GAP SERPL CALC-SCNC: 12 MMOL/L (ref 7–16)
AST SERPL-CCNC: 21 U/L (ref 12–45)
BASOPHILS # BLD AUTO: 1 % (ref 0–1.8)
BASOPHILS # BLD: 0.08 K/UL (ref 0–0.12)
BILIRUB SERPL-MCNC: 0.3 MG/DL (ref 0.1–1.5)
BUN SERPL-MCNC: 28 MG/DL (ref 8–22)
CALCIUM ALBUM COR SERPL-MCNC: 9.2 MG/DL (ref 8.5–10.5)
CALCIUM SERPL-MCNC: 9.3 MG/DL (ref 8.5–10.5)
CHLORIDE SERPL-SCNC: 102 MMOL/L (ref 96–112)
CHOLEST SERPL-MCNC: 129 MG/DL (ref 100–199)
CO2 SERPL-SCNC: 27 MMOL/L (ref 20–33)
CREAT SERPL-MCNC: 0.62 MG/DL (ref 0.5–1.4)
EOSINOPHIL # BLD AUTO: 0.07 K/UL (ref 0–0.51)
EOSINOPHIL NFR BLD: 0.9 % (ref 0–6.9)
ERYTHROCYTE [DISTWIDTH] IN BLOOD BY AUTOMATED COUNT: 43.6 FL (ref 35.9–50)
GFR SERPLBLD CREATININE-BSD FMLA CKD-EPI: 84 ML/MIN/1.73 M 2
GLOBULIN SER CALC-MCNC: 3.5 G/DL (ref 1.9–3.5)
GLUCOSE SERPL-MCNC: 137 MG/DL (ref 65–99)
HCT VFR BLD AUTO: 44.5 % (ref 37–47)
HDLC SERPL-MCNC: 53 MG/DL
HGB BLD-MCNC: 15.1 G/DL (ref 12–16)
IMM GRANULOCYTES # BLD AUTO: 0.01 K/UL (ref 0–0.11)
IMM GRANULOCYTES NFR BLD AUTO: 0.1 % (ref 0–0.9)
LDLC SERPL CALC-MCNC: 39 MG/DL
LYMPHOCYTES # BLD AUTO: 3.32 K/UL (ref 1–4.8)
LYMPHOCYTES NFR BLD: 41.4 % (ref 22–41)
MCH RBC QN AUTO: 32.1 PG (ref 27–33)
MCHC RBC AUTO-ENTMCNC: 33.9 G/DL (ref 32.2–35.5)
MCV RBC AUTO: 94.5 FL (ref 81.4–97.8)
MONOCYTES # BLD AUTO: 0.68 K/UL (ref 0–0.85)
MONOCYTES NFR BLD AUTO: 8.5 % (ref 0–13.4)
NEUTROPHILS # BLD AUTO: 3.85 K/UL (ref 1.82–7.42)
NEUTROPHILS NFR BLD: 48.1 % (ref 44–72)
NRBC # BLD AUTO: 0 K/UL
NRBC BLD-RTO: 0 /100 WBC (ref 0–0.2)
PLATELET # BLD AUTO: 253 K/UL (ref 164–446)
PMV BLD AUTO: 11.6 FL (ref 9–12.9)
POTASSIUM SERPL-SCNC: 3.8 MMOL/L (ref 3.6–5.5)
PROT SERPL-MCNC: 7.6 G/DL (ref 6–8.2)
RBC # BLD AUTO: 4.71 M/UL (ref 4.2–5.4)
SODIUM SERPL-SCNC: 141 MMOL/L (ref 135–145)
TRIGL SERPL-MCNC: 183 MG/DL (ref 0–149)
TSH SERPL DL<=0.005 MIU/L-ACNC: 1.16 UIU/ML (ref 0.38–5.33)
WBC # BLD AUTO: 8 K/UL (ref 4.8–10.8)

## 2023-12-06 PROCEDURE — 85025 COMPLETE CBC W/AUTO DIFF WBC: CPT

## 2023-12-06 PROCEDURE — 80061 LIPID PANEL: CPT

## 2023-12-06 PROCEDURE — 36415 COLL VENOUS BLD VENIPUNCTURE: CPT

## 2023-12-06 PROCEDURE — 84443 ASSAY THYROID STIM HORMONE: CPT | Mod: GA

## 2023-12-06 PROCEDURE — 80053 COMPREHEN METABOLIC PANEL: CPT

## 2023-12-10 ENCOUNTER — HOSPITAL ENCOUNTER (OUTPATIENT)
Dept: RADIOLOGY | Facility: MEDICAL CENTER | Age: 88
End: 2023-12-10
Attending: PSYCHIATRY & NEUROLOGY
Payer: MEDICARE

## 2023-12-10 DIAGNOSIS — G45.9 TRANSIENT ISCHEMIC ATTACK (TIA): ICD-10-CM

## 2023-12-10 PROCEDURE — 70551 MRI BRAIN STEM W/O DYE: CPT

## 2023-12-11 RX ORDER — ATENOLOL 50 MG/1
TABLET ORAL
Qty: 90 TABLET | Refills: 3 | Status: SHIPPED | OUTPATIENT
Start: 2023-12-11

## 2023-12-14 ENCOUNTER — APPOINTMENT (OUTPATIENT)
Dept: RADIOLOGY | Facility: MEDICAL CENTER | Age: 88
End: 2023-12-14
Attending: FAMILY MEDICINE
Payer: MEDICARE

## 2023-12-14 DIAGNOSIS — G45.9 TIA (TRANSIENT ISCHEMIC ATTACK): ICD-10-CM

## 2023-12-14 PROCEDURE — 93880 EXTRACRANIAL BILAT STUDY: CPT

## 2023-12-21 ENCOUNTER — TELEPHONE (OUTPATIENT)
Dept: CARDIOLOGY | Facility: MEDICAL CENTER | Age: 88
End: 2023-12-21
Payer: MEDICARE

## 2023-12-21 DIAGNOSIS — G45.9 TRANSIENT ISCHEMIC ATTACK (TIA): ICD-10-CM

## 2023-12-28 NOTE — PROGRESS NOTES
Alfredo AT placed inpatient by Neurology.  Request sent to  BD (today's ADD) on 12/28 for review and signature.

## 2023-12-29 ENCOUNTER — PATIENT MESSAGE (OUTPATIENT)
Dept: NEUROLOGY | Facility: MEDICAL CENTER | Age: 88
End: 2023-12-29
Payer: MEDICARE

## 2023-12-29 ENCOUNTER — TELEPHONE (OUTPATIENT)
Dept: NEUROLOGY | Facility: MEDICAL CENTER | Age: 88
End: 2023-12-29

## 2023-12-29 DIAGNOSIS — G45.9 TRANSIENT ISCHEMIC ATTACK (TIA): ICD-10-CM

## 2023-12-29 DIAGNOSIS — I48.0 PAROXYSMAL A-FIB (HCC): Primary | ICD-10-CM

## 2023-12-29 NOTE — TELEPHONE ENCOUNTER
Received Refill PA request via [employer]  for Eliquis 5mg tab. (Quantity:180 tab, Day Supply:90)     Insurance: [facility]  Member ID:  [id number]  BIN: [id number]  PCN: [id number]  Group: [id number]     Ran Test claim via [place] & medication Pays for a $60.00 copay. Will outreach to patient to offer specialty pharmacy services and or release to preferred pharmacy

## 2024-02-02 RX ORDER — VALSARTAN AND HYDROCHLOROTHIAZIDE 160; 25 MG/1; MG/1
1 TABLET ORAL DAILY
Qty: 90 TABLET | Refills: 3 | Status: SHIPPED | OUTPATIENT
Start: 2024-02-02

## 2024-03-06 ENCOUNTER — OFFICE VISIT (OUTPATIENT)
Dept: MEDICAL GROUP | Facility: CLINIC | Age: 89
End: 2024-03-06
Payer: MEDICARE

## 2024-03-06 ENCOUNTER — OFFICE VISIT (OUTPATIENT)
Dept: NEUROLOGY | Facility: MEDICAL CENTER | Age: 89
End: 2024-03-06
Attending: PSYCHIATRY & NEUROLOGY
Payer: MEDICARE

## 2024-03-06 VITALS
BODY MASS INDEX: 27.76 KG/M2 | HEIGHT: 64 IN | SYSTOLIC BLOOD PRESSURE: 128 MMHG | DIASTOLIC BLOOD PRESSURE: 82 MMHG | HEART RATE: 82 BPM | TEMPERATURE: 97.6 F | OXYGEN SATURATION: 92 % | WEIGHT: 162.6 LBS

## 2024-03-06 VITALS
BODY MASS INDEX: 27.78 KG/M2 | DIASTOLIC BLOOD PRESSURE: 62 MMHG | HEIGHT: 64 IN | WEIGHT: 162.7 LBS | TEMPERATURE: 98 F | HEART RATE: 81 BPM | OXYGEN SATURATION: 94 % | SYSTOLIC BLOOD PRESSURE: 112 MMHG

## 2024-03-06 DIAGNOSIS — I48.0 PAROXYSMAL A-FIB (HCC): ICD-10-CM

## 2024-03-06 DIAGNOSIS — G45.9 TRANSIENT ISCHEMIC ATTACK (TIA): ICD-10-CM

## 2024-03-06 DIAGNOSIS — R41.0 CONFUSION: ICD-10-CM

## 2024-03-06 PROCEDURE — 3074F SYST BP LT 130 MM HG: CPT | Performed by: PSYCHIATRY & NEUROLOGY

## 2024-03-06 PROCEDURE — 3079F DIAST BP 80-89 MM HG: CPT | Performed by: FAMILY MEDICINE

## 2024-03-06 PROCEDURE — 99214 OFFICE O/P EST MOD 30 MIN: CPT | Performed by: FAMILY MEDICINE

## 2024-03-06 PROCEDURE — 99214 OFFICE O/P EST MOD 30 MIN: CPT | Performed by: PSYCHIATRY & NEUROLOGY

## 2024-03-06 PROCEDURE — 3074F SYST BP LT 130 MM HG: CPT | Performed by: FAMILY MEDICINE

## 2024-03-06 PROCEDURE — 3078F DIAST BP <80 MM HG: CPT | Performed by: PSYCHIATRY & NEUROLOGY

## 2024-03-06 PROCEDURE — 99212 OFFICE O/P EST SF 10 MIN: CPT | Performed by: PSYCHIATRY & NEUROLOGY

## 2024-03-06 ASSESSMENT — FIBROSIS 4 INDEX
FIB4 SCORE: 1.89
FIB4 SCORE: 1.89

## 2024-03-06 NOTE — ASSESSMENT & PLAN NOTE
I reviewed Dr. Kay's note and other results.   MRI showed only age-related atrophy and microvascular changes, there is no sign of infarct.  Zio patch did show a few very brief runs (4 beats) of V. tach similar of SVT.  Carotid doppler with minimal occlusion or plaque build up.   Ana will call the Neurology office for a follow up.  Issues to discuss will include ongoing anticoagulation, review of imaging and when she can return to driving.  Suspect she may need to see cardiology but did not place that referral.  She will check in with me by Marilyn if any difficulties getting follow-up or further questions.  Otherwise I will follow-up in about 3 months.

## 2024-03-06 NOTE — PROGRESS NOTES
"    Chief Complaint   Patient presents with    Follow-Up     Transient ischemic attack (TIA)         History of present illness:  Camelia Frederick 89 y.o. female with history of R amaurosis fugax.   \"Her vision altered in a way that everything \"looked gray\" only affecting the R eye. Her vision was blurry but she was able to still see. She is blind in her left eye. This lasted for about 5 blocks and resolved.      The next day she was again driving in a familiar area when she became lost, lasting for a few minutes, afterwards she regained her sense of direction and navigated home.     Past medical history:   Past Medical History:   Diagnosis Date    Anemia     \"In the past\"    Blood clotting disorder (HCC) 03/09/2018    Left eye 2 years ago.    Breath shortness 03/09/2018    Occasional with exertion    Cataract     bilateral IOL    Cold 03/09/2018    Pt states feels like there may be phlem building up in her throat, feels like \"I might be coming down with something\".  Pt instructed to call Dr. Rasmussen's office if she develops a sore throat, cold and/or fever.  Pt verbalized an understanding.    Dental disorder     lower partial denture    Diabetes     oral medication    High cholesterol     Hypercholesteremia     Hypertension     Hypothyroid     thyroidectomy    Renal disorder 1990's    stones       Past surgical history:   Past Surgical History:   Procedure Laterality Date    PENETRATING KERATOPLASTY Left 3/13/2018    Procedure: DESCEMETS STRIPPING ENDOTHELIAL KERATOPLASTY;  Surgeon: Guille Rasmussen M.D.;  Location: SURGERY SAME DAY [facility] ORS;  Service: Ophthalmology    PTOSIS REPAIR Bilateral 10/5/2016    Procedure: PTOSIS REPAIR upper lid (levator advanced set up);  Surgeon: Silivno Seay M.D.;  Location: SURGERY SURGICAL [facility] ORS;  Service:     EYE SURGERY Left 2015    Retinal repair \"blood clot removed\"    THYROIDECTOMY  1980's    LITHOTRIPSY  1980's    kidney stone    TONSILLECTOMY  1944    CATARACT " EXTRACTION WITH IOL Bilateral 2015/2016       Family history:   No family history on file.    Social history:   Social History     Socioeconomic History    Marital status: Single     Spouse name: Not on file    Number of children: Not on file    Years of education: Not on file    Highest education level: Bachelor's degree (e.g., BA, AB, BS)   Occupational History    Not on file   Tobacco Use    Smoking status: Never    Smokeless tobacco: Never   Vaping Use    Vaping Use: Never used   Substance and Sexual Activity    Alcohol use: Yes     Comment: 4-5 per week    Drug use: No    Sexual activity: Not on file   Other Topics Concern    Not on file   Social History Narrative    Not on file     Social Determinants of Health     Financial Resource Strain: Low Risk  (2/21/2023)    Overall Financial Resource Strain (CARDIA)     Difficulty of Paying Living Expenses: Not hard at all   Food Insecurity: No Food Insecurity (2/21/2023)    Hunger Vital Sign     Worried About Running Out of Food in the Last Year: Never true     Ran Out of Food in the Last Year: Never true   Transportation Needs: No Transportation Needs (2/21/2023)    PRAPARE - Transportation     Lack of Transportation (Medical): No     Lack of Transportation (Non-Medical): No   Physical Activity: Inactive (2/21/2023)    Exercise Vital Sign     Days of Exercise per Week: 0 days     Minutes of Exercise per Session: 0 min   Stress: No Stress Concern Present (2/21/2023)    [employer] [employer] - Occupational Stress Questionnaire     Feeling of Stress : Not at all   Social Connections: Moderately Isolated (2/21/2023)    Social Connection and Isolation Panel [NHANES]     Frequency of Communication with Friends and Family: More than three times a week     Frequency of Social Gatherings with Friends and Family: Twice a week     Attends Scientologist Services: Never     Active Member of Clubs or Organizations: Yes     Attends Club or Organization Meetings:  "More than 4 times per year     Marital Status:    Intimate Partner Violence: Not on file   Housing Stability: Low Risk  (2/21/2023)    Housing Stability Vital Sign     Unable to Pay for Housing in the Last Year: No     Number of Places Lived in the Last Year: 1     Unstable Housing in the Last Year: No       Current medications:   Current Outpatient Medications   Medication    valsartan-hydrochlorothiazide (DIOVAN-HCT) 160-25 MG per tablet    apixaban (ELIQUIS) 5mg Tab    atenolol (TENORMIN) 50 MG Tab    amLODIPine (NORVASC) 5 MG Tab    levothyroxine (SYNTHROID) 137 MCG Tab    metFORMIN (GLUCOPHAGE) 500 MG Tab    simvastatin (ZOCOR) 20 MG Tab    ALPHAGAN P 0.1 % Solution    ascorbic acid (ASCORBIC ACID) 500 MG Tab    Cholecalciferol (VITAMIN D3) 2000 UNIT Cap    Multiple Vitamins-Minerals (MULTIVITAMIN ADULT) Tab    B Complex-C (SUPER B COMPLEX PO)    Calcium Citrate-Vitamin D (CALCIUM CITRATE + D PO)    Omega-3 Fatty Acids (FISH OIL PO)     No current facility-administered medications for this visit.       Medication Allergy:  Allergies   Allergen Reactions    Codeine Nausea    Nickel Rash     Rash       Physical examination:   Vitals:    03/06/24 1335   BP: 112/62   BP Location: Left arm   Patient Position: Sitting   BP Cuff Size: Small adult   Pulse: 81   Temp: 36.7 °C (98 °F)   TempSrc: Temporal   SpO2: 94%   Weight: 73.8 kg (162 lb 11.2 oz)   Height: 1.626 m (5' 4.02\")     Imaging:   CAROTID U/S  IMPRESSION:     1.  There is a moderate amount of atherosclerotic plaque.  Plaque is located in carotid bulbs and proximal internal carotid arteries.  Plaque characterization:  calcific, irregular     2. There is a left internal carotid artery hemodynamically significant stenosis with diameter reduction measuring 50-69%. The right internal carotid artery demonstrates atherosclerosis less than 50% diameter reduction. There is no evidence of carotid   occlusion.     3.  Vertebral arteries demonstrate antegrade " flow.    Cardiac event monitor   Conclusions   1. Predominant sinus rhythm during examination   2.  Asymptomatic paroxysmal atrial fibrillation with intermittent rapid ventricular rate and noted aberrancy   3.  No triggered or diary entries   4.  Rare PVCs       ASSESSMENT AND PLAN:  Problem List Items Addressed This Visit    None  Visit Diagnoses       Transient ischemic attack (TIA)        Relevant Orders    REFERRAL TO CARDIOLOGY    Paroxysmal A-fib (HCC)        Relevant Orders    REFERRAL TO CARDIOLOGY            1. Transient ischemic attack (TIA)  - REFERRAL TO CARDIOLOGY    2. Paroxysmal A-fib (HCC)  - REFERRAL TO CARDIOLOGY    I have counseled the patient that her right amaurosis fugax is likely secondary to paroxysmal atrial fibrillation, which was detected on cardiac monitor recently.  Has been started on apixaban for anticoagulation, has been counseled this is a lifelong agent.  There was a carotid ultrasound which demonstrated moderate stenosis of the contralateral carotid artery, which does not need surgical intervention and is not the cause of the TIA..    FOLLOW-UP:   Return if symptoms worsen or fail to improve.    Total time spent for the day for this patient unrelated to procedure time is: 14 minutes. I spent 8 minutes in face to face time and I spent 3 minutes pre-charting and 3 minutes in post-visit documentation.      Dr. Baldo Kay D.O.  [facility]

## 2024-03-06 NOTE — PROGRESS NOTES
"Subjective:     CC: TIA f/u    HPI:   Camelia presents today to discuss the following issues           Problem   Confusion    Ana is back for follow-up.  As described below, she had an episode a few months ago with visual changes and then confusion that were concerning for a TIA.  Symptoms were distant at the time of our office visit so I placed her on aspirin, ordered a carotid Doppler and sent her to neurology.  Dr. Kay was able to see her quickly after that and added a Zio patch and MRI of the brain.  He also placed her on Eliquis.  Follow-up was intended to be 6 weeks but that appointment did not happen. It alfonso now been a couple of months.  She did not hear any results of any of these tests.  She has been relatively asymptomatic although she did have a brief episode a few weeks ago feeling slightly \"strange\" for a few minutes while at home.  She cannot really be more specific, there was no true confusion or visual loss at that time.    Prior visit:      Ana had an episode a month ago while driving where her vision altered in a way that everything \"looked gray\". This lasted for about 5 blocks and resolved. The next day she was again driving when she became confused as to where she was and which way her home was. This resolved entirely and she has had no similar symptoms since. She denies muscle weakness, other visual change, droop, changes in speech, or headache.          Current Outpatient Medications Ordered in Epic   Medication Sig Dispense Refill    valsartan-hydrochlorothiazide (DIOVAN-HCT) 160-25 MG per tablet TAKE 1 TABLET BY MOUTH EVERY DAY 90 Tablet 3    apixaban (ELIQUIS) 5mg Tab Take 1 Tablet by mouth 2 times a day. 180 Tablet 2    atenolol (TENORMIN) 50 MG Tab TAKE 1 TABLET BY MOUTH EVERY DAY 90 Tablet 3    amLODIPine (NORVASC) 5 MG Tab Take 1 Tablet by mouth every day. 90 Tablet 3    levothyroxine (SYNTHROID) 137 MCG Tab TAKE 1 TABLET BY MOUTH EVERY MORNING ON AN EMPTY STOMACH FOR 90 DAYS. " "90 Tablet 3    metFORMIN (GLUCOPHAGE) 500 MG Tab Take 1 Tablet by mouth every day. 90 Tablet 3    simvastatin (ZOCOR) 20 MG Tab TAKE 1 TABLET BY MOUTH EVERY EVENING 90 Tablet 3    ALPHAGAN P 0.1 % Solution INSTILL 1 DROP INTO LEFT EYE TWICE A DAY      ascorbic acid (ASCORBIC ACID) 500 MG Tab Take 500 mg by mouth 4 times a day.      Cholecalciferol (VITAMIN D3) 2000 UNIT Cap Take  by mouth every day.      Multiple Vitamins-Minerals (MULTIVITAMIN ADULT) Tab Take  by mouth every day.      B Complex-C (SUPER B COMPLEX PO) Take  by mouth every day.      Calcium Citrate-Vitamin D (CALCIUM CITRATE + D PO) Take  by mouth every day.      Omega-3 Fatty Acids (FISH OIL PO) Take  by mouth every day.       No current [facility]-ordered facility-administered medications on file.       Health Maintenance:     ROS:  Gen: no fevers/chills, no changes in weight  Eyes: no changes in vision  ENT: no sore throat, no hearing loss, no bloody nose  Pulm: no sob, no cough  CV: no chest pain, no palpitations  GI: no nausea/vomiting, no diarrhea  : no dysuria  MSk: no myalgias  Skin: no rash  Neuro: no headaches, no numbness/tingling  Heme/Lymph: no easy bruising      Objective:     Exam:  /82 (BP Location: Left arm, Patient Position: Sitting, BP Cuff Size: Adult)   Pulse 82   Temp 36.4 °C (97.6 °F) (Temporal)   Ht 1.626 m (5' 4\")   Wt 73.8 kg (162 lb 9.6 oz)   SpO2 92%   BMI 27.91 kg/m²  Body mass index is 27.91 kg/m².    Gen: Alert and oriented, No apparent distress.  Neck: Neck is supple without lymphadenopathy.  Lungs: Normal effort, CTA bilaterally, no wheezes, rhonchi, or rales  CV: Regular rate and rhythm. No murmurs, rubs, or gallops.  Ext: No clubbing, cyanosis, edema.          Assessment & Plan:     89 y.o. female with the following -     Problem List Items Addressed This Visit       Confusion     I reviewed Dr. Kay's note and other results.   MRI showed only age-related atrophy and microvascular changes, there is no sign of " infarct.  Zio patch did show a few very brief runs (4 beats) of V. tach similar of SVT.  Carotid doppler with minimal occlusion or plaque build up.   Ana will call the Neurology office for a follow up.  Issues to discuss will include ongoing anticoagulation, review of imaging and when she can return to driving.  Suspect she may need to see cardiology but did not place that referral.  She will check in with me by Marilyn if any difficulties getting follow-up or further questions.  Otherwise I will follow-up in about 3 months.                No follow-ups on file.

## 2024-03-15 ENCOUNTER — OFFICE VISIT (OUTPATIENT)
Dept: CARDIOLOGY | Facility: MEDICAL CENTER | Age: 89
End: 2024-03-15
Attending: PSYCHIATRY & NEUROLOGY
Payer: MEDICARE

## 2024-03-15 VITALS
HEART RATE: 69 BPM | RESPIRATION RATE: 16 BRPM | WEIGHT: 159 LBS | HEIGHT: 64 IN | SYSTOLIC BLOOD PRESSURE: 130 MMHG | DIASTOLIC BLOOD PRESSURE: 70 MMHG | OXYGEN SATURATION: 97 % | BODY MASS INDEX: 27.14 KG/M2

## 2024-03-15 DIAGNOSIS — I65.23 BILATERAL CAROTID ARTERY STENOSIS: ICD-10-CM

## 2024-03-15 DIAGNOSIS — I10 PRIMARY HYPERTENSION: ICD-10-CM

## 2024-03-15 DIAGNOSIS — G45.9 TIA (TRANSIENT ISCHEMIC ATTACK): ICD-10-CM

## 2024-03-15 DIAGNOSIS — R00.0 TACHYCARDIA: ICD-10-CM

## 2024-03-15 DIAGNOSIS — E78.00 PURE HYPERCHOLESTEROLEMIA: ICD-10-CM

## 2024-03-15 LAB — EKG IMPRESSION: NORMAL

## 2024-03-15 PROCEDURE — 3075F SYST BP GE 130 - 139MM HG: CPT | Performed by: INTERNAL MEDICINE

## 2024-03-15 PROCEDURE — 99204 OFFICE O/P NEW MOD 45 MIN: CPT | Performed by: INTERNAL MEDICINE

## 2024-03-15 PROCEDURE — 93005 ELECTROCARDIOGRAM TRACING: CPT | Performed by: INTERNAL MEDICINE

## 2024-03-15 PROCEDURE — 93010 ELECTROCARDIOGRAM REPORT: CPT | Performed by: INTERNAL MEDICINE

## 2024-03-15 PROCEDURE — 99213 OFFICE O/P EST LOW 20 MIN: CPT | Performed by: INTERNAL MEDICINE

## 2024-03-15 PROCEDURE — 3078F DIAST BP <80 MM HG: CPT | Performed by: INTERNAL MEDICINE

## 2024-03-15 ASSESSMENT — FIBROSIS 4 INDEX: FIB4 SCORE: 1.89

## 2024-03-15 ASSESSMENT — ENCOUNTER SYMPTOMS
NAUSEA: 0
SHORTNESS OF BREATH: 0
PALPITATIONS: 0
FOCAL WEAKNESS: 0
DEPRESSION: 0
PSYCHIATRIC NEGATIVE: 1
CONSTITUTIONAL NEGATIVE: 1
MUSCULOSKELETAL NEGATIVE: 1
WEIGHT LOSS: 0
CARDIOVASCULAR NEGATIVE: 1
FEVER: 0
NEUROLOGICAL NEGATIVE: 1
DOUBLE VISION: 0
CHILLS: 0
BRUISES/BLEEDS EASILY: 0
MYALGIAS: 0
COUGH: 0
EYES NEGATIVE: 1
NERVOUS/ANXIOUS: 0
CLAUDICATION: 0
DIZZINESS: 0
RESPIRATORY NEGATIVE: 1
HEADACHES: 0
ABDOMINAL PAIN: 0
WEAKNESS: 0
BLURRED VISION: 0
VOMITING: 0
GASTROINTESTINAL NEGATIVE: 1

## 2024-03-15 NOTE — PROGRESS NOTES
"Chief Complaint   Patient presents with    Hyperlipidemia    Hypertension       Subjective     Camelia Yasmin Frederick is a 89 y.o. female who presents today as a consult from Hector Black for atrial fibrillation.    Thank you for allowing me to evaluate Mrs. Frederick, who as you know is a 89 year old female with hypertension and hyperlipidemia, lifelong nonsmoker, no family history of coronary artery disease. She suffered amaurosis fugax episode in in December. She underwent Zio monitor which was read as atrial fibrillation. She was started on Eliqis. She is clinically doing well. She denies chest pain, shortness of breath, palpitations, nausea/vomiting or diaphoresis. She enjoys reading. She still lives by herself. Her daughters live in [place].      Past Medical History:   Diagnosis Date    Anemia     \"In the past\"    Blood clotting disorder (HCC) 03/09/2018    Left eye 2 years ago.    Breath shortness 03/09/2018    Occasional with exertion    Cataract     bilateral IOL    Cold 03/09/2018    Pt states feels like there may be phlem building up in her throat, feels like \"I might be coming down with something\".  Pt instructed to call Dr. Rasmussen's office if she develops a sore throat, cold and/or fever.  Pt verbalized an understanding.    Dental disorder     lower partial denture    Diabetes     oral medication    High cholesterol     Hypercholesteremia     Hypertension     Hypothyroid     thyroidectomy    Renal disorder 1990's    stones     Past Surgical History:   Procedure Laterality Date    PENETRATING KERATOPLASTY Left 3/13/2018    Procedure: DESCEMETS STRIPPING ENDOTHELIAL KERATOPLASTY;  Surgeon: Guille Rasmussen M.D.;  Location: SURGERY SAME DAY [facility] ORS;  Service: Ophthalmology    PTOSIS REPAIR Bilateral 10/5/2016    Procedure: PTOSIS REPAIR upper lid (levator advanced set up);  Surgeon: Silvino Seay M.D.;  Location: SURGERY SURGICAL [facility] ORS;  Service:     EYE SURGERY Left 2015    Retinal repair " "\"blood clot removed\"    THYROIDECTOMY  1980's    LITHOTRIPSY  1980's    kidney stone    TONSILLECTOMY  1944    CATARACT EXTRACTION WITH IOL Bilateral 2015/2016     History reviewed. No pertinent family history.  Social History     Socioeconomic History    Marital status: Single     Spouse name: Not on file    Number of children: Not on file    Years of education: Not on file    Highest education level: Bachelor's degree (e.g., BA, AB, BS)   Occupational History    Not on file   Tobacco Use    Smoking status: Never    Smokeless tobacco: Never   Vaping Use    Vaping Use: Never used   Substance and Sexual Activity    Alcohol use: Yes     Comment: 4-5 per week    Drug use: No    Sexual activity: Not on file   Other Topics Concern    Not on file   Social History Narrative    Not on file     Social Determinants of Health     Financial Resource Strain: Low Risk  (2/21/2023)    Overall Financial Resource Strain (CARDIA)     Difficulty of Paying Living Expenses: Not hard at all   Food Insecurity: No Food Insecurity (2/21/2023)    Hunger Vital Sign     Worried About Running Out of Food in the Last Year: Never true     Ran Out of Food in the Last Year: Never true   Transportation Needs: No Transportation Needs (2/21/2023)    PRAPARE - Transportation     Lack of Transportation (Medical): No     Lack of Transportation (Non-Medical): No   Physical Activity: Inactive (2/21/2023)    Exercise Vital Sign     Days of Exercise per Week: 0 days     Minutes of Exercise per Session: 0 min   Stress: No Stress Concern Present (2/21/2023)    [employer] - Occupational Stress Questionnaire     Feeling of Stress : Not at all   Social Connections: Moderately Isolated (2/21/2023)    Social Connection and Isolation Panel [NHANES]     Frequency of Communication with Friends and Family: More than three times a week     Frequency of Social Gatherings with Friends and Family: Twice a week     Attends Nondenominational Services: " Never     Active Member of Clubs or Organizations: Yes     Attends Club or Organization Meetings: More than 4 times per year     Marital Status:    Intimate Partner Violence: Not on file   Housing Stability: Low Risk  (2/21/2023)    Housing Stability Vital Sign     Unable to Pay for Housing in the Last Year: No     Number of Places Lived in the Last Year: 1     Unstable Housing in the Last Year: No     Allergies   Allergen Reactions    Codeine Nausea    Nickel Rash     Rash     (Medications reviewed.)  Outpatient Encounter Medications as of 3/15/2024   Medication Sig Dispense Refill    valsartan-hydrochlorothiazide (DIOVAN-HCT) 160-25 MG per tablet TAKE 1 TABLET BY MOUTH EVERY DAY 90 Tablet 3    apixaban (ELIQUIS) 5mg Tab Take 1 Tablet by mouth 2 times a day. 180 Tablet 2    atenolol (TENORMIN) 50 MG Tab TAKE 1 TABLET BY MOUTH EVERY DAY 90 Tablet 3    amLODIPine (NORVASC) 5 MG Tab Take 1 Tablet by mouth every day. 90 Tablet 3    levothyroxine (SYNTHROID) 137 MCG Tab TAKE 1 TABLET BY MOUTH EVERY MORNING ON AN EMPTY STOMACH FOR 90 DAYS. 90 Tablet 3    metFORMIN (GLUCOPHAGE) 500 MG Tab Take 1 Tablet by mouth every day. 90 Tablet 3    simvastatin (ZOCOR) 20 MG Tab TAKE 1 TABLET BY MOUTH EVERY EVENING 90 Tablet 3    ALPHAGAN P 0.1 % Solution INSTILL 1 DROP INTO LEFT EYE TWICE A DAY      ascorbic acid (ASCORBIC ACID) 500 MG Tab Take 500 mg by mouth 4 times a day.      Cholecalciferol (VITAMIN D3) 2000 UNIT Cap Take  by mouth every day.      Multiple Vitamins-Minerals (MULTIVITAMIN ADULT) Tab Take  by mouth every day.      B Complex-C (SUPER B COMPLEX PO) Take  by mouth every day.      Calcium Citrate-Vitamin D (CALCIUM CITRATE + D PO) Take  by mouth every day.      Omega-3 Fatty Acids (FISH OIL PO) Take  by mouth every day.       No facility-administered encounter medications on file as of 3/15/2024.     Review of Systems   Constitutional: Negative.  Negative for chills, fever, malaise/fatigue and weight loss.  "  HENT: Negative.  Negative for hearing loss.    Eyes: Negative.  Negative for blurred vision and double vision.   Respiratory: Negative.  Negative for cough and shortness of breath.    Cardiovascular: Negative.  Negative for chest pain, palpitations, claudication and leg swelling.   Gastrointestinal: Negative.  Negative for abdominal pain, nausea and vomiting.   Genitourinary: Negative.  Negative for dysuria and urgency.   Musculoskeletal: Negative.  Negative for joint pain and myalgias.   Skin: Negative.  Negative for itching and rash.   Neurological: Negative.  Negative for dizziness, focal weakness, weakness and headaches.   Endo/Heme/Allergies: Negative.  Does not bruise/bleed easily.   Psychiatric/Behavioral: Negative.  Negative for depression. The patient is not nervous/anxious.               Objective     /70 (BP Location: Left arm, Patient Position: Sitting, BP Cuff Size: Adult)   Pulse 69   Resp 16   Ht 1.626 m (5' 4\")   Wt 72.1 kg (159 lb)   SpO2 97%   BMI 27.29 kg/m²     Physical Exam  Constitutional:       Appearance: Normal appearance. She is well-developed and normal weight.   HENT:      Head: Normocephalic and atraumatic.   Neck:      Vascular: No JVD.   Cardiovascular:      Rate and Rhythm: Normal rate and regular rhythm.      Heart sounds: Normal heart sounds.   Pulmonary:      Effort: Pulmonary effort is normal.      Breath sounds: Normal breath sounds.   Abdominal:      General: Bowel sounds are normal.      Palpations: Abdomen is soft.      Comments: No hepatosplenomegaly.   Musculoskeletal:         General: Normal range of motion.   Lymphadenopathy:      Cervical: No cervical adenopathy.   Skin:     General: Skin is warm and dry.   Neurological:      Mental Status: She is alert and oriented to person, place, and time.            CARDIAC STUDIES/PROCEDURES:    CAROTID ULTRASOUND (12/14/23)  1.  There is a moderate amount of atherosclerotic plaque.  Plaque is located in carotid bulbs " and proximal internal carotid arteries.  Plaque characterization:  calcific, irregular  2. There is a left internal carotid artery hemodynamically significant stenosis with diameter reduction measuring 50-69%. The right internal carotid artery demonstrates atherosclerosis less than 50% diameter reduction. There is no evidence of carotid occlusion.  3.  Vertebral arteries demonstrate antegrade flow.  (study result reviewed)     EKG was ordered for hypertension, performed on (03/15/24) was reviewed: EKG, personally interpreted shows sinus rhythm.     Laboratory results of (12/06/23) were reviewed. Cholesterol profile of 129/183/53/39 mg/dL noted.    ZIO REPORT  Zio patch monitor   Indications for the procedure 89-year-old female being evaluated for history of TIA   Total monitored duration 14 days being monitored from 11/29/2023 until 12/13/2023.   Findings: Predominantly sinus rhythm during examination with average heart rate of 70 bpm and minimum heart rate of 55 bpm maximal heart rate of 174 bpm.  There were no patient triggered or diary entries.  Noted 2 episodes of wide-complex tachycardia consisting of atrial fibrillation with aberrancy, asymptomatic.  A total of 130 supraventricular tachycardia events occurred with the fastest lasting 17 beats at a rate of 167 bpm and the longest being 26 seconds with an average rate of 126 bpm representing paroxysmal atrial fibrillation.  No high degree AV block or sinus pauses.   Conclusions   1. Predominant sinus rhythm during examination   2.  Asymptomatic paroxysmal atrial fibrillation with intermittent rapid ventricular rate and noted aberrancy   3.  No triggered or diary entries   4.  Rare PVCs     Assessment & Plan     1. Tachycardia        2. Primary hypertension  EKG      3. Pure hypercholesterolemia        4. Bilateral carotid artery stenosis        5. TIA (transient ischemic attack)            Medical Decision Making: Today's Assessment/Status/Plan:         Unspecified tachycardia: She underwent Zio monitor for transient ischemic attack which was interpreted as atrial fibrillation. We will refer to electrophysiology service for second opinion.   Hypertension: Blood pressure is well controlled. We will continue with amlodipine, atenolol, valsartan-hydrochlorothiazide.  Hyperlipidemia: She is doing well on statin therapy without myalgia symptoms.   Carotid artery stenosis: Clinically stable on above medical therapy.  History of trans-ischemic attack: She remains clinically stable without any new neurological symptoms. We will perform an echocardiogram.    We will follow up in 3 months.    Thank you for this consult.    CC Kevin Emerson

## 2024-03-18 ENCOUNTER — HOSPITAL ENCOUNTER (OUTPATIENT)
Dept: CARDIOLOGY | Facility: MEDICAL CENTER | Age: 89
End: 2024-03-18
Attending: INTERNAL MEDICINE
Payer: MEDICARE

## 2024-03-18 DIAGNOSIS — G45.9 TIA (TRANSIENT ISCHEMIC ATTACK): ICD-10-CM

## 2024-03-18 LAB — LV EJECT FRACT  99904: 75

## 2024-03-18 PROCEDURE — 93306 TTE W/DOPPLER COMPLETE: CPT

## 2024-03-18 PROCEDURE — 93306 TTE W/DOPPLER COMPLETE: CPT | Mod: 26 | Performed by: INTERNAL MEDICINE

## 2024-03-18 PROCEDURE — 700117 HCHG RX CONTRAST REV CODE 255: Performed by: INTERNAL MEDICINE

## 2024-03-18 RX ADMIN — HUMAN ALBUMIN MICROSPHERES AND PERFLUTREN 3 ML: 10; .22 INJECTION, SOLUTION INTRAVENOUS at 15:59

## 2024-04-08 ENCOUNTER — OFFICE VISIT (OUTPATIENT)
Dept: CARDIOLOGY | Facility: MEDICAL CENTER | Age: 89
End: 2024-04-08
Attending: INTERNAL MEDICINE
Payer: MEDICARE

## 2024-04-08 VITALS
DIASTOLIC BLOOD PRESSURE: 68 MMHG | WEIGHT: 170 LBS | HEIGHT: 64 IN | HEART RATE: 74 BPM | OXYGEN SATURATION: 92 % | BODY MASS INDEX: 29.02 KG/M2 | SYSTOLIC BLOOD PRESSURE: 116 MMHG | RESPIRATION RATE: 16 BRPM

## 2024-04-08 DIAGNOSIS — R00.0 TACHYCARDIA: ICD-10-CM

## 2024-04-08 DIAGNOSIS — I48.0 PAROXYSMAL ATRIAL FIBRILLATION (HCC): ICD-10-CM

## 2024-04-08 DIAGNOSIS — I10 PRIMARY HYPERTENSION: ICD-10-CM

## 2024-04-08 LAB — EKG IMPRESSION: NORMAL

## 2024-04-08 PROCEDURE — 99213 OFFICE O/P EST LOW 20 MIN: CPT | Performed by: NURSE PRACTITIONER

## 2024-04-08 PROCEDURE — 93005 ELECTROCARDIOGRAM TRACING: CPT | Performed by: NURSE PRACTITIONER

## 2024-04-08 PROCEDURE — 3074F SYST BP LT 130 MM HG: CPT | Performed by: NURSE PRACTITIONER

## 2024-04-08 PROCEDURE — 99214 OFFICE O/P EST MOD 30 MIN: CPT | Mod: 25 | Performed by: NURSE PRACTITIONER

## 2024-04-08 PROCEDURE — 93010 ELECTROCARDIOGRAM REPORT: CPT | Performed by: NURSE PRACTITIONER

## 2024-04-08 PROCEDURE — 3078F DIAST BP <80 MM HG: CPT | Performed by: NURSE PRACTITIONER

## 2024-04-08 ASSESSMENT — FIBROSIS 4 INDEX: FIB4 SCORE: 1.89

## 2024-04-08 ASSESSMENT — ENCOUNTER SYMPTOMS
MUSCULOSKELETAL NEGATIVE: 1
SENSORY CHANGE: 0
GASTROINTESTINAL NEGATIVE: 1
PND: 0
BRUISES/BLEEDS EASILY: 0
RESPIRATORY NEGATIVE: 1
CLAUDICATION: 0
NAUSEA: 0
NEUROLOGICAL NEGATIVE: 1
SHORTNESS OF BREATH: 0
EYES NEGATIVE: 1
ORTHOPNEA: 0
PALPITATIONS: 0
DEPRESSION: 0
NERVOUS/ANXIOUS: 0
DIZZINESS: 0
CONSTITUTIONAL NEGATIVE: 1
WHEEZING: 0
HALLUCINATIONS: 0

## 2024-04-08 NOTE — ASSESSMENT & PLAN NOTE
- BP well controlled today   - continue current regimen of Diovan, amlodipine and secondary benefits of atenolol

## 2024-04-08 NOTE — ASSESSMENT & PLAN NOTE
- continue Eliquis 5mg twice daily for stroke protection   - no bleeding issues   - patient kidneys working well last GFR 84   - The patients [id number] score is 6. HAS-BLED score is 2   - continue atenolol for rate control   - did discuss antiarrhythmic medications; will hold off starting today   - discussed signs and symptoms that may be indication of progression of disease and therefore may need progression of treatment methods   -

## 2024-04-08 NOTE — PROGRESS NOTES
"Atrial Fibrillation Clinic New Consult Note    DOS: 4/8/2024  [id number]  Camelia Frederick    Chief complaint/Reason for consult: paroxysmal atrial fibrillation     HPI: Pt is a 89 y.o. female who presents to the clinic today in consultation for atrial fibrillation. Patient has a past medical history significant for but not limited to: acquired hypothyroidism, type 2 diabetes, paroxysmal atrial fibrillation, hyperlipidemia, hypertension, h/o tension headaches, h/o TIA. Patient doing well today. Here to discuss atrial fibrillation and long term treatment strategies. Currently she is using rate control via atenolol and stroke protection via OAC (Eliquis). She is doing well. Recent monitor showed some irregular wide complex tachycardia episodes consistent with atrial fibrillation irregularities. She also has some mild left atrial dilation consistent with atrial fibrillation. She has wale doing well since and in normal sinus on EKG. Discussed possibility of antiarrhythmic medication dn cardioversion in future should her AF ultimately progress and become symptomatic. BP well controlled today in office.       Past Medical History:   Diagnosis Date    Anemia     \"In the past\"    Blood clotting disorder (HCC) 03/09/2018    Left eye 2 years ago.    Breath shortness 03/09/2018    Occasional with exertion    Cataract     bilateral IOL    Cold 03/09/2018    Pt states feels like there may be phlem building up in her throat, feels like \"I might be coming down with something\".  Pt instructed to call Dr. Rasmussen's office if she develops a sore throat, cold and/or fever.  Pt verbalized an understanding.    Dental disorder     lower partial denture    Diabetes     oral medication    High cholesterol     Hypercholesteremia     Hypertension     Hypothyroid     thyroidectomy    Renal disorder 1990's    stones       Past Surgical History:   Procedure Laterality Date    PENETRATING KERATOPLASTY Left 3/13/2018    Procedure: DESCEMETS " "STRIPPING ENDOTHELIAL KERATOPLASTY;  Surgeon: Guille Rasmussen M.D.;  Location: SURGERY SAME DAY [facility] ORS;  Service: Ophthalmology    PTOSIS REPAIR Bilateral 10/5/2016    Procedure: PTOSIS REPAIR upper lid (levator advanced set up);  Surgeon: Silvino Seay M.D.;  Location: SURGERY SURGICAL [facility] ORS;  Service:     EYE SURGERY Left 2015    Retinal repair \"blood clot removed\"    THYROIDECTOMY  1980's    LITHOTRIPSY  1980's    kidney stone    TONSILLECTOMY  1944    CATARACT EXTRACTION WITH IOL Bilateral 2015/2016       Social History     Socioeconomic History    Marital status: Single     Spouse name: Not on file    Number of children: Not on file    Years of education: Not on file    Highest education level: Bachelor's degree (e.g., BA, AB, BS)   Occupational History    Not on file   Tobacco Use    Smoking status: Never    Smokeless tobacco: Never   Vaping Use    Vaping Use: Never used   Substance and Sexual Activity    Alcohol use: Yes     Comment: 4-5 per week    Drug use: No    Sexual activity: Not on file   Other Topics Concern    Not on file   Social History Narrative    Not on file     Social Determinants of Health     Financial Resource Strain: Low Risk  (2/21/2023)    Overall Financial Resource Strain (CARDIA)     Difficulty of Paying Living Expenses: Not hard at all   Food Insecurity: No Food Insecurity (2/21/2023)    Hunger Vital Sign     Worried About Running Out of Food in the Last Year: Never true     Ran Out of Food in the Last Year: Never true   Transportation Needs: No Transportation Needs (2/21/2023)    PRAPARE - Transportation     Lack of Transportation (Medical): No     Lack of Transportation (Non-Medical): No   Physical Activity: Inactive (2/21/2023)    Exercise Vital Sign     Days of Exercise per Week: 0 days     Minutes of Exercise per Session: 0 min   Stress: No Stress Concern Present (2/21/2023)    [employer] - Occupational Stress Questionnaire     Feeling of " Stress : Not at all   Social Connections: Moderately Isolated (2/21/2023)    Social Connection and Isolation Panel [NHANES]     Frequency of Communication with Friends and Family: More than three times a week     Frequency of Social Gatherings with Friends and Family: Twice a week     Attends [employer] Services: Never     Active Member of Clubs or Organizations: Yes     Attends Club or Organization Meetings: More than 4 times per year     Marital Status:    Intimate Partner Violence: Not on file   Housing Stability: Low Risk  (2/21/2023)    Housing Stability Vital Sign     Unable to Pay for Housing in the Last Year: No     Number of Places Lived in the Last Year: 1     Unstable Housing in the Last Year: No       Family History   Problem Relation Age of Onset    Heart Attack Neg Hx     Heart Disease Neg Hx        Allergies   Allergen Reactions    Codeine Nausea    Nickel Rash     Rash       Current Outpatient Medications   Medication Sig Dispense Refill    valsartan-hydrochlorothiazide (DIOVAN-HCT) 160-25 MG per tablet TAKE 1 TABLET BY MOUTH EVERY DAY 90 Tablet 3    apixaban (ELIQUIS) 5mg Tab Take 1 Tablet by mouth 2 times a day. 180 Tablet 2    atenolol (TENORMIN) 50 MG Tab TAKE 1 TABLET BY MOUTH EVERY DAY 90 Tablet 3    amLODIPine (NORVASC) 5 MG Tab Take 1 Tablet by mouth every day. 90 Tablet 3    levothyroxine (SYNTHROID) 137 MCG Tab TAKE 1 TABLET BY MOUTH EVERY MORNING ON AN EMPTY STOMACH FOR 90 DAYS. 90 Tablet 3    metFORMIN (GLUCOPHAGE) 500 MG Tab Take 1 Tablet by mouth every day. 90 Tablet 3    simvastatin (ZOCOR) 20 MG Tab TAKE 1 TABLET BY MOUTH EVERY EVENING 90 Tablet 3    ALPHAGAN P 0.1 % Solution INSTILL 1 DROP INTO LEFT EYE TWICE A DAY      ascorbic acid (ASCORBIC ACID) 500 MG Tab Take 500 mg by mouth 4 times a day.      Cholecalciferol (VITAMIN D3) 2000 UNIT Cap Take  by mouth every day.      Multiple Vitamins-Minerals (MULTIVITAMIN ADULT) Tab Take  by mouth every day.      B Complex-C (SUPER B  COMPLEX PO) Take  by mouth every day.      Calcium Citrate-Vitamin D (CALCIUM CITRATE + D PO) Take  by mouth every day.      Omega-3 Fatty Acids (FISH OIL PO) Take  by mouth every day.       No current facility-administered medications for this visit.       Vitals:    04/08/24 0851   BP: 116/68   Pulse: 74   Resp: 16   SpO2: 92%         Review of Systems   Constitutional: Negative.  Negative for malaise/fatigue.   HENT: Negative.     Eyes: Negative.    Respiratory: Negative.  Negative for shortness of breath and wheezing.    Cardiovascular:  Negative for chest pain, palpitations, orthopnea, claudication, leg swelling and PND.   Gastrointestinal: Negative.  Negative for nausea.   Genitourinary: Negative.    Musculoskeletal: Negative.    Skin: Negative.    Neurological: Negative.  Negative for dizziness and sensory change.   Endo/Heme/Allergies: Negative.  Does not bruise/bleed easily.   Psychiatric/Behavioral:  Negative for depression and hallucinations. The patient is not nervous/anxious.         Prior echo results reviewed: LVEF 75% with mild left atrial dilation         EKG interpreted by me: Sinus    Physical Exam  Constitutional:       Appearance: Normal appearance.   HENT:      Head: Normocephalic.   Eyes:      Pupils: Pupils are equal, round, and reactive to light.   Neck:      Vascular: No JVD.   Cardiovascular:      Rate and Rhythm: Normal rate and regular rhythm.      Pulses: Normal pulses.      Heart sounds: Normal heart sounds.   Pulmonary:      Effort: Pulmonary effort is normal.      Breath sounds: Normal breath sounds.   Abdominal:      General: Abdomen is flat.      Palpations: Abdomen is soft.   Musculoskeletal:      Cervical back: Normal range of motion.      Right lower leg: No edema.      Left lower leg: No edema.   Skin:     General: Skin is warm and dry.   Neurological:      Mental Status: She is alert and oriented to person, place, and time.   Psychiatric:         Mood and Affect: Mood normal.   "       Behavior: Behavior normal.          Data:  Lipids:   Lab Results   Component Value Date/Time    CHOLSTRLTOT 129 12/06/2023 04:48 PM    TRIGLYCERIDE 183 (H) 12/06/2023 04:48 PM    HDL 53 12/06/2023 04:48 PM    LDL 39 12/06/2023 04:48 PM        BMP:  Lab Results   Component Value Date/Time    SODIUM 141 12/06/2023 1648    POTASSIUM 3.8 12/06/2023 1648    CHLORIDE 102 12/06/2023 1648    CO2 27 12/06/2023 1648    GLUCOSE 137 (H) 12/06/2023 1648    BUN 28 (H) 12/06/2023 1648    CREATININE 0.62 12/06/2023 1648    CALCIUM 9.3 12/06/2023 1648    ANION 12.0 12/06/2023 1648       GFR:  Lab Results   Component Value Date/Time    IFAFRICA >60 03/09/2018 1408    IFNOTAFR >60 04/13/2022 0531        TSH:   Lab Results   Component Value Date/Time    TSHULTRASEN 1.160 12/06/2023 1648       MAGNESIUM:  No results found for: \"MAGNESIUM\"     THYROXINE (T4):   Lab Results   Component Value Date/Time    FREEDIR 1.45 10/13/2022 0651        CBC:   Lab Results   Component Value Date/Time    WBC 8.0 12/06/2023 04:48 PM    RBC 4.71 12/06/2023 04:48 PM    HEMOGLOBIN 15.1 12/06/2023 04:48 PM    HEMATOCRIT 44.5 12/06/2023 04:48 PM    MCV 94.5 12/06/2023 04:48 PM    MCH 32.1 12/06/2023 04:48 PM    MCHC 33.9 12/06/2023 04:48 PM    RDW 43.6 12/06/2023 04:48 PM    PLATELETCT 253 12/06/2023 04:48 PM    MPV 11.6 12/06/2023 04:48 PM    NEUTSPOLYS 48.10 12/06/2023 04:48 PM    LYMPHOCYTES 41.40 (H) 12/06/2023 04:48 PM    MONOCYTES 8.50 12/06/2023 04:48 PM    EOSINOPHILS 0.90 12/06/2023 04:48 PM    BASOPHILS 1.00 12/06/2023 04:48 PM    IMMGRAN 0.10 12/06/2023 04:48 PM    IMMGRAN 0 10/13/2022 06:51 AM    NRBC 0.00 12/06/2023 04:48 PM    NEUTS 3.85 12/06/2023 04:48 PM    NEUTS 3.9 10/13/2022 06:51 AM    LYMPHS 3.32 12/06/2023 04:48 PM    LYMPHS 3.7 (H) 10/13/2022 06:51 AM    MONOS 0.68 12/06/2023 04:48 PM    MONOS 0.5 10/13/2022 06:51 AM    EOS 0.07 12/06/2023 04:48 PM    EOS 0.1 10/13/2022 06:51 AM    BASO 0.08 12/06/2023 04:48 PM    BASO 0.1 " "10/13/2022 06:51 AM    IMMGRANAB 0.01 12/06/2023 04:48 PM    IMMGRANAB 0.0 10/13/2022 06:51 AM    NRBCAB 0.00 12/06/2023 04:48 PM        CBC w/o DIFF  Lab Results   Component Value Date/Time    WBC 8.0 12/06/2023 04:48 PM    RBC 4.71 12/06/2023 04:48 PM    HEMOGLOBIN 15.1 12/06/2023 04:48 PM    MCV 94.5 12/06/2023 04:48 PM    MCH 32.1 12/06/2023 04:48 PM    MCHC 33.9 12/06/2023 04:48 PM    RDW 43.6 12/06/2023 04:48 PM    MPV 11.6 12/06/2023 04:48 PM       LIVER:  Lab Results   Component Value Date/Time    ALKPHOSPHAT 56 12/06/2023 04:48 PM    ASTSGOT 21 12/06/2023 04:48 PM    ALTSGPT 16 12/06/2023 04:48 PM    TBILIRUBIN 0.3 12/06/2023 04:48 PM       BNP:  No results found for: \"BNPBTYPENAT\"    PT/INR:  No results found for: \"PROTHROMBTM\", \"INR\"          Impression/Plan:  Paroxysmal atrial fibrillation (HCC)   - continue Eliquis 5mg twice daily for stroke protection   - no bleeding issues   - patient kidneys working well last GFR 84   - The patients [id number] score is 6. HAS-BLED score is 2   - continue atenolol for rate control   - did discuss antiarrhythmic medications; will hold off starting today   - discussed signs and symptoms that may be indication of progression of disease and therefore may need progression of treatment methods   -       Primary hypertension   - BP well controlled today   - continue current regimen of Diovan, amlodipine and secondary benefits of atenolol        Lifestyle Modification for better heart health care as well as beneficial for prevention of worsening the atrial arrhythmia progression. Lifestyle modification discussed includes diet and reduction of sodium. Patients should eat more of a Mediterranean diet and be very careful with how much processed and fast food they eat. Excessive sodium intake can result in fluid retention which can add additional strain to patients cardiac electrical system. Weight loss can also help long term with cardiac health. For patients with BMI above " 25kg/m2, studies have shown a greater chance of progression of disease as well as recurrence after interventions. ARREST-AF study showed less recurrence of AF and slower disease progression in patients with BMI below 27kg/m2. Smoking and vaping should be stopped. Moderation on caffeine and alcohol. Excessive alcohol or caffeine can result in reactions and antagonize the hearts electrical system. Patient that fit the matrix for sleep apnea should be referred for a formal study and should try to be compliant with treatment for sleep apnea. Stay hydrated and stay active. Studies have shown that staying physically active can help heart health. The CARDIO-FIT study showed sedentary individuals lead to faster time of recurrence of arrhythmia. Exercise goals should be trying to maintain 120-200 minutes per week of exercise.      A total of 35 minutes of time was spent on day of encounter reviewing medical record, performing history and examination, counseling, ordering medication/test/consults, collaborating with referring service, and documentation.    Johnson Diop AGACNP-EP  Cardiac Electrophysiology

## 2024-04-09 ENCOUNTER — APPOINTMENT (RX ONLY)
Dept: URBAN - METROPOLITAN AREA CLINIC 6 | Facility: CLINIC | Age: 89
Setting detail: DERMATOLOGY
End: 2024-04-09

## 2024-04-09 DIAGNOSIS — L82.1 OTHER SEBORRHEIC KERATOSIS: ICD-10-CM

## 2024-04-09 DIAGNOSIS — L81.4 OTHER MELANIN HYPERPIGMENTATION: ICD-10-CM

## 2024-04-09 DIAGNOSIS — Z71.89 OTHER SPECIFIED COUNSELING: ICD-10-CM

## 2024-04-09 DIAGNOSIS — D18.0 HEMANGIOMA: ICD-10-CM

## 2024-04-09 DIAGNOSIS — Z85.828 PERSONAL HISTORY OF OTHER MALIGNANT NEOPLASM OF SKIN: ICD-10-CM | Status: STABLE

## 2024-04-09 DIAGNOSIS — D22 MELANOCYTIC NEVI: ICD-10-CM

## 2024-04-09 DIAGNOSIS — L85.3 XEROSIS CUTIS: ICD-10-CM

## 2024-04-09 PROBLEM — D18.01 HEMANGIOMA OF SKIN AND SUBCUTANEOUS TISSUE: Status: ACTIVE | Noted: 2024-04-09

## 2024-04-09 PROBLEM — D22.5 MELANOCYTIC NEVI OF TRUNK: Status: ACTIVE | Noted: 2024-04-09

## 2024-04-09 PROCEDURE — ? COUNSELING

## 2024-04-09 PROCEDURE — 99213 OFFICE O/P EST LOW 20 MIN: CPT

## 2024-04-09 PROCEDURE — ? SUNSCREEN TREATMENT REGIMEN

## 2024-04-09 PROCEDURE — ? TREATMENT REGIMEN

## 2024-04-09 ASSESSMENT — LOCATION SIMPLE DESCRIPTION DERM
LOCATION SIMPLE: UPPER BACK
LOCATION SIMPLE: LEFT HAND
LOCATION SIMPLE: RIGHT HAND
LOCATION SIMPLE: LEFT FOREHEAD
LOCATION SIMPLE: NOSE
LOCATION SIMPLE: POSTERIOR NECK
LOCATION SIMPLE: CHEST
LOCATION SIMPLE: ABDOMEN

## 2024-04-09 ASSESSMENT — LOCATION DETAILED DESCRIPTION DERM
LOCATION DETAILED: RIGHT MEDIAL TRAPEZIAL NECK
LOCATION DETAILED: EPIGASTRIC SKIN
LOCATION DETAILED: PERIUMBILICAL SKIN
LOCATION DETAILED: NASAL SUPRATIP
LOCATION DETAILED: RIGHT RADIAL DORSAL HAND
LOCATION DETAILED: INFERIOR THORACIC SPINE
LOCATION DETAILED: LEFT ULNAR DORSAL HAND
LOCATION DETAILED: LEFT INFERIOR FOREHEAD
LOCATION DETAILED: MIDDLE STERNUM

## 2024-04-09 ASSESSMENT — LOCATION ZONE DERM
LOCATION ZONE: TRUNK
LOCATION ZONE: NOSE
LOCATION ZONE: FACE
LOCATION ZONE: HAND
LOCATION ZONE: NECK

## 2024-05-01 NOTE — TELEPHONE ENCOUNTER
Received request via: Pharmacy    Was the patient seen in the last year in this department? Yes    Does the patient have an active prescription (recently filled or refills available) for medication(s) requested? No    Pharmacy Name: [facility]    Does the patient have FDC Plus and need 100 day supply (blood pressure, diabetes and cholesterol meds only)? Patient does not have SCP

## 2024-05-05 ENCOUNTER — APPOINTMENT (OUTPATIENT)
Dept: RADIOLOGY | Facility: MEDICAL CENTER | Age: 89
End: 2024-05-05
Attending: EMERGENCY MEDICINE
Payer: MEDICARE

## 2024-05-05 ENCOUNTER — HOSPITAL ENCOUNTER (EMERGENCY)
Facility: MEDICAL CENTER | Age: 89
End: 2024-05-05
Attending: EMERGENCY MEDICINE
Payer: MEDICARE

## 2024-05-05 VITALS
RESPIRATION RATE: 18 BRPM | WEIGHT: 170 LBS | HEART RATE: 74 BPM | DIASTOLIC BLOOD PRESSURE: 96 MMHG | OXYGEN SATURATION: 97 % | BODY MASS INDEX: 29.18 KG/M2 | SYSTOLIC BLOOD PRESSURE: 155 MMHG | TEMPERATURE: 97.4 F

## 2024-05-05 DIAGNOSIS — R42 DIZZINESS: ICD-10-CM

## 2024-05-05 LAB
ANION GAP SERPL CALC-SCNC: 10 MMOL/L (ref 7–16)
APPEARANCE UR: CLEAR
BASOPHILS # BLD AUTO: 0.9 % (ref 0–1.8)
BASOPHILS # BLD: 0.06 K/UL (ref 0–0.12)
BILIRUB UR QL STRIP.AUTO: NEGATIVE
BUN SERPL-MCNC: 23 MG/DL (ref 8–22)
CALCIUM SERPL-MCNC: 9.2 MG/DL (ref 8.5–10.5)
CHLORIDE SERPL-SCNC: 102 MMOL/L (ref 96–112)
CO2 SERPL-SCNC: 29 MMOL/L (ref 20–33)
COLOR UR: YELLOW
CREAT SERPL-MCNC: 0.59 MG/DL (ref 0.5–1.4)
EKG IMPRESSION: NORMAL
EOSINOPHIL # BLD AUTO: 0.06 K/UL (ref 0–0.51)
EOSINOPHIL NFR BLD: 0.9 % (ref 0–6.9)
ERYTHROCYTE [DISTWIDTH] IN BLOOD BY AUTOMATED COUNT: 44.1 FL (ref 35.9–50)
GFR SERPLBLD CREATININE-BSD FMLA CKD-EPI: 85 ML/MIN/1.73 M 2
GLUCOSE SERPL-MCNC: 146 MG/DL (ref 65–99)
GLUCOSE UR STRIP.AUTO-MCNC: NEGATIVE MG/DL
HCT VFR BLD AUTO: 44.5 % (ref 37–47)
HGB BLD-MCNC: 15.2 G/DL (ref 12–16)
IMM GRANULOCYTES # BLD AUTO: 0.02 K/UL (ref 0–0.11)
IMM GRANULOCYTES NFR BLD AUTO: 0.3 % (ref 0–0.9)
KETONES UR STRIP.AUTO-MCNC: NEGATIVE MG/DL
LEUKOCYTE ESTERASE UR QL STRIP.AUTO: NEGATIVE
LYMPHOCYTES # BLD AUTO: 2.11 K/UL (ref 1–4.8)
LYMPHOCYTES NFR BLD: 30.8 % (ref 22–41)
MCH RBC QN AUTO: 31.6 PG (ref 27–33)
MCHC RBC AUTO-ENTMCNC: 34.2 G/DL (ref 32.2–35.5)
MCV RBC AUTO: 92.5 FL (ref 81.4–97.8)
MICRO URNS: NORMAL
MONOCYTES # BLD AUTO: 0.53 K/UL (ref 0–0.85)
MONOCYTES NFR BLD AUTO: 7.7 % (ref 0–13.4)
NEUTROPHILS # BLD AUTO: 4.06 K/UL (ref 1.82–7.42)
NEUTROPHILS NFR BLD: 59.4 % (ref 44–72)
NITRITE UR QL STRIP.AUTO: NEGATIVE
NRBC # BLD AUTO: 0 K/UL
NRBC BLD-RTO: 0 /100 WBC (ref 0–0.2)
PH UR STRIP.AUTO: 6.5 [PH] (ref 5–8)
PLATELET # BLD AUTO: 300 K/UL (ref 164–446)
PMV BLD AUTO: 10.5 FL (ref 9–12.9)
POTASSIUM SERPL-SCNC: 3.8 MMOL/L (ref 3.6–5.5)
PROT UR QL STRIP: NEGATIVE MG/DL
RBC # BLD AUTO: 4.81 M/UL (ref 4.2–5.4)
RBC UR QL AUTO: NEGATIVE
SODIUM SERPL-SCNC: 141 MMOL/L (ref 135–145)
SP GR UR STRIP.AUTO: 1.01
TROPONIN T SERPL-MCNC: 14 NG/L (ref 6–19)
UROBILINOGEN UR STRIP.AUTO-MCNC: 0.2 MG/DL
WBC # BLD AUTO: 6.8 K/UL (ref 4.8–10.8)

## 2024-05-05 ASSESSMENT — FIBROSIS 4 INDEX: FIB4 SCORE: 1.89

## 2024-05-05 NOTE — ED TRIAGE NOTES
Pt to rm red 8 .  Chief Complaint   Patient presents with    Dizziness     Pt c/o sudden onset of dizziness. Denies pain

## 2024-05-05 NOTE — ED PROVIDER NOTES
"ED Provider Note    CHIEF COMPLAINT  Chief Complaint   Patient presents with    Dizziness     Pt c/o sudden onset of dizziness. Denies pain        EXTERNAL RECORDS REVIEWED  Other outpatient notes both from primary care and from cardiology.  Patient has a history of paroxysmal atrial fibrillation she also has history of TIA approximately 1 year prior.    HPI/ROS    LIMITATION TO HISTORY   Select: : None  OUTSIDE HISTORIAN(S):  EMS at time of arrival.  Slight hypoxia and normal fingerstick and round    Camelia Frederick is a 89 y.o. female who presents to the emergency department with chief complaint of \"wooziness\".  Patient had an episode 45 minutes prior to arrival where she states she was ambulating and got very woozy.  She states that she had to brace herself on her kitchen counter at that time to prevent herself from falling.  She did not fall did not hit her head no chest pain shortness of breath.  She initially denied headache at arrival but later reported onset of headache within a few minutes of being here.  Per chart review patient does have a previous history of TIA she also has a previous history of paroxysmal atrial fibrillation for which she is on Eliquis.  She is followed up with cardiology primary care and neurology recently had evaluation with Zio patch and MRI of the brain.  Zio patch did reveal some episodes of paroxysmal atrial fibrillation.  She also had recent clot carotid ultrasound which showed some 50 to 70% stenosis however was felt to be clinically stable on medical management    PAST MEDICAL HISTORY   has a past medical history of Anemia, Blood clotting disorder (HCC) (03/09/2018), Breath shortness (03/09/2018), Cataract, Cold (03/09/2018), Dental disorder, Diabetes, High cholesterol, Hypercholesteremia, Hypertension, Hypothyroid, and Renal disorder (1990's).    SURGICAL HISTORY   has a past surgical history that includes tonsillectomy (1944); cataract extraction with iol (Bilateral, " 2015/2016); eye surgery (Left, 2015); thyroidectomy (1980's); lithotripsy (1980's); ptosis repair (Bilateral, 10/5/2016); and penetrating keratoplasty (Left, 3/13/2018).    FAMILY HISTORY  Family History   Problem Relation Age of Onset    Heart Attack Neg Hx     Heart Disease Neg Hx        SOCIAL HISTORY  Social History     Tobacco Use    Smoking status: Never    Smokeless tobacco: Never   Vaping Use    Vaping Use: Never used   Substance and Sexual Activity    Alcohol use: Yes     Comment: 4-5 per week    Drug use: No    Sexual activity: Not on file       CURRENT MEDICATIONS  Home Medications       Reviewed by Nusrat Irby R.N. (Registered Nurse) on 05/05/24 at 1035  Med List Status: <None>     Medication Last Dose Status   ALPHAGAN P 0.1 % Solution  Active   amLODIPine (NORVASC) 5 MG Tab  Active   apixaban (ELIQUIS) 5mg Tab  Active   ascorbic acid (ASCORBIC ACID) 500 MG Tab  Active   atenolol (TENORMIN) 50 MG Tab  Active   B Complex-C (SUPER B COMPLEX PO)  Active   Calcium Citrate-Vitamin D (CALCIUM CITRATE + D PO)  Active   Cholecalciferol (VITAMIN D3) 2000 UNIT Cap  Active   levothyroxine (SYNTHROID) 137 MCG Tab  Active   metFORMIN (GLUCOPHAGE) 500 MG Tab  Active   Multiple Vitamins-Minerals (MULTIVITAMIN ADULT) Tab  Active   Omega-3 Fatty Acids (FISH OIL PO)  Active   simvastatin (ZOCOR) 20 MG Tab  Active   valsartan-hydrochlorothiazide (DIOVAN-HCT) 160-25 MG per tablet  Active                    ALLERGIES  Allergies   Allergen Reactions    Codeine Nausea    Nickel Rash     Rash       PHYSICAL EXAM  VITAL SIGNS: BP (!) 158/81   Pulse 75   Temp 36.5 °C (97.7 °F) (Temporal)   Resp 16   Wt 77.1 kg (170 lb)   SpO2 99%   BMI 29.18 kg/m²      Pulse ox interpretation: I interpret this pulse ox as normal.  Constitutional: Alert and oriented x 3, no acute distress  HEENT: Atraumatic normocephalic, pupils are equal round reactive to light extraocular movements are intact. The nares is clear, external ears are  normal, mouth shows moist mucous membranes normal dentition for age  Neck: Supple, no JVD no tracheal deviation  Cardiovascular: Regular rate and rhythm no murmur rub or gallop 2+ pulses peripherally x4  Thorax & Lungs: No respiratory distress, no wheezes rales or rhonchi, No chest tenderness.   GI: Soft nontender nondistended positive bowel sounds, no peritoneal signs  Skin: Warm dry no acute rash or lesion  Musculoskeletal: Moving all extremities with full range and 5 of 5 strength no acute  deformity  Neurologic: Cranial nerves III through XII are grossly intact no sensory deficit no cerebellar dysfunction   Psychiatric: Appropriate affect for situation at this time      EKG/LABS  Results for orders placed or performed during the hospital encounter of 05/05/24   CBC WITH DIFFERENTIAL   Result Value Ref Range    WBC 6.8 4.8 - 10.8 K/uL    RBC 4.81 4.20 - 5.40 M/uL    Hemoglobin 15.2 12.0 - 16.0 g/dL    Hematocrit 44.5 37.0 - 47.0 %    MCV 92.5 81.4 - 97.8 fL    MCH 31.6 27.0 - 33.0 pg    MCHC 34.2 32.2 - 35.5 g/dL    RDW 44.1 35.9 - 50.0 fL    Platelet Count 300 164 - 446 K/uL    MPV 10.5 9.0 - 12.9 fL    Neutrophils-Polys 59.40 44.00 - 72.00 %    Lymphocytes 30.80 22.00 - 41.00 %    Monocytes 7.70 0.00 - 13.40 %    Eosinophils 0.90 0.00 - 6.90 %    Basophils 0.90 0.00 - 1.80 %    Immature Granulocytes 0.30 0.00 - 0.90 %    Nucleated RBC 0.00 0.00 - 0.20 /100 WBC    Neutrophils (Absolute) 4.06 1.82 - 7.42 K/uL    Lymphs (Absolute) 2.11 1.00 - 4.80 K/uL    Monos (Absolute) 0.53 0.00 - 0.85 K/uL    Eos (Absolute) 0.06 0.00 - 0.51 K/uL    Baso (Absolute) 0.06 0.00 - 0.12 K/uL    Immature Granulocytes (abs) 0.02 0.00 - 0.11 K/uL    NRBC (Absolute) 0.00 K/uL   BASIC METABOLIC PANEL   Result Value Ref Range    Sodium 141 135 - 145 mmol/L    Potassium 3.8 3.6 - 5.5 mmol/L    Chloride 102 96 - 112 mmol/L    Co2 29 20 - 33 mmol/L    Glucose 146 (H) 65 - 99 mg/dL    Bun 23 (H) 8 - 22 mg/dL    Creatinine 0.59 0.50 - 1.40  mg/dL    Calcium 9.2 8.5 - 10.5 mg/dL    Anion Gap 10.0 7.0 - 16.0   TROPONIN   Result Value Ref Range    Troponin T 14 6 - 19 ng/L   URINALYSIS    Specimen: Urine, Clean Catch   Result Value Ref Range    Color Yellow     Character Clear     Specific Gravity 1.009 <1.035    Ph 6.5 5.0 - 8.0    Glucose Negative Negative mg/dL    Ketones Negative Negative mg/dL    Protein Negative Negative mg/dL    Bilirubin Negative Negative    Urobilinogen, Urine 0.2 Negative    Nitrite Negative Negative    Leukocyte Esterase Negative Negative    Occult Blood Negative Negative    Micro Urine Req see below    ESTIMATED GFR   Result Value Ref Range    GFR (CKD-EPI) 85 >60 mL/min/1.73 m 2   EKG   Result Value Ref Range    Report       [facility] Emergency Dept.    Test Date:  2024  Pt Name:    KEYUR ALBRECHT             Department: ER  MRN:        [id number]                      Room:       [facility]  Gender:     Female                       Technician: [id number]  :        [id number]                   Requested By:LOLITA WOO  Order #:    [id number]                    Reading MD: LOLITA WOO MD    Measurements  Intervals                                Axis  Rate:       69                           P:          60  MN:         172                          QRS:        7  QRSD:       94                           T:          3  QT:         428  QTc:        459    Interpretive Statements  normal sinus rhythm at  69    , no ST elevation no ST depression no T-wave  inversion appropriate R-wave progression normal axis normal intervals no  other ischemic or arrhythmic features  Electronically Signed On 2024 10:50:04 PDT by LOLITA WOO MD        I have independently interpreted this EKG    RADIOLOGY/PROCEDURES   I have independently interpreted the diagnostic imaging associated with this visit and am waiting the final reading from the radiologist.   My preliminary interpretation is as follows:    No focal consolidation    Radiologist interpretation:  CT-HEAD W/O   Final Result      1.  Diffuse atrophy and white matter changes.   2.  No acute intracranial hemorrhage or territorial infarct.            DX-CHEST-PORTABLE (1 VIEW)   Final Result      No acute cardiac or pulmonary abnormalities are identified.          COURSE & MEDICAL DECISION MAKING    ASSESSMENT, COURSE AND PLAN  Care Narrative: Very pleasant 89-year-old female presents with slight episode of dizziness earlier in the day.  Documented history of paroxysmal atrial fibrillation which is my high suspicion at this time.  Patient had negative heart enzymes the rest of her blood work was very reassuring.  Patient is feeling better in the ER and has had no further events here.  Head CT negative chest x-ray negative.  Patient has follow-up appointment with her cardiologist within the next couple of weeks.  She is instructed to maintain that appointment to return here should she have worsening dizziness falls syncopal presyncopal symptoms chest pain palpitation shortness of breath any other acute symptom change or concern she is otherwise discharged in stable and improved condition.          ADDITIONAL PROBLEMS MANAGED      DISPOSITION AND DISCUSSIONS  I have discussed management of the patient with the following physicians and JACQUELINE's:      Discussion of management with other QHP or appropriate source(s):      Escalation of care considered, and ultimately not performed:acute inpatient care management, however at this time, the patient is most appropriate for outpatient management    Barriers to care at this time, including but not limited to: .     Decision tools and prescription drugs considered including, but not limited to: .  BP (!) 155/96   Pulse 74   Temp 36.3 °C (97.4 °F) (Temporal)   Resp 18   Wt 77.1 kg (170 lb)   SpO2 97%   BMI 29.18 kg/m²     Kevin Bryson M.D.  [address] [place] [address]  [phone]          [facility]  [facility], Emergency Dept  [facility] [facility] [id number] [phone]    in 12-24 hours if symptoms persist, immediately If symptoms worsen, or if you develop any other symptoms or concerns      FINAL DIAGNOSIS  1. Dizziness Active          Electronically signed by: Jj Xiao M.D.

## 2024-05-05 NOTE — ED NOTES
Pt discharged, all appropriate hospital equipment removed (IV, monitor, pulse ox, etc.). Pt left unit via waking with staff to lobby for p/u by friend. Personal belongings with pt when leaving unit. Pt given discharge instructions prior to leaving ER, including where to  prescriptions and when to follow-up if applicable; verbalizes understanding. Pt informed to return to ED if symptoms worsen/return or altered status develop. Copy of discharge instructions signed and turned into DC basket and copy sent with pt. F/u with PCP

## 2024-05-08 ENCOUNTER — OFFICE VISIT (OUTPATIENT)
Dept: MEDICAL GROUP | Facility: CLINIC | Age: 89
End: 2024-05-08
Payer: MEDICARE

## 2024-05-08 VITALS
OXYGEN SATURATION: 90 % | BODY MASS INDEX: 28.77 KG/M2 | SYSTOLIC BLOOD PRESSURE: 151 MMHG | TEMPERATURE: 98.4 F | HEART RATE: 81 BPM | DIASTOLIC BLOOD PRESSURE: 83 MMHG | HEIGHT: 64 IN | WEIGHT: 168.5 LBS

## 2024-05-08 DIAGNOSIS — R41.0 CONFUSION: ICD-10-CM

## 2024-05-08 LAB — EKG IMPRESSION: NORMAL

## 2024-05-08 PROCEDURE — 99214 OFFICE O/P EST MOD 30 MIN: CPT | Performed by: FAMILY MEDICINE

## 2024-05-08 PROCEDURE — 3077F SYST BP >= 140 MM HG: CPT | Performed by: FAMILY MEDICINE

## 2024-05-08 PROCEDURE — 3079F DIAST BP 80-89 MM HG: CPT | Performed by: FAMILY MEDICINE

## 2024-05-08 ASSESSMENT — FIBROSIS 4 INDEX: FIB4 SCORE: 1.59

## 2024-05-08 NOTE — PROGRESS NOTES
"Subjective:     CC: f/u ED visit for light headedness.    HPI:   Camelia presents today to discuss the following issues     Problem   Confusion    Ana had another brief episode of \"wooziness\" requiring transport to the emergency room.  Full workup was undertaken which was unremarkable, details below.  Her discharge diagnosis was of likely an atrial fibrillation related event.  She does have a cardiology appointment but not for a month or 2.  She is asymptomatic now and remains on all of her usual medications.    ED note:   Care Narrative: Very pleasant 89-year-old female presents with slight episode of dizziness earlier in the day.  Documented history of paroxysmal atrial fibrillation which is my high suspicion at this time.  Patient had negative heart enzymes the rest of her blood work was very reassuring.  Patient is feeling better in the ER and has had no further events here.  Head CT negative chest x-ray negative.  Patient has follow-up appointment with her cardiologist within the next couple of weeks.  She is instructed to maintain that appointment to return here should she have worsening dizziness falls syncopal presyncopal symptoms chest pain palpitation shortness of breath any other acute symptom change or concern she is otherwise discharged in stable and improved condition.    Prior:       Ana is back for follow-up.  As described below, she had an episode a few months ago with visual changes and then confusion that were concerning for a TIA.  Symptoms were distant at the time of our office visit so I placed her on aspirin, ordered a carotid Doppler and sent her to neurology.  Dr. Kay was able to see her quickly after that and added a Zio patch and MRI of the brain.  He also placed her on Eliquis.  Follow-up was intended to be 6 weeks but that appointment did not happen. It alfonso now been a couple of months.  She did not hear any results of any of these tests.  She has been relatively asymptomatic " "although she did have a brief episode a few weeks ago feeling slightly \"strange\" for a few minutes while at home.  She cannot really be more specific, there was no true confusion or visual loss at that time.    Prior visit:      Ana had an episode a month ago while driving where her vision altered in a way that everything \"looked gray\". This lasted for about 5 blocks and resolved. The next day she was again driving when she became confused as to where she was and which way her home was. This resolved entirely and she has had no similar symptoms since. She denies muscle weakness, other visual change, droop, changes in speech, or headache.          Current Outpatient Medications Ordered in Epic   Medication Sig Dispense Refill    metFORMIN (GLUCOPHAGE) 500 MG Tab Take 1 Tablet by mouth every day. 90 Tablet 3    valsartan-hydrochlorothiazide (DIOVAN-HCT) 160-25 MG per tablet TAKE 1 TABLET BY MOUTH EVERY DAY 90 Tablet 3    apixaban (ELIQUIS) 5mg Tab Take 1 Tablet by mouth 2 times a day. 180 Tablet 2    atenolol (TENORMIN) 50 MG Tab TAKE 1 TABLET BY MOUTH EVERY DAY 90 Tablet 3    amLODIPine (NORVASC) 5 MG Tab Take 1 Tablet by mouth every day. 90 Tablet 3    levothyroxine (SYNTHROID) 137 MCG Tab TAKE 1 TABLET BY MOUTH EVERY MORNING ON AN EMPTY STOMACH FOR 90 DAYS. 90 Tablet 3    simvastatin (ZOCOR) 20 MG Tab TAKE 1 TABLET BY MOUTH EVERY EVENING 90 Tablet 3    ALPHAGAN P 0.1 % Solution INSTILL 1 DROP INTO LEFT EYE TWICE A DAY      ascorbic acid (ASCORBIC ACID) 500 MG Tab Take 500 mg by mouth 4 times a day.      Cholecalciferol (VITAMIN D3) 2000 UNIT Cap Take  by mouth every day.      Multiple Vitamins-Minerals (MULTIVITAMIN ADULT) Tab Take  by mouth every day.      B Complex-C (SUPER B COMPLEX PO) Take  by mouth every day.      Calcium Citrate-Vitamin D (CALCIUM CITRATE + D PO) Take  by mouth every day.      Omega-3 Fatty Acids (FISH OIL PO) Take  by mouth every day.       No current [facility]-ordered facility-administered " "medications on file.       Health Maintenance:     ROS:  Gen: no fevers/chills, no changes in weight  Eyes: no changes in vision  ENT: no sore throat, no hearing loss, no bloody nose  Pulm: no sob, no cough  CV: no chest pain, no palpitations  GI: no nausea/vomiting, no diarrhea  : no dysuria  MSk: no myalgias  Skin: no rash  Neuro: no headaches, no numbness/tingling  Heme/Lymph: no easy bruising      Objective:     Exam:  BP (!) 151/83 (BP Location: Left arm, Patient Position: Sitting, BP Cuff Size: Adult)   Pulse 81   Temp 36.9 °C (98.4 °F) (Temporal)   Ht 1.626 m (5' 4\")   Wt 76.4 kg (168 lb 8 oz)   SpO2 90%   BMI 28.92 kg/m²  Body mass index is 28.92 kg/m².    Gen: Alert and oriented, No apparent distress.  Neck: Neck is supple without lymphadenopathy.  Lungs: Normal effort, CTA bilaterally, no wheezes, rhonchi, or rales  CV: Regular rate and rhythm. No murmurs, rubs, or gallops.  Ext: No clubbing, cyanosis, edema.          Assessment & Plan:     89 y.o. female with the following -     Problem List Items Addressed This Visit       Confusion     I reviewed the ED note along with all the specific findings.  There does seem to be an agreement that no active coronary artery disease or angina, no TIA or stroke.  She does have a history of atrial fibrillation but was unaware of any palpitations or other cardiac related symptoms.  She does remain on a beta-blocker, atenolol and is still on apixaban.  Reviewed further ER precautions and did show her to how to check her own pulse.  Will stand up slowly already taking other necessary precautions, she does not drive.  I would like her to come back and see me in 1 month again sooner if other symptoms or new concerns.  F/u cardiology                No follow-ups on file.            "

## 2024-05-08 NOTE — ASSESSMENT & PLAN NOTE
I reviewed the ED note along with all the specific findings.  There does seem to be an agreement that no active coronary artery disease or angina, no TIA or stroke.  She does have a history of atrial fibrillation but was unaware of any palpitations or other cardiac related symptoms.  She does remain on a beta-blocker, atenolol and is still on apixaban.  Reviewed further ER precautions and did show her to how to check her own pulse.  Will stand up slowly already taking other necessary precautions, she does not drive.  I would like her to come back and see me in 1 month again sooner if other symptoms or new concerns.  F/u cardiology

## 2024-06-14 RX ORDER — LEVOTHYROXINE SODIUM 137 UG/1
TABLET ORAL
Qty: 90 TABLET | Refills: 3 | Status: SHIPPED | OUTPATIENT
Start: 2024-06-14

## 2024-07-22 ENCOUNTER — APPOINTMENT (OUTPATIENT)
Dept: CARDIOLOGY | Facility: MEDICAL CENTER | Age: 89
End: 2024-07-22
Attending: INTERNAL MEDICINE
Payer: MEDICARE

## 2024-08-07 DIAGNOSIS — G45.9 TRANSIENT ISCHEMIC ATTACK (TIA): ICD-10-CM

## 2024-08-07 DIAGNOSIS — I48.0 PAROXYSMAL A-FIB (HCC): ICD-10-CM

## 2024-08-07 NOTE — TELEPHONE ENCOUNTER
Received request via: Patient    Medication Name/Dosage ELIQUIS 5MG      When was medication last prescribed 12/29/23    How many refills were previously provided 3    How many Refills does he patient have left from last prescription 0    Was the patient seen in the last year in this department? Yes   Date of last office visit 03/06/24     Per last Neurology Office Visit, when was the date of next follow up visit set for?                            Date of office visit follow up request PRN     Does the patient have an upcoming appointment? No   If yes, when 0             If no, schedule appointment 0    Does the patient have retirement Plus and need 100 day supply (blood pressure, diabetes and cholesterol meds only)? Patient does not have SCP

## 2024-08-07 NOTE — TELEPHONE ENCOUNTER
Received request via: Patient    Was the patient seen in the last year in this department? Yes    Does the patient have an active prescription (recently filled or refills available) for medication(s) requested? No    [facility]    Does the patient have group home Plus and need 100-day supply? (This applies to ALL medications) Patient does not have SCP

## 2024-08-08 RX ORDER — AMLODIPINE BESYLATE 5 MG/1
5 TABLET ORAL DAILY
Qty: 90 TABLET | Refills: 3 | Status: SHIPPED | OUTPATIENT
Start: 2024-08-08

## 2024-08-08 RX ORDER — ATENOLOL 50 MG/1
50 TABLET ORAL
Qty: 90 TABLET | Refills: 3 | Status: SHIPPED | OUTPATIENT
Start: 2024-08-08

## 2024-08-08 RX ORDER — LEVOTHYROXINE SODIUM 137 UG/1
TABLET ORAL
Qty: 90 TABLET | Refills: 3 | Status: SHIPPED | OUTPATIENT
Start: 2024-08-08

## 2024-08-08 RX ORDER — VALSARTAN AND HYDROCHLOROTHIAZIDE 160; 25 MG/1; MG/1
1 TABLET ORAL DAILY
Qty: 90 TABLET | Refills: 3 | Status: SHIPPED | OUTPATIENT
Start: 2024-08-08

## 2024-08-08 RX ORDER — SIMVASTATIN 20 MG
20 TABLET ORAL EVERY EVENING
Qty: 90 TABLET | Refills: 3 | Status: SHIPPED | OUTPATIENT
Start: 2024-08-08

## 2024-08-20 ENCOUNTER — APPOINTMENT (OUTPATIENT)
Dept: CARDIOLOGY | Facility: MEDICAL CENTER | Age: 89
End: 2024-08-20
Attending: INTERNAL MEDICINE
Payer: MEDICARE

## 2024-09-09 ENCOUNTER — APPOINTMENT (OUTPATIENT)
Dept: MEDICAL GROUP | Facility: CLINIC | Age: 89
End: 2024-09-09
Payer: MEDICARE

## 2024-09-25 ENCOUNTER — OFFICE VISIT (OUTPATIENT)
Dept: MEDICAL GROUP | Facility: CLINIC | Age: 89
End: 2024-09-25
Payer: MEDICARE

## 2024-09-25 VITALS
BODY MASS INDEX: 26.6 KG/M2 | WEIGHT: 155.8 LBS | HEIGHT: 64 IN | TEMPERATURE: 97.1 F | DIASTOLIC BLOOD PRESSURE: 76 MMHG | OXYGEN SATURATION: 90 % | SYSTOLIC BLOOD PRESSURE: 141 MMHG | HEART RATE: 82 BPM

## 2024-09-25 DIAGNOSIS — Z23 NEED FOR VACCINATION: ICD-10-CM

## 2024-09-25 DIAGNOSIS — R41.0 CONFUSION: ICD-10-CM

## 2024-09-25 DIAGNOSIS — I48.0 PAROXYSMAL ATRIAL FIBRILLATION (HCC): ICD-10-CM

## 2024-09-25 DIAGNOSIS — E11.9 TYPE 2 DIABETES MELLITUS WITHOUT COMPLICATION (HCC): ICD-10-CM

## 2024-09-25 DIAGNOSIS — Z00.00 HEALTH CARE MAINTENANCE: ICD-10-CM

## 2024-09-25 LAB
HBA1C MFR BLD: 6.5 % (ref ?–5.8)
POCT INT CON NEG: NEGATIVE
POCT INT CON POS: POSITIVE

## 2024-09-25 PROCEDURE — 90662 IIV NO PRSV INCREASED AG IM: CPT | Performed by: FAMILY MEDICINE

## 2024-09-25 PROCEDURE — 99214 OFFICE O/P EST MOD 30 MIN: CPT | Mod: 25 | Performed by: FAMILY MEDICINE

## 2024-09-25 PROCEDURE — 83036 HEMOGLOBIN GLYCOSYLATED A1C: CPT | Mod: QW | Performed by: FAMILY MEDICINE

## 2024-09-25 PROCEDURE — G0008 ADMIN INFLUENZA VIRUS VAC: HCPCS | Performed by: FAMILY MEDICINE

## 2024-09-25 ASSESSMENT — FIBROSIS 4 INDEX: FIB4 SCORE: 1.61

## 2024-09-25 NOTE — PROGRESS NOTES
"Subjective:     CC: A fib, confusion, HCM, recent URI    HPI:   Camelia presents today to discuss the following issues      Problem   Health Care Maintenance    Ana is UTD on most vaccines, needs flu, COVID booster and had Zostavax.     Paroxysmal Atrial Fibrillation (Hcc)    Stable and followed by cardiology. On Eliquis and tenormin.     Confusion    There has been no recurrence of the confusional episodes detailed below.  She has established with neurology and imaging and other workup has been unremarkable.  She does suffer from atrial fibrillation of relatively new onset that may have contributed.    Prior:     Ana had another brief episode of \"wooziness\" requiring transport to the emergency room.  Full workup was undertaken which was unremarkable, details below.  Her discharge diagnosis was of likely an atrial fibrillation related event.  She does have a cardiology appointment but not for a month or 2.  She is asymptomatic now and remains on all of her usual medications.    ED note:   Care Narrative: Very pleasant 89-year-old female presents with slight episode of dizziness earlier in the day.  Documented history of paroxysmal atrial fibrillation which is my high suspicion at this time.  Patient had negative heart enzymes the rest of her blood work was very reassuring.  Patient is feeling better in the ER and has had no further events here.  Head CT negative chest x-ray negative.  Patient has follow-up appointment with her cardiologist within the next couple of weeks.  She is instructed to maintain that appointment to return here should she have worsening dizziness falls syncopal presyncopal symptoms chest pain palpitation shortness of breath any other acute symptom change or concern she is otherwise discharged in stable and improved condition.     Type 2 Diabetes Mellitus Without Complication (Hcc)    Due for A1C but not full labs. Has enough meds. Had a retinal exam recently.     Prior:    Her last " A1c was 6.6 during a hospital visit 2 months ago.  Full chemistries were done at that time and I will not redraw any now.  She has enough metformin to last until December.         Current Outpatient Medications Ordered in Epic   Medication Sig Dispense Refill    Zoster Vac Recomb Adjuvanted (SHINGRIX) 50 MCG/0.5ML Recon Susp Inject 0.5 mL into the shoulder, thigh, or buttocks one time for 1 dose. 0.5 mL 0    simvastatin (ZOCOR) 20 MG Tab Take 1 Tablet by mouth every evening. 90 Tablet 3    amLODIPine (NORVASC) 5 MG Tab Take 1 Tablet by mouth every day. 90 Tablet 3    atenolol (TENORMIN) 50 MG Tab Take 1 Tablet by mouth every day. 90 Tablet 3    valsartan-hydrochlorothiazide (DIOVAN-HCT) 160-25 MG per tablet Take 1 Tablet by mouth every day. 90 Tablet 3    metFORMIN (GLUCOPHAGE) 500 MG Tab Take 1 Tablet by mouth every day. 90 Tablet 3    levothyroxine (SYNTHROID) 137 MCG Tab TAKE 1 TABLET BY MOUTH EVERY MORNING ON AN EMPTY STOMACH 90 Tablet 3    apixaban (ELIQUIS) 5mg Tab Take 1 Tablet by mouth 2 times a day. 180 Tablet 2    ALPHAGAN P 0.1 % Solution INSTILL 1 DROP INTO LEFT EYE TWICE A DAY      ascorbic acid (ASCORBIC ACID) 500 MG Tab Take 500 mg by mouth 4 times a day.      Cholecalciferol (VITAMIN D3) 2000 UNIT Cap Take  by mouth every day.      Multiple Vitamins-Minerals (MULTIVITAMIN ADULT) Tab Take  by mouth every day.      B Complex-C (SUPER B COMPLEX PO) Take  by mouth every day.      Calcium Citrate-Vitamin D (CALCIUM CITRATE + D PO) Take  by mouth every day.      Omega-3 Fatty Acids (FISH OIL PO) Take  by mouth every day.       No current [facility]-ordered facility-administered medications on file.       Health Maintenance:     ROS:  Gen: no fevers/chills, no changes in weight  Eyes: no changes in vision  ENT: no sore throat, no hearing loss, no bloody nose  Pulm: no sob, no cough  CV: no chest pain, no palpitations  GI: no nausea/vomiting, no diarrhea  : no dysuria  MSk: no myalgias  Skin: no rash  Neuro: no  elbow obtained on 7/5/2018 were extensively reviewed. There is a rounded off density just proximal to the left olecranon which appears to be old . Imaging:    Impression: 1 left elbow contusion #2 left elbow tendinitis/triceps tendinitis/lateral epicondylitis     Plan: At this time, the patient was given a Medrol Dosepak. The patient was encouraged to modify her activities. She will follow-up with me in approximately 2 weeks and we'll reassess her then. If the patient continues to have severe pain, we will consider an MRI scan. The patient was given restrictions with a 10 pound weight limit left upper extremity. "headaches, no numbness/tingling  Heme/Lymph: no easy bruising      Objective:     Exam:  BP (!) 141/76 (BP Location: Left arm, Patient Position: Sitting, BP Cuff Size: Adult)   Pulse 82   Temp 36.2 °C (97.1 °F) (Temporal)   Ht 1.626 m (5' 4\")   Wt 70.7 kg (155 lb 12.8 oz)   SpO2 90%   BMI 26.74 kg/m²  Body mass index is 26.74 kg/m².    Gen: Alert and oriented, No apparent distress.  Neck: Neck is supple without lymphadenopathy.  Lungs: Normal effort, CTA bilaterally, no wheezes, rhonchi, or rales  CV: Regular rate and rhythm. No murmurs, rubs, or gallops.  Ext: No clubbing, cyanosis, edema.          Assessment & Plan:     89 y.o. female with the following -     Problem List Items Addressed This Visit          Family Medicine Problems    Health care maintenance     Flu shot today, ordered Shingrix. Will get that and COVID at pharmacy            Other    Type 2 diabetes mellitus without complication (HCC)     A1C today 6.5  Full labs in July 25, check microfilament next visit.         Relevant Orders    POCT A1C (Completed)    Confusion    Paroxysmal atrial fibrillation (HCC)     RRR today.  F/u cardiology          Other Visit Diagnoses       Need for vaccination        Relevant Medications    Zoster Vac Recomb Adjuvanted (SHINGRIX) 50 MCG/0.5ML Recon Susp    Other Relevant Orders    Influenza Vaccine, High Dose (65+ Only) (Completed)            I spent a total of 33 minutes with record review, exam, communication with the patient, communication with other providers, and documentation of this encounter.      No follow-ups on file.            "

## 2024-10-17 NOTE — TELEPHONE ENCOUNTER
Received request via: Pharmacy    Was the patient seen in the last year in this department? Yes    Does the patient have an active prescription (recently filled or refills available) for medication(s) requested? No    [facility] Name[facility]    Does the patient have [facility] Plus and need 100 day supply (blood pressure, diabetes and cholesterol meds only)? Patient does not have SCP  
denies

## 2024-10-21 ENCOUNTER — OFFICE VISIT (OUTPATIENT)
Dept: CARDIOLOGY | Facility: MEDICAL CENTER | Age: 89
End: 2024-10-21
Attending: INTERNAL MEDICINE
Payer: MEDICARE

## 2024-10-21 VITALS
BODY MASS INDEX: 25.78 KG/M2 | WEIGHT: 151 LBS | HEIGHT: 64 IN | SYSTOLIC BLOOD PRESSURE: 130 MMHG | OXYGEN SATURATION: 90 % | HEART RATE: 83 BPM | DIASTOLIC BLOOD PRESSURE: 68 MMHG

## 2024-10-21 DIAGNOSIS — E78.00 PURE HYPERCHOLESTEROLEMIA: ICD-10-CM

## 2024-10-21 DIAGNOSIS — I48.0 PAROXYSMAL ATRIAL FIBRILLATION (HCC): ICD-10-CM

## 2024-10-21 DIAGNOSIS — I65.23 BILATERAL CAROTID ARTERY STENOSIS: ICD-10-CM

## 2024-10-21 DIAGNOSIS — I10 PRIMARY HYPERTENSION: ICD-10-CM

## 2024-10-21 DIAGNOSIS — Z79.01 CHRONIC ANTICOAGULATION: ICD-10-CM

## 2024-10-21 PROCEDURE — 3075F SYST BP GE 130 - 139MM HG: CPT | Performed by: INTERNAL MEDICINE

## 2024-10-21 PROCEDURE — 99214 OFFICE O/P EST MOD 30 MIN: CPT | Performed by: INTERNAL MEDICINE

## 2024-10-21 PROCEDURE — 99213 OFFICE O/P EST LOW 20 MIN: CPT | Performed by: INTERNAL MEDICINE

## 2024-10-21 PROCEDURE — 3078F DIAST BP <80 MM HG: CPT | Performed by: INTERNAL MEDICINE

## 2024-10-21 ASSESSMENT — ENCOUNTER SYMPTOMS
EYES NEGATIVE: 1
DOUBLE VISION: 0
ABDOMINAL PAIN: 0
VOMITING: 0
MYALGIAS: 0
GASTROINTESTINAL NEGATIVE: 1
CONSTITUTIONAL NEGATIVE: 1
PALPITATIONS: 0
DIZZINESS: 0
NERVOUS/ANXIOUS: 0
CARDIOVASCULAR NEGATIVE: 1
WEIGHT LOSS: 0
BLURRED VISION: 0
CHILLS: 0
SHORTNESS OF BREATH: 0
NAUSEA: 0
WEAKNESS: 0
NEUROLOGICAL NEGATIVE: 1
CLAUDICATION: 0
FEVER: 0
COUGH: 0
FOCAL WEAKNESS: 0
DEPRESSION: 0
BRUISES/BLEEDS EASILY: 0
PSYCHIATRIC NEGATIVE: 1
HEADACHES: 0
MUSCULOSKELETAL NEGATIVE: 1
RESPIRATORY NEGATIVE: 1

## 2024-10-21 ASSESSMENT — FIBROSIS 4 INDEX: FIB4 SCORE: 1.61

## 2025-03-28 ENCOUNTER — OFFICE VISIT (OUTPATIENT)
Dept: MEDICAL GROUP | Facility: CLINIC | Age: OVER 89
End: 2025-03-28
Payer: MEDICARE

## 2025-03-28 ENCOUNTER — APPOINTMENT (OUTPATIENT)
Dept: RADIOLOGY | Facility: MEDICAL CENTER | Age: OVER 89
DRG: 291 | End: 2025-03-28
Attending: STUDENT IN AN ORGANIZED HEALTH CARE EDUCATION/TRAINING PROGRAM
Payer: MEDICARE

## 2025-03-28 ENCOUNTER — HOSPITAL ENCOUNTER (INPATIENT)
Facility: MEDICAL CENTER | Age: OVER 89
LOS: 3 days | End: 2025-03-31
Attending: STUDENT IN AN ORGANIZED HEALTH CARE EDUCATION/TRAINING PROGRAM | Admitting: STUDENT IN AN ORGANIZED HEALTH CARE EDUCATION/TRAINING PROGRAM
Payer: MEDICARE

## 2025-03-28 VITALS
DIASTOLIC BLOOD PRESSURE: 60 MMHG | SYSTOLIC BLOOD PRESSURE: 149 MMHG | OXYGEN SATURATION: 93 % | HEART RATE: 84 BPM | BODY MASS INDEX: 27.29 KG/M2 | WEIGHT: 159 LBS

## 2025-03-28 DIAGNOSIS — M79.89 LEG SWELLING: ICD-10-CM

## 2025-03-28 DIAGNOSIS — Z00.00 MEDICARE ANNUAL WELLNESS VISIT, SUBSEQUENT: ICD-10-CM

## 2025-03-28 DIAGNOSIS — R09.02 HYPOXIA: ICD-10-CM

## 2025-03-28 DIAGNOSIS — Z09 ENCOUNTER FOR EXAMINATION FOLLOWING TREATMENT AT HOSPITAL: ICD-10-CM

## 2025-03-28 DIAGNOSIS — D48.5 NEOPLASM OF UNCERTAIN BEHAVIOR OF SKIN: ICD-10-CM

## 2025-03-28 DIAGNOSIS — E78.5 HYPERLIPIDEMIA, UNSPECIFIED HYPERLIPIDEMIA TYPE: ICD-10-CM

## 2025-03-28 DIAGNOSIS — E03.9 ACQUIRED HYPOTHYROIDISM: ICD-10-CM

## 2025-03-28 DIAGNOSIS — I50.9 NEW ONSET OF CONGESTIVE HEART FAILURE (HCC): ICD-10-CM

## 2025-03-28 DIAGNOSIS — R60.0 BILATERAL LOWER EXTREMITY EDEMA: ICD-10-CM

## 2025-03-28 DIAGNOSIS — Z00.00 HEALTH CARE MAINTENANCE: ICD-10-CM

## 2025-03-28 DIAGNOSIS — R26.89 BALANCE PROBLEMS: ICD-10-CM

## 2025-03-28 DIAGNOSIS — E11.9 TYPE 2 DIABETES MELLITUS WITHOUT COMPLICATION, WITHOUT LONG-TERM CURRENT USE OF INSULIN (HCC): ICD-10-CM

## 2025-03-28 DIAGNOSIS — G45.9 TIA (TRANSIENT ISCHEMIC ATTACK): ICD-10-CM

## 2025-03-28 DIAGNOSIS — I48.0 PAROXYSMAL ATRIAL FIBRILLATION (HCC): ICD-10-CM

## 2025-03-28 DIAGNOSIS — H02.403 PTOSIS OF BOTH EYELIDS: ICD-10-CM

## 2025-03-28 DIAGNOSIS — Z79.01 CHRONIC ANTICOAGULATION: ICD-10-CM

## 2025-03-28 DIAGNOSIS — E66.3 OVERWEIGHT WITH BODY MASS INDEX (BMI) 25.0-29.9: ICD-10-CM

## 2025-03-28 DIAGNOSIS — I10 PRIMARY HYPERTENSION: ICD-10-CM

## 2025-03-28 DIAGNOSIS — D49.9 NEOPLASTIC DISEASE: ICD-10-CM

## 2025-03-28 DIAGNOSIS — G44.229 CHRONIC TENSION-TYPE HEADACHE, NOT INTRACTABLE: ICD-10-CM

## 2025-03-28 DIAGNOSIS — I50.9 CONGESTIVE HEART FAILURE, UNSPECIFIED HF CHRONICITY, UNSPECIFIED HEART FAILURE TYPE (HCC): ICD-10-CM

## 2025-03-28 DIAGNOSIS — R00.0 TACHYCARDIA: ICD-10-CM

## 2025-03-28 DIAGNOSIS — I50.31 ACUTE HEART FAILURE WITH PRESERVED EJECTION FRACTION (HFPEF, >= 50%) (HCC): ICD-10-CM

## 2025-03-28 DIAGNOSIS — J96.01 ACUTE HYPOXEMIC RESPIRATORY FAILURE (HCC): ICD-10-CM

## 2025-03-28 DIAGNOSIS — I27.20 PULMONARY HYPERTENSION (HCC): ICD-10-CM

## 2025-03-28 DIAGNOSIS — I65.23 BILATERAL CAROTID ARTERY STENOSIS: ICD-10-CM

## 2025-03-28 DIAGNOSIS — R41.0 CONFUSION: ICD-10-CM

## 2025-03-28 LAB
ALBUMIN SERPL BCP-MCNC: 4.1 G/DL (ref 3.2–4.9)
ALBUMIN/GLOB SERPL: 1.1 G/DL
ALP SERPL-CCNC: 66 U/L (ref 30–99)
ALT SERPL-CCNC: 17 U/L (ref 2–50)
AMPHET UR QL SCN: NEGATIVE
ANION GAP SERPL CALC-SCNC: 11 MMOL/L (ref 7–16)
AST SERPL-CCNC: 28 U/L (ref 12–45)
BARBITURATES UR QL SCN: NEGATIVE
BASOPHILS # BLD AUTO: 0.7 % (ref 0–1.8)
BASOPHILS # BLD: 0.06 K/UL (ref 0–0.12)
BENZODIAZ UR QL SCN: NEGATIVE
BILIRUB SERPL-MCNC: 0.4 MG/DL (ref 0.1–1.5)
BUN SERPL-MCNC: 28 MG/DL (ref 8–22)
BZE UR QL SCN: NEGATIVE
CALCIUM ALBUM COR SERPL-MCNC: 9.4 MG/DL (ref 8.5–10.5)
CALCIUM SERPL-MCNC: 9.5 MG/DL (ref 8.5–10.5)
CANNABINOIDS UR QL SCN: NEGATIVE
CHLORIDE SERPL-SCNC: 101 MMOL/L (ref 96–112)
CO2 SERPL-SCNC: 28 MMOL/L (ref 20–33)
CREAT SERPL-MCNC: 0.56 MG/DL (ref 0.5–1.4)
D DIMER PPP IA.FEU-MCNC: <0.27 UG/ML (FEU) (ref 0–0.5)
EKG IMPRESSION: NORMAL
EOSINOPHIL # BLD AUTO: 0.04 K/UL (ref 0–0.51)
EOSINOPHIL NFR BLD: 0.4 % (ref 0–6.9)
ERYTHROCYTE [DISTWIDTH] IN BLOOD BY AUTOMATED COUNT: 44.6 FL (ref 35.9–50)
FENTANYL UR QL: NEGATIVE
FLUAV RNA SPEC QL NAA+PROBE: NEGATIVE
FLUBV RNA SPEC QL NAA+PROBE: NEGATIVE
GFR SERPLBLD CREATININE-BSD FMLA CKD-EPI: 85 ML/MIN/1.73 M 2
GLOBULIN SER CALC-MCNC: 3.8 G/DL (ref 1.9–3.5)
GLUCOSE BLD STRIP.AUTO-MCNC: 121 MG/DL (ref 65–99)
GLUCOSE SERPL-MCNC: 133 MG/DL (ref 65–99)
HCT VFR BLD AUTO: 43.8 % (ref 37–47)
HGB BLD-MCNC: 14.8 G/DL (ref 12–16)
IMM GRANULOCYTES # BLD AUTO: 0.04 K/UL (ref 0–0.11)
IMM GRANULOCYTES NFR BLD AUTO: 0.4 % (ref 0–0.9)
LYMPHOCYTES # BLD AUTO: 2.38 K/UL (ref 1–4.8)
LYMPHOCYTES NFR BLD: 26.6 % (ref 22–41)
MAGNESIUM SERPL-MCNC: 1.8 MG/DL (ref 1.5–2.5)
MCH RBC QN AUTO: 31.6 PG (ref 27–33)
MCHC RBC AUTO-ENTMCNC: 33.8 G/DL (ref 32.2–35.5)
MCV RBC AUTO: 93.4 FL (ref 81.4–97.8)
METHADONE UR QL SCN: NEGATIVE
MONOCYTES # BLD AUTO: 0.77 K/UL (ref 0–0.85)
MONOCYTES NFR BLD AUTO: 8.6 % (ref 0–13.4)
NEUTROPHILS # BLD AUTO: 5.66 K/UL (ref 1.82–7.42)
NEUTROPHILS NFR BLD: 63.3 % (ref 44–72)
NRBC # BLD AUTO: 0 K/UL
NRBC BLD-RTO: 0 /100 WBC (ref 0–0.2)
NT-PROBNP SERPL IA-MCNC: 591 PG/ML (ref 0–125)
OPIATES UR QL SCN: NEGATIVE
OXYCODONE UR QL SCN: NEGATIVE
PCP UR QL SCN: NEGATIVE
PLATELET # BLD AUTO: 287 K/UL (ref 164–446)
PMV BLD AUTO: 10.2 FL (ref 9–12.9)
POTASSIUM SERPL-SCNC: 3.8 MMOL/L (ref 3.6–5.5)
PROPOXYPH UR QL SCN: NEGATIVE
PROT SERPL-MCNC: 7.9 G/DL (ref 6–8.2)
RBC # BLD AUTO: 4.69 M/UL (ref 4.2–5.4)
RSV RNA SPEC QL NAA+PROBE: NEGATIVE
SARS-COV-2 RNA RESP QL NAA+PROBE: NOTDETECTED
SODIUM SERPL-SCNC: 140 MMOL/L (ref 135–145)
T4 FREE SERPL-MCNC: 1.79 NG/DL (ref 0.93–1.7)
TROPONIN T SERPL-MCNC: 16 NG/L (ref 6–19)
TSH SERPL DL<=0.005 MIU/L-ACNC: 0.09 UIU/ML (ref 0.38–5.33)
WBC # BLD AUTO: 9 K/UL (ref 4.8–10.8)

## 2025-03-28 PROCEDURE — 83735 ASSAY OF MAGNESIUM: CPT

## 2025-03-28 PROCEDURE — A9270 NON-COVERED ITEM OR SERVICE: HCPCS | Performed by: STUDENT IN AN ORGANIZED HEALTH CARE EDUCATION/TRAINING PROGRAM

## 2025-03-28 PROCEDURE — 700111 HCHG RX REV CODE 636 W/ 250 OVERRIDE (IP): Mod: JZ | Performed by: STUDENT IN AN ORGANIZED HEALTH CARE EDUCATION/TRAINING PROGRAM

## 2025-03-28 PROCEDURE — 85379 FIBRIN DEGRADATION QUANT: CPT

## 2025-03-28 PROCEDURE — 84443 ASSAY THYROID STIM HORMONE: CPT

## 2025-03-28 PROCEDURE — 96374 THER/PROPH/DIAG INJ IV PUSH: CPT

## 2025-03-28 PROCEDURE — 99285 EMERGENCY DEPT VISIT HI MDM: CPT

## 2025-03-28 PROCEDURE — 93005 ELECTROCARDIOGRAM TRACING: CPT | Mod: TC | Performed by: STUDENT IN AN ORGANIZED HEALTH CARE EDUCATION/TRAINING PROGRAM

## 2025-03-28 PROCEDURE — 3077F SYST BP >= 140 MM HG: CPT | Performed by: FAMILY MEDICINE

## 2025-03-28 PROCEDURE — 80053 COMPREHEN METABOLIC PANEL: CPT

## 2025-03-28 PROCEDURE — 0241U HCHG SARS-COV-2 COVID-19 NFCT DS RESP RNA 4 TRGT ED POC: CPT

## 2025-03-28 PROCEDURE — 700102 HCHG RX REV CODE 250 W/ 637 OVERRIDE(OP): Performed by: STUDENT IN AN ORGANIZED HEALTH CARE EDUCATION/TRAINING PROGRAM

## 2025-03-28 PROCEDURE — 36415 COLL VENOUS BLD VENIPUNCTURE: CPT

## 2025-03-28 PROCEDURE — 99214 OFFICE O/P EST MOD 30 MIN: CPT | Performed by: FAMILY MEDICINE

## 2025-03-28 PROCEDURE — 71045 X-RAY EXAM CHEST 1 VIEW: CPT

## 2025-03-28 PROCEDURE — 84484 ASSAY OF TROPONIN QUANT: CPT

## 2025-03-28 PROCEDURE — 80307 DRUG TEST PRSMV CHEM ANLYZR: CPT

## 2025-03-28 PROCEDURE — 700111 HCHG RX REV CODE 636 W/ 250 OVERRIDE (IP): Performed by: STUDENT IN AN ORGANIZED HEALTH CARE EDUCATION/TRAINING PROGRAM

## 2025-03-28 PROCEDURE — 83880 ASSAY OF NATRIURETIC PEPTIDE: CPT

## 2025-03-28 PROCEDURE — 99223 1ST HOSP IP/OBS HIGH 75: CPT | Mod: AI | Performed by: STUDENT IN AN ORGANIZED HEALTH CARE EDUCATION/TRAINING PROGRAM

## 2025-03-28 PROCEDURE — 770020 HCHG ROOM/CARE - TELE (206)

## 2025-03-28 PROCEDURE — 85025 COMPLETE CBC W/AUTO DIFF WBC: CPT

## 2025-03-28 PROCEDURE — 3078F DIAST BP <80 MM HG: CPT | Performed by: FAMILY MEDICINE

## 2025-03-28 PROCEDURE — 93971 EXTREMITY STUDY: CPT | Mod: LT

## 2025-03-28 PROCEDURE — 84439 ASSAY OF FREE THYROXINE: CPT

## 2025-03-28 PROCEDURE — 82962 GLUCOSE BLOOD TEST: CPT | Mod: 91

## 2025-03-28 RX ORDER — DORZOLAMIDE HYDROCHLORIDE AND TIMOLOL MALEATE 20; 5 MG/ML; MG/ML
1 SOLUTION/ DROPS OPHTHALMIC 2 TIMES DAILY
COMMUNITY
Start: 2025-01-11

## 2025-03-28 RX ORDER — ACETAMINOPHEN 325 MG/1
650 TABLET ORAL EVERY 6 HOURS PRN
Status: DISCONTINUED | OUTPATIENT
Start: 2025-03-28 | End: 2025-03-30

## 2025-03-28 RX ORDER — HYDROCHLOROTHIAZIDE 25 MG/1
25 TABLET ORAL
Status: DISCONTINUED | OUTPATIENT
Start: 2025-03-29 | End: 2025-03-31 | Stop reason: HOSPADM

## 2025-03-28 RX ORDER — LEVOTHYROXINE SODIUM 137 UG/1
137 TABLET ORAL
Status: ON HOLD | COMMUNITY
End: 2025-03-31

## 2025-03-28 RX ORDER — DORZOLAMIDE HYDROCHLORIDE AND TIMOLOL MALEATE 20; 5 MG/ML; MG/ML
1 SOLUTION/ DROPS OPHTHALMIC 2 TIMES DAILY
Status: DISCONTINUED | OUTPATIENT
Start: 2025-03-29 | End: 2025-03-31 | Stop reason: HOSPADM

## 2025-03-28 RX ORDER — POLYETHYLENE GLYCOL 3350 17 G/17G
1 POWDER, FOR SOLUTION ORAL
Status: DISCONTINUED | OUTPATIENT
Start: 2025-03-28 | End: 2025-03-31 | Stop reason: HOSPADM

## 2025-03-28 RX ORDER — VALSARTAN AND HYDROCHLOROTHIAZIDE 160; 25 MG/1; MG/1
1 TABLET ORAL DAILY
Status: DISCONTINUED | OUTPATIENT
Start: 2025-03-29 | End: 2025-03-28

## 2025-03-28 RX ORDER — FUROSEMIDE 10 MG/ML
20 INJECTION INTRAMUSCULAR; INTRAVENOUS ONCE
Status: COMPLETED | OUTPATIENT
Start: 2025-03-28 | End: 2025-03-28

## 2025-03-28 RX ORDER — ONDANSETRON 4 MG/1
4 TABLET, ORALLY DISINTEGRATING ORAL EVERY 4 HOURS PRN
Status: DISCONTINUED | OUTPATIENT
Start: 2025-03-28 | End: 2025-03-31 | Stop reason: HOSPADM

## 2025-03-28 RX ORDER — INSULIN LISPRO 100 [IU]/ML
1-6 INJECTION, SOLUTION INTRAVENOUS; SUBCUTANEOUS EVERY 6 HOURS
Status: DISCONTINUED | OUTPATIENT
Start: 2025-03-28 | End: 2025-03-31 | Stop reason: HOSPADM

## 2025-03-28 RX ORDER — VALSARTAN 80 MG/1
160 TABLET ORAL
Status: DISCONTINUED | OUTPATIENT
Start: 2025-03-29 | End: 2025-03-31 | Stop reason: HOSPADM

## 2025-03-28 RX ORDER — AMOXICILLIN 250 MG
2 CAPSULE ORAL EVERY EVENING
Status: DISCONTINUED | OUTPATIENT
Start: 2025-03-28 | End: 2025-03-31 | Stop reason: HOSPADM

## 2025-03-28 RX ORDER — ONDANSETRON 2 MG/ML
4 INJECTION INTRAMUSCULAR; INTRAVENOUS EVERY 4 HOURS PRN
Status: DISCONTINUED | OUTPATIENT
Start: 2025-03-28 | End: 2025-03-31 | Stop reason: HOSPADM

## 2025-03-28 RX ORDER — AMLODIPINE BESYLATE 5 MG/1
5 TABLET ORAL DAILY
Status: DISCONTINUED | OUTPATIENT
Start: 2025-03-29 | End: 2025-03-31 | Stop reason: HOSPADM

## 2025-03-28 RX ORDER — ATENOLOL 50 MG/1
50 TABLET ORAL
Status: DISCONTINUED | OUTPATIENT
Start: 2025-03-29 | End: 2025-03-31 | Stop reason: HOSPADM

## 2025-03-28 RX ORDER — FUROSEMIDE 10 MG/ML
20 INJECTION INTRAMUSCULAR; INTRAVENOUS
Status: DISCONTINUED | OUTPATIENT
Start: 2025-03-29 | End: 2025-03-29

## 2025-03-28 RX ORDER — HYDRALAZINE HYDROCHLORIDE 20 MG/ML
10 INJECTION INTRAMUSCULAR; INTRAVENOUS EVERY 4 HOURS PRN
Status: DISCONTINUED | OUTPATIENT
Start: 2025-03-28 | End: 2025-03-31 | Stop reason: HOSPADM

## 2025-03-28 RX ORDER — MAGNESIUM SULFATE HEPTAHYDRATE 40 MG/ML
2 INJECTION, SOLUTION INTRAVENOUS ONCE
Status: COMPLETED | OUTPATIENT
Start: 2025-03-28 | End: 2025-03-28

## 2025-03-28 RX ORDER — SIMVASTATIN 20 MG
20 TABLET ORAL EVERY MORNING
Status: DISCONTINUED | OUTPATIENT
Start: 2025-03-29 | End: 2025-03-31 | Stop reason: HOSPADM

## 2025-03-28 RX ORDER — DEXTROSE MONOHYDRATE 25 G/50ML
25 INJECTION, SOLUTION INTRAVENOUS
Status: DISCONTINUED | OUTPATIENT
Start: 2025-03-28 | End: 2025-03-31 | Stop reason: HOSPADM

## 2025-03-28 RX ADMIN — INSULIN LISPRO 2 UNITS: 100 INJECTION, SOLUTION INTRAVENOUS; SUBCUTANEOUS at 23:35

## 2025-03-28 RX ADMIN — FUROSEMIDE 20 MG: 10 INJECTION, SOLUTION INTRAVENOUS at 20:16

## 2025-03-28 RX ADMIN — SENNOSIDES AND DOCUSATE SODIUM 2 TABLET: 50; 8.6 TABLET ORAL at 21:17

## 2025-03-28 RX ADMIN — MAGNESIUM SULFATE HEPTAHYDRATE 2 G: 2 INJECTION, SOLUTION INTRAVENOUS at 21:47

## 2025-03-28 RX ADMIN — APIXABAN 5 MG: 5 TABLET, FILM COATED ORAL at 21:17

## 2025-03-28 ASSESSMENT — COGNITIVE AND FUNCTIONAL STATUS - GENERAL
PERSONAL GROOMING: A LITTLE
WALKING IN HOSPITAL ROOM: A LOT
EATING MEALS: A LITTLE
TOILETING: A LITTLE
MOBILITY SCORE: 16
DRESSING REGULAR UPPER BODY CLOTHING: A LOT
DRESSING REGULAR LOWER BODY CLOTHING: A LOT
TURNING FROM BACK TO SIDE WHILE IN FLAT BAD: A LITTLE
DAILY ACTIVITIY SCORE: 16
MOVING TO AND FROM BED TO CHAIR: A LITTLE
STANDING UP FROM CHAIR USING ARMS: A LITTLE
HELP NEEDED FOR BATHING: A LITTLE
CLIMB 3 TO 5 STEPS WITH RAILING: A LOT
MOVING FROM LYING ON BACK TO SITTING ON SIDE OF FLAT BED: A LITTLE
SUGGESTED CMS G CODE MODIFIER DAILY ACTIVITY: CK
SUGGESTED CMS G CODE MODIFIER MOBILITY: CK

## 2025-03-28 ASSESSMENT — ENCOUNTER SYMPTOMS
FEVER: 0
HEADACHES: 0
PALPITATIONS: 0
DIARRHEA: 0
CHILLS: 0
BACK PAIN: 0
DIZZINESS: 0
SHORTNESS OF BREATH: 0
NECK PAIN: 0
BLOOD IN STOOL: 0
ABDOMINAL PAIN: 0
WHEEZING: 0
DOUBLE VISION: 0
NAUSEA: 0
CONSTIPATION: 0
COUGH: 0
BLURRED VISION: 0
EYE PAIN: 0
ORTHOPNEA: 0
VOMITING: 0

## 2025-03-28 ASSESSMENT — LIFESTYLE VARIABLES
ON A TYPICAL DAY WHEN YOU DRINK ALCOHOL HOW MANY DRINKS DO YOU HAVE: 0
ALCOHOL_USE: NO
TOTAL SCORE: 0
DOES PATIENT WANT TO STOP DRINKING: NO
EVER HAD A DRINK FIRST THING IN THE MORNING TO STEADY YOUR NERVES TO GET RID OF A HANGOVER: NO
HOW MANY TIMES IN THE PAST YEAR HAVE YOU HAD 5 OR MORE DRINKS IN A DAY: 0
DO YOU DRINK ALCOHOL: NO
HAVE YOU EVER FELT YOU SHOULD CUT DOWN ON YOUR DRINKING: NO
AVERAGE NUMBER OF DAYS PER WEEK YOU HAVE A DRINK CONTAINING ALCOHOL: 0
TOTAL SCORE: 0
HAVE PEOPLE ANNOYED YOU BY CRITICIZING YOUR DRINKING: NO
CONSUMPTION TOTAL: NEGATIVE
TOTAL SCORE: 0
EVER FELT BAD OR GUILTY ABOUT YOUR DRINKING: NO

## 2025-03-28 ASSESSMENT — CHA2DS2 SCORE
HYPERTENSION: YES
VASCULAR DISEASE: YES
PRIOR STROKE OR TIA OR THROMBOEMBOLISM: YES
CHF OR LEFT VENTRICULAR DYSFUNCTION: YES
AGE 75 OR GREATER: YES
SEX: FEMALE
CHA2DS2 VASC SCORE: 9
AGE 65 TO 74: NO
DIABETES: YES

## 2025-03-28 ASSESSMENT — FIBROSIS 4 INDEX
FIB4 SCORE: 2.18
FIB4 SCORE: 2.18
FIB4 SCORE: 1.61
FIB4 SCORE: 1.61

## 2025-03-28 ASSESSMENT — SOCIAL DETERMINANTS OF HEALTH (SDOH)

## 2025-03-28 ASSESSMENT — PAIN DESCRIPTION - PAIN TYPE
TYPE: ACUTE PAIN
TYPE: ACUTE PAIN

## 2025-03-28 ASSESSMENT — PATIENT HEALTH QUESTIONNAIRE - PHQ9
1. LITTLE INTEREST OR PLEASURE IN DOING THINGS: NOT AT ALL
2. FEELING DOWN, DEPRESSED, IRRITABLE, OR HOPELESS: NOT AT ALL
SUM OF ALL RESPONSES TO PHQ9 QUESTIONS 1 AND 2: 0

## 2025-03-28 NOTE — ASSESSMENT & PLAN NOTE
Both legs are edematous.  The left lower leg has a more localized area which is approximately 7 or 8 inches long and slightly erythematous anteriorly close to the ankle. There is some pitting and tenderness to pressure.  Homans' sign is negative and she is able to ambulate on her own.  Given her history of forced immobility and current unilateral edema with tenderness I think we are obligated to rule out new thrombosis despite the fact that she is on a DOAC.  After discussion of consideration of ordering a stat ultrasound, I feel the safest measure is to send her to the ER now for further evaluation.  If they agree they can get an ultrasound done today.    I will follow-up afterwards.  A secondary issue is that she is feeling less safe in her home alone after this episode and is going to look at getting a roommate versus assisted living.  She is also going to call her daughters who are in [place] and review the situation with them.

## 2025-03-28 NOTE — ED PROVIDER NOTES
"ER Provider Note    Scribed for Elham Shankar D.o. by Mel Brambila. 3/28/2025  4:59 PM    Primary Care Provider: Kevin Bryson M.D.    CHIEF COMPLAINT   Chief Complaint   Patient presents with    Leg Swelling     EXTERNAL RECORDS REVIEWED  Outpatient Notes Patient was evaluated earlier today at [facility] for similar symptoms. Patient was recommended to report to the ED for further evaluation.    HPI/ROS  LIMITATION TO HISTORY   Select: : None  OUTSIDE HISTORIAN(S):  None    Camelia Frederick is a 89 y.o. female who presents to the ED complaining of left leg swelling onset three weeks ago. She reports associated left leg pain and redness, but denies any right leg swelling or pain, fevers, calf pain in the left leg, chest pain, shortness of breath, numbness or tingling. The patient states that she was trying to get out of bed and had difficulty standing secondary to her pain and swelling. The patient notes that she was evaluated by her PCP earlier today who recommended that she report to the ED for concerns of a blood clot. She denies any shortness of breath after walking. The patient states that she is currently taking Eliquis for one year after having a blood clot in her eye. She reports that she has been taking Eliquis as prescribed for the past six weeks. The patient denies any history of liver disease, heaving drinking or hepatitis.     She denies orthopnea, shortness of breath with walking, chest pain.    PAST MEDICAL HISTORY  Past Medical History:   Diagnosis Date    Anemia     \"In the past\"    Blood clotting disorder (HCC) 03/09/2018    Left eye 2 years ago.    Breath shortness 03/09/2018    Occasional with exertion    Cataract     bilateral IOL    Cold 03/09/2018    Pt states feels like there may be phlem building up in her throat, feels like \"I might be coming down with something\".  Pt instructed to call Dr. Rasmussen's office if she develops a sore throat, cold and/or fever.  Pt " "verbalized an understanding.    Dental disorder     lower partial denture    Diabetes     oral medication    High cholesterol     Hypercholesteremia     Hypertension     Hypothyroid     thyroidectomy    Renal disorder 1990's    stones     SURGICAL HISTORY  Past Surgical History:   Procedure Laterality Date    PENETRATING KERATOPLASTY Left 3/13/2018    Procedure: DESCEMETS STRIPPING ENDOTHELIAL KERATOPLASTY;  Surgeon: Guille Rasmussen M.D.;  Location: SURGERY SAME DAY [facility] ORS;  Service: Ophthalmology    PTOSIS REPAIR Bilateral 10/5/2016    Procedure: PTOSIS REPAIR upper lid (levator advanced set up);  Surgeon: Silvino Seay M.D.;  Location: SURGERY SURGICAL [facility] ORS;  Service:     EYE SURGERY Left 2015    Retinal repair \"blood clot removed\"    THYROIDECTOMY  1980's    LITHOTRIPSY  1980's    kidney stone    TONSILLECTOMY  1944    CATARACT EXTRACTION WITH IOL Bilateral 2015/2016     FAMILY HISTORY  Family History   Problem Relation Age of Onset    Heart Attack Neg Hx     Heart Disease Neg Hx      SOCIAL HISTORY   reports that she has never smoked. She has never used smokeless tobacco. She reports current alcohol use. She reports that she does not use drugs.    CURRENT MEDICATIONS  Previous Medications    ALPHAGAN P 0.1 % SOLUTION    INSTILL 1 DROP INTO LEFT EYE TWICE A DAY    AMLODIPINE (NORVASC) 5 MG TAB    Take 1 Tablet by mouth every day.    APIXABAN (ELIQUIS) 5MG TAB    Take 1 Tablet by mouth 2 times a day.    ASCORBIC ACID (ASCORBIC ACID) 500 MG TAB    Take 500 mg by mouth 4 times a day.    ATENOLOL (TENORMIN) 50 MG TAB    Take 1 Tablet by mouth every day.    B COMPLEX-C (SUPER B COMPLEX PO)    Take  by mouth every day.    CALCIUM CITRATE-VITAMIN D (CALCIUM CITRATE + D PO)    Take  by mouth every day.    CHOLECALCIFEROL (VITAMIN D3) 2000 UNIT CAP    Take  by mouth every day.    DORZOLAMIDE-TIMOLOL (COSOPT) 2-0.5 % SOLUTION    INSTILL 1 DROP INTO LEFT EYE TWICE A DAY    LEVOTHYROXINE (SYNTHROID) 137 MCG TAB   " " TAKE 1 TABLET BY MOUTH EVERY MORNING ON AN EMPTY STOMACH    METFORMIN (GLUCOPHAGE) 500 MG TAB    Take 1 Tablet by mouth every day.    MULTIPLE VITAMINS-MINERALS (MULTIVITAMIN ADULT) TAB    Take  by mouth every day.    OMEGA-3 FATTY ACIDS (FISH OIL PO)    Take  by mouth every day.    SIMVASTATIN (ZOCOR) 20 MG TAB    Take 1 Tablet by mouth every evening.    VALSARTAN-HYDROCHLOROTHIAZIDE (DIOVAN-HCT) 160-25 MG PER TABLET    Take 1 Tablet by mouth every day.     ALLERGIES  Codeine, Latex, and Nickel    PHYSICAL EXAM  BP (!) 168/86   Pulse 94   Temp 36.8 °C (98.2 °F) (Temporal)   Resp 18   Ht 1.626 m (5' 4\")   Wt 72 kg (158 lb 11.7 oz)   SpO2 90%   BMI 27.25 kg/m²   Pulse oximetry interpretation: I interpret the pulse oximetry as normal.  Constitutional: Awake and alert. No acute distress.  Head: NCAT.  HEENT: Normal Conjunctiva. 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   Neck: Grossly normal range of motion. Airway midline.  Cardiovascular: Normal heart rate, Normal rhythm.  Thorax & Lungs: No respiratory distress. Clear to Auscultation bilaterally. 90% RA on arrival.  Abdomen: Normal inspection. Nontender. Nondistended  Skin: No obvious rash.  Back: No tenderness, No CVA tenderness.   Musculoskeletal: Moves all extremities Well. 1+ edema to the right lower extremity, 2+ edema to the left lower extremity. Good pulses.   Neurologic: A&Ox4.   Psychiatric: Mood and affect are appropriate for situation.    DIAGNOSTIC STUDIES    EKG/LABS  Results for orders placed or performed during the hospital encounter of 03/28/25   CBC WITH DIFFERENTIAL    Collection Time: 03/28/25  5:31 PM   Result Value Ref Range    WBC 9.0 4.8 - 10.8 K/uL    RBC 4.69 4.20 - 5.40 M/uL    Hemoglobin 14.8 12.0 - 16.0 g/dL    Hematocrit 43.8 37.0 - 47.0 %    MCV 93.4 81.4 - 97.8 fL    MCH 31.6 27.0 - 33.0 pg    MCHC 33.8 32.2 - 35.5 g/dL    RDW 44.6 35.9 - 50.0 fL    Platelet Count 287 164 - 446 K/uL    MPV 10.2 9.0 - 12.9 fL    Neutrophils-Polys " 63.30 44.00 - 72.00 %    Lymphocytes 26.60 22.00 - 41.00 %    Monocytes 8.60 0.00 - 13.40 %    Eosinophils 0.40 0.00 - 6.90 %    Basophils 0.70 0.00 - 1.80 %    Immature Granulocytes 0.40 0.00 - 0.90 %    Nucleated RBC 0.00 0.00 - 0.20 /100 WBC    Neutrophils (Absolute) 5.66 1.82 - 7.42 K/uL    Lymphs (Absolute) 2.38 1.00 - 4.80 K/uL    Monos (Absolute) 0.77 0.00 - 0.85 K/uL    Eos (Absolute) 0.04 0.00 - 0.51 K/uL    Baso (Absolute) 0.06 0.00 - 0.12 K/uL    Immature Granulocytes (abs) 0.04 0.00 - 0.11 K/uL    NRBC (Absolute) 0.00 K/uL   COMP METABOLIC PANEL    Collection Time: 03/28/25  5:31 PM   Result Value Ref Range    Sodium 140 135 - 145 mmol/L    Potassium 3.8 3.6 - 5.5 mmol/L    Chloride 101 96 - 112 mmol/L    Co2 28 20 - 33 mmol/L    Anion Gap 11.0 7.0 - 16.0    Glucose 133 (H) 65 - 99 mg/dL    Bun 28 (H) 8 - 22 mg/dL    Creatinine 0.56 0.50 - 1.40 mg/dL    Calcium 9.5 8.5 - 10.5 mg/dL    Correct Calcium 9.4 8.5 - 10.5 mg/dL    AST(SGOT) 28 12 - 45 U/L    ALT(SGPT) 17 2 - 50 U/L    Alkaline Phosphatase 66 30 - 99 U/L    Total Bilirubin 0.4 0.1 - 1.5 mg/dL    Albumin 4.1 3.2 - 4.9 g/dL    Total Protein 7.9 6.0 - 8.2 g/dL    Globulin 3.8 (H) 1.9 - 3.5 g/dL    A-G Ratio 1.1 g/dL   TROPONIN    Collection Time: 03/28/25  5:31 PM   Result Value Ref Range    Troponin T 16 6 - 19 ng/L   proBrain Natriuretic Peptide, NT    Collection Time: 03/28/25  5:31 PM   Result Value Ref Range    NT-proBNP 591 (H) 0 - 125 pg/mL   ESTIMATED GFR    Collection Time: 03/28/25  5:31 PM   Result Value Ref Range    GFR (CKD-EPI) 85 >60 mL/min/1.73 m 2      NSR 88bpm.  No acute ST or T wave changes.  Normal intervals.  I have independently interpreted the above labs.      RADIOLOGY/PROCEDURES   The attending emergency physician has independently interpreted the diagnostic imaging associated with this visit and am waiting the final reading from the radiologist.   My preliminary interpretation is a follows: No left leg DVT    Radiologist  interpretation:  US-EXTREMITY VENOUS LOWER UNILAT LEFT         DX-CHEST-PORTABLE (1 VIEW)   Final Result      Mild bibasilar atelectasis versus infiltrate.          COURSE & MEDICAL DECISION MAKING     ASSESSMENT, COURSE AND PLAN  Care Narrative:   89-year-old female on anticoagulation for A-fib, otherwise history of hypertension hyperlipidemia here for left greater than right leg swelling for 3 weeks concern for DVT by the primary provider  Afebrile and hypertensive  On exam no respiratory distress, clear lungs, does have 2+ edema left leg, 1+ edema right leg.  Differential includes DVT, heart failure, cirrhosis, nephrosis  EKG nonischemic  Left lower extremity ultrasound without DVT  Labs show elevated proBNP, troponin is normal range, liver and kidney testing is normal.  Chest x-ray no opacity  Patient is hypoxic here.  She is 86 to 88% on room air.  No increased work of breathing or respiratory distress.  In the setting of hypoxia elevated proBNP and leg swelling we will admit her for echocardiogram and evaluation for potential heart failure.      7:27 PM - Patient was reevaluated at bedside. Discussed lab and radiology results with the patient and informed them that they will be admitted to the hospital. Patient was able to ask questions at this time. Patient verbalizes agreement with the plan of care.     7:53 PM - I discussed the patient's case and the above findings with Dr. Ramsey (Hospitalist) who agrees to admit the patient to the hospital.       ADDITIONAL PROBLEM LIST    Hypoxia  Leg swelling  Accelerated hypertension    DISPOSITION AND DISCUSSIONS  I have discussed management of the patient with the following physicians and JACQUELINE's:  Dr. Ramsey (Hospitalist)    Discussion of management with other [facility] or appropriate source(s): None     Barriers to care at this time, including but not limited to:  None .     Decision tools and prescription drugs considered including, but not limited to:   None .    DISPOSITION:  Patient will be hospitalized by Dr. Ramsey (Hospitalist) in guarded condition.    FINAL DIAGNOSIS  1. Hypoxia    2. Congestive heart failure, unspecified HF chronicity, unspecified heart failure type (HCC)       Mel RAZA (Scribe), am scribing for, and in the presence of, Elham Shankar D.O..    Electronically signed by: Mel Brambila (Scribe), 3/28/2025    Elham RAZA D.O. personally performed the services described in this documentation, as scribed by Mel Brambila in my presence, and it is both accurate and complete.    The note accurately reflects work and decisions made by me.  Elham Shankar D.O.  3/28/2025  8:15 PM

## 2025-03-28 NOTE — ED TRIAGE NOTES
"Chief Complaint   Patient presents with    Leg Swelling     Pt arrives from PCP with complaint of left leg pain and swelling x 3 weeks. Pt is on eliquis but MD sent pt to rule out DVT. Pt denies chest pain or shortness of breath at this time. Pt currently aox4, skin warm and dry, airway patent, rr even and unlabored, speaking clear sentences, ambulatory with cane.    BP (!) 168/86   Pulse 94   Temp 36.8 °C (98.2 °F) (Temporal)   Resp 18   Ht 1.626 m (5' 4\")   Wt 72 kg (158 lb 11.7 oz)   SpO2 90%   BMI 27.25 kg/m²     Pt updated on rooming process and to notify staff for worsening condition. Pt sent back to lobby until a room is available.    "

## 2025-03-28 NOTE — PROGRESS NOTES
Subjective:     CC: L leg swelling    HPI:   Camelia presents today to discuss the following issues     Problem   Leg Swelling    Aan had an episode about a week ago where she got up to go to the bathroom and then could not get off the toilet.  She is unclear as to the reason but it seemed to be general weakness.  It took about an hour before she could get mobile and get back to bed.  The next morning she noted an increase in her usual bilateral edema in the left leg particularly in the lower portion near the ankle.  This was fairly painful.  It limited her mobility for a few days and her activities.  Since then the pain has decreased but the localized swelling and mild erythema has persisted. Of note she is on Eliquis for A-fib flutter and has not missed any doses.  She does have a prior history of embolus in her left eye which caused some visual loss.         Current Outpatient Medications Ordered in Epic   Medication Sig Dispense Refill    dorzolamide-timolol (COSOPT) 2-0.5 % Solution INSTILL 1 DROP INTO LEFT EYE TWICE A DAY      simvastatin (ZOCOR) 20 MG Tab Take 1 Tablet by mouth every evening. 90 Tablet 3    amLODIPine (NORVASC) 5 MG Tab Take 1 Tablet by mouth every day. 90 Tablet 3    atenolol (TENORMIN) 50 MG Tab Take 1 Tablet by mouth every day. 90 Tablet 3    valsartan-hydrochlorothiazide (DIOVAN-HCT) 160-25 MG per tablet Take 1 Tablet by mouth every day. 90 Tablet 3    metFORMIN (GLUCOPHAGE) 500 MG Tab Take 1 Tablet by mouth every day. 90 Tablet 3    levothyroxine (SYNTHROID) 137 MCG Tab TAKE 1 TABLET BY MOUTH EVERY MORNING ON AN EMPTY STOMACH 90 Tablet 3    apixaban (ELIQUIS) 5mg Tab Take 1 Tablet by mouth 2 times a day. 180 Tablet 2    ALPHAGAN P 0.1 % Solution INSTILL 1 DROP INTO LEFT EYE TWICE A DAY      ascorbic acid (ASCORBIC ACID) 500 MG Tab Take 500 mg by mouth 4 times a day.      Cholecalciferol (VITAMIN D3) 2000 UNIT Cap Take  by mouth every day.      Multiple Vitamins-Minerals  (MULTIVITAMIN ADULT) Tab Take  by mouth every day.      B Complex-C (SUPER B COMPLEX PO) Take  by mouth every day.      Calcium Citrate-Vitamin D (CALCIUM CITRATE + D PO) Take  by mouth every day.      Omega-3 Fatty Acids (FISH OIL PO) Take  by mouth every day.       No current Epic-ordered facility-administered medications on file.       Health Maintenance:     ROS:  Gen: no fevers/chills, no changes in weight  Eyes: no changes in vision  ENT: no sore throat, no hearing loss, no bloody nose  Pulm: no sob, no cough  CV: no chest pain, no palpitations  GI: no nausea/vomiting, no diarrhea  : no dysuria  MSk: no myalgias  Skin: no rash  Neuro: no headaches, no numbness/tingling  Heme/Lymph: no easy bruising      Objective:     Exam:  BP (!) 149/60 (BP Location: Left arm, Patient Position: Sitting, BP Cuff Size: Adult)   Pulse 84   Wt 72.1 kg (159 lb) Comment: shoes on  SpO2 93%   BMI 27.29 kg/m²  Body mass index is 27.29 kg/m².    Gen: Alert and oriented, No apparent distress.  Neck: Neck is supple without lymphadenopathy.  Lungs: Normal effort, CTA bilaterally, no wheezes, rhonchi, or rales  CV: Regular rate and rhythm. No murmurs, rubs, or gallops.  Ext: No clubbing, cyanosis, edema.          Assessment & Plan:     89 y.o. female with the following -     Problem List Items Addressed This Visit       Leg swelling    Both legs are edematous.  The left lower leg has a more localized area which is approximately 7 or 8 inches long and slightly erythematous anteriorly close to the ankle. There is some pitting and tenderness to pressure.  Homans' sign is negative and she is able to ambulate on her own.  Given her history of forced immobility and current unilateral edema with tenderness I think we are obligated to rule out new thrombosis despite the fact that she is on a DOAC.  After discussion of consideration of ordering a stat ultrasound, I feel the safest measure is to send her to the ER now for further evaluation.   If they agree they can get an ultrasound done today.    I will follow-up afterwards.  A secondary issue is that she is feeling less safe in her home alone after this episode and is going to look at getting a roommate versus assisted living.  She is also going to call her daughters who are in [place] and review the situation with them.                  No follow-ups on file.

## 2025-03-29 ENCOUNTER — APPOINTMENT (OUTPATIENT)
Dept: CARDIOLOGY | Facility: MEDICAL CENTER | Age: OVER 89
DRG: 291 | End: 2025-03-29
Attending: STUDENT IN AN ORGANIZED HEALTH CARE EDUCATION/TRAINING PROGRAM
Payer: MEDICARE

## 2025-03-29 PROBLEM — R60.0 BILATERAL LOWER EXTREMITY EDEMA: Status: ACTIVE | Noted: 2025-03-29

## 2025-03-29 LAB
ALBUMIN SERPL BCP-MCNC: 3.8 G/DL (ref 3.2–4.9)
ALBUMIN/GLOB SERPL: 1.3 G/DL
ALP SERPL-CCNC: 56 U/L (ref 30–99)
ALT SERPL-CCNC: 14 U/L (ref 2–50)
ANION GAP SERPL CALC-SCNC: 12 MMOL/L (ref 7–16)
AST SERPL-CCNC: 22 U/L (ref 12–45)
BILIRUB SERPL-MCNC: 0.4 MG/DL (ref 0.1–1.5)
BUN SERPL-MCNC: 26 MG/DL (ref 8–22)
CALCIUM ALBUM COR SERPL-MCNC: 9.1 MG/DL (ref 8.5–10.5)
CALCIUM SERPL-MCNC: 8.9 MG/DL (ref 8.5–10.5)
CHLORIDE SERPL-SCNC: 99 MMOL/L (ref 96–112)
CO2 SERPL-SCNC: 31 MMOL/L (ref 20–33)
CREAT SERPL-MCNC: 0.65 MG/DL (ref 0.5–1.4)
ERYTHROCYTE [DISTWIDTH] IN BLOOD BY AUTOMATED COUNT: 42.6 FL (ref 35.9–50)
ETHANOL BLD-MCNC: <10.1 MG/DL
GFR SERPLBLD CREATININE-BSD FMLA CKD-EPI: 82 ML/MIN/1.73 M 2
GLOBULIN SER CALC-MCNC: 2.9 G/DL (ref 1.9–3.5)
GLUCOSE BLD STRIP.AUTO-MCNC: 113 MG/DL (ref 65–99)
GLUCOSE BLD STRIP.AUTO-MCNC: 116 MG/DL (ref 65–99)
GLUCOSE BLD STRIP.AUTO-MCNC: 120 MG/DL (ref 65–99)
GLUCOSE BLD STRIP.AUTO-MCNC: 142 MG/DL (ref 65–99)
GLUCOSE BLD STRIP.AUTO-MCNC: 202 MG/DL (ref 65–99)
GLUCOSE SERPL-MCNC: 148 MG/DL (ref 65–99)
HCT VFR BLD AUTO: 39.1 % (ref 37–47)
HGB BLD-MCNC: 13.2 G/DL (ref 12–16)
LV EJECT FRACT  99904: 65
LV EJECT FRACT MOD 4C 99902: 65.07
MAGNESIUM SERPL-MCNC: 2.5 MG/DL (ref 1.5–2.5)
MCH RBC QN AUTO: 31.1 PG (ref 27–33)
MCHC RBC AUTO-ENTMCNC: 33.8 G/DL (ref 32.2–35.5)
MCV RBC AUTO: 92.2 FL (ref 81.4–97.8)
PLATELET # BLD AUTO: 276 K/UL (ref 164–446)
PMV BLD AUTO: 10.5 FL (ref 9–12.9)
POTASSIUM SERPL-SCNC: 3.1 MMOL/L (ref 3.6–5.5)
PROT SERPL-MCNC: 6.7 G/DL (ref 6–8.2)
RBC # BLD AUTO: 4.24 M/UL (ref 4.2–5.4)
SODIUM SERPL-SCNC: 142 MMOL/L (ref 135–145)
WBC # BLD AUTO: 8.8 K/UL (ref 4.8–10.8)

## 2025-03-29 PROCEDURE — A9270 NON-COVERED ITEM OR SERVICE: HCPCS | Performed by: STUDENT IN AN ORGANIZED HEALTH CARE EDUCATION/TRAINING PROGRAM

## 2025-03-29 PROCEDURE — 82962 GLUCOSE BLOOD TEST: CPT

## 2025-03-29 PROCEDURE — 93306 TTE W/DOPPLER COMPLETE: CPT | Mod: 26 | Performed by: INTERNAL MEDICINE

## 2025-03-29 PROCEDURE — 93306 TTE W/DOPPLER COMPLETE: CPT

## 2025-03-29 PROCEDURE — 700111 HCHG RX REV CODE 636 W/ 250 OVERRIDE (IP): Mod: JZ | Performed by: STUDENT IN AN ORGANIZED HEALTH CARE EDUCATION/TRAINING PROGRAM

## 2025-03-29 PROCEDURE — 94669 MECHANICAL CHEST WALL OSCILL: CPT

## 2025-03-29 PROCEDURE — 700102 HCHG RX REV CODE 250 W/ 637 OVERRIDE(OP): Performed by: STUDENT IN AN ORGANIZED HEALTH CARE EDUCATION/TRAINING PROGRAM

## 2025-03-29 PROCEDURE — A9270 NON-COVERED ITEM OR SERVICE: HCPCS

## 2025-03-29 PROCEDURE — 83735 ASSAY OF MAGNESIUM: CPT

## 2025-03-29 PROCEDURE — 82077 ASSAY SPEC XCP UR&BREATH IA: CPT

## 2025-03-29 PROCEDURE — 99233 SBSQ HOSP IP/OBS HIGH 50: CPT | Performed by: STUDENT IN AN ORGANIZED HEALTH CARE EDUCATION/TRAINING PROGRAM

## 2025-03-29 PROCEDURE — 85027 COMPLETE CBC AUTOMATED: CPT

## 2025-03-29 PROCEDURE — 80053 COMPREHEN METABOLIC PANEL: CPT

## 2025-03-29 PROCEDURE — 770020 HCHG ROOM/CARE - TELE (206)

## 2025-03-29 PROCEDURE — 700102 HCHG RX REV CODE 250 W/ 637 OVERRIDE(OP)

## 2025-03-29 PROCEDURE — 36415 COLL VENOUS BLD VENIPUNCTURE: CPT

## 2025-03-29 RX ORDER — FUROSEMIDE 10 MG/ML
20 INJECTION INTRAMUSCULAR; INTRAVENOUS ONCE
Status: COMPLETED | OUTPATIENT
Start: 2025-03-29 | End: 2025-03-29

## 2025-03-29 RX ORDER — LEVOTHYROXINE SODIUM 50 UG/1
50 TABLET ORAL
Status: DISCONTINUED | OUTPATIENT
Start: 2025-03-30 | End: 2025-03-31 | Stop reason: HOSPADM

## 2025-03-29 RX ORDER — FUROSEMIDE 10 MG/ML
40 INJECTION INTRAMUSCULAR; INTRAVENOUS
Status: DISCONTINUED | OUTPATIENT
Start: 2025-03-29 | End: 2025-03-30

## 2025-03-29 RX ORDER — POTASSIUM CHLORIDE 1500 MG/1
40 TABLET, EXTENDED RELEASE ORAL ONCE
Status: COMPLETED | OUTPATIENT
Start: 2025-03-29 | End: 2025-03-29

## 2025-03-29 RX ORDER — POTASSIUM CHLORIDE 1500 MG/1
40 TABLET, EXTENDED RELEASE ORAL EVERY 6 HOURS
Status: COMPLETED | OUTPATIENT
Start: 2025-03-29 | End: 2025-03-29

## 2025-03-29 RX ADMIN — FUROSEMIDE 20 MG: 10 INJECTION, SOLUTION INTRAVENOUS at 14:23

## 2025-03-29 RX ADMIN — APIXABAN 5 MG: 5 TABLET, FILM COATED ORAL at 05:16

## 2025-03-29 RX ADMIN — APIXABAN 5 MG: 5 TABLET, FILM COATED ORAL at 17:16

## 2025-03-29 RX ADMIN — VALSARTAN 160 MG: 80 TABLET ORAL at 05:15

## 2025-03-29 RX ADMIN — POTASSIUM CHLORIDE 40 MEQ: 1500 TABLET, EXTENDED RELEASE ORAL at 12:23

## 2025-03-29 RX ADMIN — HYDROCHLOROTHIAZIDE 25 MG: 25 TABLET ORAL at 05:15

## 2025-03-29 RX ADMIN — DORZOLAMIDE HYDROCHLORIDE AND TIMOLOL MALEATE 1 DROP: 20; 5 SOLUTION/ DROPS OPHTHALMIC at 17:16

## 2025-03-29 RX ADMIN — ATENOLOL 50 MG: 50 TABLET ORAL at 08:53

## 2025-03-29 RX ADMIN — SIMVASTATIN 20 MG: 20 TABLET, FILM COATED ORAL at 05:16

## 2025-03-29 RX ADMIN — FUROSEMIDE 40 MG: 10 INJECTION, SOLUTION INTRAVENOUS at 15:45

## 2025-03-29 RX ADMIN — POTASSIUM CHLORIDE 40 MEQ: 1500 TABLET, EXTENDED RELEASE ORAL at 08:54

## 2025-03-29 RX ADMIN — POTASSIUM CHLORIDE 40 MEQ: 1500 TABLET, EXTENDED RELEASE ORAL at 05:19

## 2025-03-29 RX ADMIN — SENNOSIDES AND DOCUSATE SODIUM 2 TABLET: 50; 8.6 TABLET ORAL at 17:16

## 2025-03-29 RX ADMIN — FUROSEMIDE 20 MG: 10 INJECTION, SOLUTION INTRAVENOUS at 05:16

## 2025-03-29 RX ADMIN — POTASSIUM CHLORIDE 40 MEQ: 1500 TABLET, EXTENDED RELEASE ORAL at 17:16

## 2025-03-29 RX ADMIN — DORZOLAMIDE HYDROCHLORIDE AND TIMOLOL MALEATE 1 DROP: 20; 5 SOLUTION/ DROPS OPHTHALMIC at 05:19

## 2025-03-29 RX ADMIN — LEVOTHYROXINE SODIUM 137 MCG: 0.11 TABLET ORAL at 05:15

## 2025-03-29 RX ADMIN — AMLODIPINE BESYLATE 5 MG: 5 TABLET ORAL at 05:15

## 2025-03-29 ASSESSMENT — COPD QUESTIONNAIRES
COPD SCREENING SCORE: 2
DO YOU EVER COUGH UP ANY MUCUS OR PHLEGM?: NO/ONLY WITH OCCASIONAL COLDS OR INFECTIONS
HAVE YOU SMOKED AT LEAST 100 CIGARETTES IN YOUR ENTIRE LIFE: NO/DON'T KNOW
DURING THE PAST 4 WEEKS HOW MUCH DID YOU FEEL SHORT OF BREATH: NONE/LITTLE OF THE TIME

## 2025-03-29 ASSESSMENT — FIBROSIS 4 INDEX: FIB4 SCORE: 1.959915774024445487

## 2025-03-29 ASSESSMENT — PAIN DESCRIPTION - PAIN TYPE: TYPE: ACUTE PAIN

## 2025-03-29 NOTE — ASSESSMENT & PLAN NOTE
-Continue home levothyroxine  -Order TSH    3/29/2025  TSH is low at 0.88.  I decreased patient's levothyroxine to 50 mcg daily.

## 2025-03-29 NOTE — CARE PLAN
The patient is Stable - Low risk of patient condition declining or worsening    Shift Goals  Clinical Goals: Diurese, Wean O2, VSS  Patient Goals: Eat something, updates, sleep  Family Goals: ROSALVA    Progress made toward(s) clinical / shift goals:        Problem: Care Map:  Admission Optimal Outcome for the Heart Failure Patient  Goal: Admission:  Optimal Care of the heart failure patient  Description: Target End Date:  end of day 1  Outcome: Progressing  Intervention: Ejection fraction documented or plan to assess documented  Note: ECHO Ordered  Intervention: Hat in bathroom or urinal to bedside (Babb if applicable)  Note: Female wick in use for I/O's  Intervention: Confirm IV diuretic is ordered  Note: Pt receiving IV Lasix  Intervention: Assess and document 2 hour post diuretic output  Description: Notify provider if urine output is less than 250 mL and shortness of breath continues 2 hours post-diuretic administration  Note: I/O's documented 2 hours post diuretic   Intervention: Baseline weight documented  (stand up scale, unless contraindicated)  Note: Baseline weight documented on stand up scale   Intervention: Assess for Influenza vaccine if in season  Note: Pt stated influenza vaccine already received this season.   Intervention: Ask patient to opt-in for Meds to Beds Program  Note: Pt opted in to meds-to-beds  Intervention: Identify potential discharge barriers and consult case management as appropriate (ex: uninsured, food insecurity, lack of transport, etc.)  Note: Case management consult placed along with PT/OT eval.      Problem: Pain - Standard  Goal: Alleviation of pain or a reduction in pain to the patient’s comfort goal  Outcome: Progressing     Problem: Knowledge Deficit - Standard  Goal: Patient and family/care givers will demonstrate understanding of plan of care, disease process/condition, diagnostic tests and medications  Outcome: Progressing     Problem: Fall Risk  Goal: Patient will remain  free from falls  Outcome: Progressing

## 2025-03-29 NOTE — CARE PLAN
Problem: Pain - Standard  Goal: Alleviation of pain or a reduction in pain to the patient’s comfort goal  Outcome: Progressing  Flowsheets (Taken 3/29/2025 1431)  OB Pain Intervention:   Rest   Repositioned   Relaxation technique  Pain Rating Scale (NPRS): 2  Note: Pt assessed for pain Q4h and Pt instructed to notify RN of any new or increasing pain to prevent it from becoming intolerable. Verbalized understanding.       Problem: Knowledge Deficit - Standard  Goal: Patient and family/care givers will demonstrate understanding of plan of care, disease process/condition, diagnostic tests and medications  Outcome: Progressing  Note: Pt educated regarding plan of care for the day, activity, diet, and medications. Verbalized understanding.       Problem: Fall Risk  Goal: Patient will remain free from falls  Outcome: Progressing  Note: Fall risk assessed and fall precautions in place, pt 1 person assist. Bed alarm on, appropriate signs in place, call light and personal belongings within reach. Pt educated to call for help and not get up without assistance. Verbalized understanding.      Problem: Care Map:  Day 1 Optimal Outcome for the Heart Failure Patient  Goal: Day 1:  Optimal Care of the heart failure patient  Outcome: Progressing     Problem: Skin Integrity  Goal: Skin integrity is maintained or improved  Outcome: Progressing  Note: Encouraged to frequently reposition self while in bed  frequent cleansing of skin, skin protectant used. Frequent monitoring of skin integrity.     The patient is Stable - Low risk of patient condition declining or worsening    Shift Goals  Clinical Goals: Monitor VS and labs; safety from fall  Patient Goals: get better  Family Goals: ROSALVA    Progress made toward(s) clinical / shift goals:   Patient was receptive to care, ambulated to the bathroom and tolerated fairly well, call light within reach and bed alarm kept on,BP (!) 146/81   Pulse 76   Temp 36 °C (96.8 °F) (Temporal)   Resp  "17   Ht 1.626 m (5' 4\")   Wt 70.7 kg (155 lb 13.8 oz)   SpO2 92%         "

## 2025-03-29 NOTE — ASSESSMENT & PLAN NOTE
-Hypoglycemia protocol  -SSI    3/29/2025  Controlled with minimal lispro insulin sign scale    3/30/2025  Remains controlled  Continue lispro insulin sign scale

## 2025-03-29 NOTE — PROGRESS NOTES
Bedside report received from off going RN/tech: Peyton assumed care of patient.     Fall Risk Score: HIGH RISK  Fall risk interventions in place: Place yellow fall risk ID band on patient, Provide patient/family education based on risk assessment, Educate patient/family to call staff for assistance when getting out of bed, Place fall precaution signage outside patient door, Utilize bed/chair fall alarm, Notify charge of high risk for huddle, and Bed alarm connected correctly  Bed type: Low air loss (Dell Score less than 17 interventions in place)  Patient on cardiac monitor: Yes  IVF/IV medications: Not Applicable   Oxygen: How many liters 2L  Bedside sitter: Not Applicable   Isolation: Not applicable

## 2025-03-29 NOTE — ED NOTES
Med rec completed per patient, with patient's friend at bedside    Allergies reviewed.    Outpatient antibiotics within the last 30 days: NONE.    ANTICOAGULANTS: Patient is on ELIQUIS. Last dose 3/28/2025 at around 08:00.    Preferred pharmacy: [facility] on [address].

## 2025-03-29 NOTE — PROGRESS NOTES
4 Eyes Skin Assessment Completed by SANTOSH Young and SANTOSH Cage.    Head WDL  Ears Redness and Blanching  Nose WDL  Mouth WDL  Neck WDL  Breast/Chest Redness and Blanching  Shoulder Blades WDL  Spine WDL  (R) Arm/Elbow/Hand Redness, Blanching, and Bruising  (L) Arm/Elbow/Hand Redness, Blanching, and Bruising  Abdomen WDL  Groin Redness  Scrotum/Coccyx/Buttocks Redness and Blanching  (R) Leg Redness, Bruising, and Edema  (L) Leg Redness, Bruising, and Edema  (R) Heel/Foot/Toe Redness and Blanching  (L) Heel/Foot/Toe Redness and Blanching          Devices In Places Tele Box, Blood Pressure Cuff, Pulse Ox, and Nasal Cannula      Interventions In Place NC W/Ear Foams and Pillows    Possible Skin Injury No    Pictures Uploaded Into Epic N/A  Wound Consult Placed N/A  RN Wound Prevention Protocol Ordered Yes

## 2025-03-29 NOTE — ASSESSMENT & PLAN NOTE
Saturating 82% on room air requiring 2 L nasal cannula, with respiratory rate up to 18. Troponin 16, NT proBNP 591.  Chest x-ray showing bibasilar atelectasis.  Left lower extremity DVT ultrasound without DVT. EKG NSR HR 88, QTc 470, without ST changes    -Order COVID/flu/RSV and D-dimer  -IV Lasix for likely new onset CHF  -RT protocol  -Supplemental O2 as needed  -Wean oxygen as tolerated  -Incentive spirometry    3/29/2025  Weaning down to 2 LPM.  I have ordered PEP therapy.  Continue forced diuresis with increased furosemide 40 mg IV twice daily.  Continue hydrochlorothiazide.   Continuous cardiac monitoring during forced diuresis to monitor for arrhythmias.    3/30/2025  Continues to require 1 LPM oxygen by nasal cannula  Increase furosemide to 60 mg IV twice daily  Continue home hydrochlorothiazide  Continuous cardiac monitoring during forced diuresis to monitor for arrhythmias.

## 2025-03-29 NOTE — H&P
Hospital Medicine History & Physical Note    Date of Service  3/28/2025    Primary Care Physician  Kevin Bryson M.D.    Consultants  none    Specialist Names: N/A    Code Status  Full Code    Chief Complaint  Chief Complaint   Patient presents with    Leg Swelling       History of Presenting Illness  Patient is a 89-year-old female past medical history of hypothyroidism, NIDDM 2, HLD, HTN, paroxysmal atrial fibrillation on apixaban presents with 3-week worsening of bilateral lower extremity edema.    Patient states that there legs have been swollen for the past 3 weeks, has worsened over that time period.  Sent to the ED by PCP for concern of possible DVT.     In the ED, BP 140s to 160s/60s to 80s, HR 80s to 90s, RR 15-18, afebrile, saturating 82% on room air requiring 2 L nasal cannula.  Received Lasix 20 mg IV x 1.  WBC 9.0, hemoglobin 14.8, , sodium 140, potassium 3.8, bicarb 28, anion gap 11, BUN 28, creatinine 0.56, troponin 16, NT proBNP 591.  Chest x-ray showing bibasilar atelectasis.  Left lower extremity DVT ultrasound without DVT. EKG NSR HR 88, QTc 470, without ST changes.    I discussed the plan of care with patient.    Review of Systems  Review of Systems   Constitutional:  Negative for chills and fever.   HENT:  Negative for ear pain.    Eyes:  Negative for blurred vision, double vision and pain.   Respiratory:  Negative for cough, shortness of breath and wheezing.    Cardiovascular:  Positive for leg swelling. Negative for chest pain, palpitations and orthopnea.   Gastrointestinal:  Negative for abdominal pain, blood in stool, constipation, diarrhea, melena, nausea and vomiting.   Genitourinary:  Negative for dysuria, frequency and hematuria.   Musculoskeletal:  Negative for back pain, joint pain and neck pain.   Neurological:  Negative for dizziness and headaches.       Past Medical History   has a past medical history of Anemia, Blood clotting disorder (HCC) (03/09/2018), Breath shortness  (03/09/2018), Cataract, Cold (03/09/2018), Dental disorder, Diabetes, High cholesterol, Hypercholesteremia, Hypertension, Hypothyroid, and Renal disorder (1990's).    Surgical History   has a past surgical history that includes tonsillectomy (1944); cataract extraction with iol (Bilateral, 2015/2016); eye surgery (Left, 2015); thyroidectomy (1980's); lithotripsy (1980's); ptosis repair (Bilateral, 10/5/2016); and penetrating keratoplasty (Left, 3/13/2018).     Family History  family history is not on file.   Family history reviewed with patient. There is no family history that is pertinent to the chief complaint.     Social History   reports that she has never smoked. She has never used smokeless tobacco. She reports current alcohol use. She reports that she does not use drugs.    Allergies  Allergies   Allergen Reactions    Codeine Nausea    Latex Rash          Nickel Rash             Medications  Prior to Admission Medications   Prescriptions Last Dose Informant Patient Reported? Taking?   B Complex-C (SUPER B COMPLEX PO)  Patient Yes No   Sig: Take  by mouth every day.   Multiple Vitamins-Minerals (MULTIVITAMIN ADULT) Tab  Patient Yes No   Sig: Take  by mouth every day.   Omega-3 Fatty Acids (FISH OIL PO)  Patient Yes No   Sig: Take  by mouth every day.   amLODIPine (NORVASC) 5 MG Tab   No No   Sig: Take 1 Tablet by mouth every day.   apixaban (ELIQUIS) 5mg Tab   No No   Sig: Take 1 Tablet by mouth 2 times a day.   atenolol (TENORMIN) 50 MG Tab   No No   Sig: Take 1 Tablet by mouth every day.   dorzolamide-timolol (COSOPT) 2-0.5 % Solution   Yes No   Sig: INSTILL 1 DROP INTO LEFT EYE TWICE A DAY   levothyroxine (SYNTHROID) 137 MCG Tab   No No   Sig: TAKE 1 TABLET BY MOUTH EVERY MORNING ON AN EMPTY STOMACH   metFORMIN (GLUCOPHAGE) 500 MG Tab   No No   Sig: Take 1 Tablet by mouth every day.   simvastatin (ZOCOR) 20 MG Tab   No No   Sig: Take 1 Tablet by mouth every evening.   valsartan-hydrochlorothiazide  (DIOVAN-HCT) 160-25 MG per tablet   No No   Sig: Take 1 Tablet by mouth every day.      Facility-Administered Medications: None       Physical Exam  Temp:  [36.8 °C (98.2 °F)] 36.8 °C (98.2 °F)  Pulse:  [83-94] 83  Resp:  [15-18] 16  BP: (141-168)/(60-86) 141/61  SpO2:  [82 %-96 %] 95 %  Blood Pressure : (!) 141/61   Temperature: 36.8 °C (98.2 °F)   Pulse: 83   Respiration: 16   Pulse Oximetry: 95 %       Physical Exam  Vitals reviewed.   Constitutional:       General: She is not in acute distress.     Appearance: Normal appearance. She is not ill-appearing, toxic-appearing or diaphoretic.      Comments: Appears younger than stated age   HENT:      Head: Normocephalic and atraumatic.      Mouth/Throat:      Mouth: Mucous membranes are moist.      Pharynx: No oropharyngeal exudate or posterior oropharyngeal erythema.   Eyes:      General: No scleral icterus.     Extraocular Movements: Extraocular movements intact.      Conjunctiva/sclera: Conjunctivae normal.   Cardiovascular:      Rate and Rhythm: Normal rate and regular rhythm.      Heart sounds: Normal heart sounds. No murmur heard.     No friction rub. No gallop.   Pulmonary:      Effort: Pulmonary effort is normal. No respiratory distress.      Breath sounds: Normal breath sounds. No stridor. No wheezing, rhonchi or rales.   Abdominal:      General: Abdomen is flat. There is no distension.      Palpations: Abdomen is soft. There is no mass.      Tenderness: There is no abdominal tenderness. There is no guarding or rebound.      Hernia: No hernia is present.   Musculoskeletal:         General: No swelling or tenderness. Normal range of motion.      Cervical back: Neck supple. No rigidity.      Right lower leg: Edema present.      Left lower leg: Edema present.      Comments: Bilateral lower extremity 1+ pitting edema up to knees, left slightly worse than right   Lymphadenopathy:      Cervical: No cervical adenopathy.   Skin:     General: Skin is warm and dry.       Coloration: Skin is not jaundiced.   Neurological:      Mental Status: She is alert and oriented to person, place, and time. Mental status is at baseline.      Cranial Nerves: No cranial nerve deficit.         Laboratory:  Recent Labs     03/28/25  1731   WBC 9.0   RBC 4.69   HEMOGLOBIN 14.8   HEMATOCRIT 43.8   MCV 93.4   MCH 31.6   MCHC 33.8   RDW 44.6   PLATELETCT 287   MPV 10.2     Recent Labs     03/28/25  1731   SODIUM 140   POTASSIUM 3.8   CHLORIDE 101   CO2 28   GLUCOSE 133*   BUN 28*   CREATININE 0.56   CALCIUM 9.5     Recent Labs     03/28/25  1731   ALTSGPT 17   ASTSGOT 28   ALKPHOSPHAT 66   TBILIRUBIN 0.4   GLUCOSE 133*         Recent Labs     03/28/25  1731   NTPROBNP 591*         Recent Labs     03/28/25  1731   TROPONINT 16       Imaging:  US-EXTREMITY VENOUS LOWER UNILAT LEFT   Final Result      DX-CHEST-PORTABLE (1 VIEW)   Final Result      Mild bibasilar atelectasis versus infiltrate.      EC-ECHOCARDIOGRAM COMPLETE W/O CONT    (Results Pending)       X-Ray:  I have personally reviewed the images and compared with prior images.  EKG:  I have personally reviewed the images and compared with prior images.    Assessment/Plan:  Justification for Admission Status  I anticipate this patient will require at least two midnights for appropriate medical management, necessitating inpatient admission because likely new onset heart failure requiring IV diuresis and further work up    Patient will need a Telemetry bed on MEDICAL service .  The need is secondary to likely new onset heart failure requiring IV diuresis and further work up.    * New onset of congestive heart failure (HCC)- (present on admission)  Assessment & Plan  Patient with 3-week history of worsening bilateral lower extremity edema.  Troponin 16, NT proBNP 591.  Chest x-ray showing bibasilar atelectasis.  Left lower extremity DVT ultrasound without DVT. EKG NSR HR 88, QTc 470, without ST changes.  TTE 3/18/2024 EF 75%, normal diastolic  function, mildly dilated LA, trace MR, mild TR, RVSP 55 mmHg.     -Telemetry  -Daily weights and I&Os  -Low sodium diet with 2L fluid restriction  -Order D-dimer  -Order TTE  -Order TSH/FT4, alcohol level, UDS  -Lasix 20mg IV bid  -K>4, Mg>2    Acute hypoxemic respiratory failure (HCC)- (present on admission)  Assessment & Plan  Saturating 82% on room air requiring 2 L nasal cannula, with respiratory rate up to 18. Troponin 16, NT proBNP 591.  Chest x-ray showing bibasilar atelectasis.  Left lower extremity DVT ultrasound without DVT. EKG NSR HR 88, QTc 470, without ST changes    -Order COVID/flu/RSV and D-dimer  -IV Lasix for likely new onset CHF  -RT protocol  -Supplemental O2 as needed  -Wean oxygen as tolerated  -Incentive spirometry    Paroxysmal atrial fibrillation (HCC)- (present on admission)  Assessment & Plan  Not in RVR.    -Continue home atenolol and apixaban    Primary hypertension- (present on admission)  Assessment & Plan  -Continue home valsartan, HCTZ, atenolol, amlodipine  -As needed hydralazine    Hyperlipidemia- (present on admission)  Assessment & Plan  -Continue home simvastatin    Type 2 diabetes mellitus without complication (HCC)- (present on admission)  Assessment & Plan  -Hypoglycemia protocol  -SSI    Acquired hypothyroidism- (present on admission)  Assessment & Plan  -Continue home levothyroxine  -Order TSH        VTE prophylaxis: SCDs/TEDs and therapeutic anticoagulation with apixaban

## 2025-03-29 NOTE — ASSESSMENT & PLAN NOTE
Patient with 3-week history of worsening bilateral lower extremity edema.  Troponin 16, NT proBNP 591.  Chest x-ray showing bibasilar atelectasis.  Left lower extremity DVT ultrasound without DVT. EKG NSR HR 88, QTc 470, without ST changes.  TTE 3/18/2024 EF 75%, normal diastolic function, mildly dilated LA, trace MR, mild TR, RVSP 55 mmHg.     -Telemetry  -Daily weights and I&Os  -Low sodium diet with 2L fluid restriction  -Order D-dimer  -Order TTE  -Order TSH/FT4, alcohol level, UDS  -Lasix 20mg IV bid  -K>4, Mg>2    3/29/2025  Currently on 1 LPM oxygen by nasal cannula weaning down from 2 LPM.  She was hypoxic down to 82% on room air in the emergency room.  Baseline is room air  Urine output was 1.3 L yesterday  NT-proBNP mildly elevated at 591.   I have increased patient's furosemide to 40 mg IV twice daily.  Continue hydrochlorothiazide and valsartan  Holding amlodipine and Tenormin  Awaiting echocardiogram.  Continuous cardiac monitoring during forced diuresis to monitor for arrhythmias.    3/30/2025  Continues to require 1 LPM oxygen by nasal cannula.  Lower extremity edema improving.  PT recommending postacute placement.  Skilled nursing facility referral placed.  Increasing furosemide 60 mg IV to twice daily.  Continue hydrochlorothiazide 25 mg daily  Urine output of 2.6 L yesterday.   Potassium of 4.5 with creatinine of 0.8.  Echocardiogram showing ejection fraction of 65% with RVSP of 35 mmHg.  Mild aortic insufficiency with mild MAC and MR.  No significant valvular disease.  Continuous cardiac monitoring during forced diuresis to monitor for arrhythmias.

## 2025-03-29 NOTE — PROGRESS NOTES
Hospital Medicine Daily Progress Note    Date of Service  3/29/2025    Chief Complaint  Camelia Frederick is a 89 y.o. female admitted 3/28/2025 with Lower extremity edema.    Hospital Course  Patient is a 89-year-old female past medical history of hypothyroidism, NIDDM 2, HLD, HTN, paroxysmal atrial fibrillation on apixaban presents with 3-week worsening of bilateral lower extremity edema.     Patient states that there legs have been swollen for the past 3 weeks, has worsened over that time period.  Sent to the ED by PCP for concern of possible DVT.     In the ED, BP 140s to 160s/60s to 80s, HR 80s to 90s, RR 15-18, afebrile, saturating 82% on room air requiring 2 L nasal cannula.  Received Lasix 20 mg IV x 1.  WBC 9.0, hemoglobin 14.8, , sodium 140, potassium 3.8, bicarb 28, anion gap 11, BUN 28, creatinine 0.56, troponin 16, NT proBNP 591.  Chest x-ray showing bibasilar atelectasis.  Left lower extremity DVT ultrasound without DVT. EKG NSR HR 88, QTc 470, without ST changes.    Interval Problem Update  3/29/2025  Patient was seen and examined on the telemetry floor.  She continues on continuous cardiac monitoring.  Currently on 1 LPM oxygen by nasal cannula weaning down from 2 LPM.  She was hypoxic down to 82% on room air in the emergency room.  Baseline is room air  Urine output was 1.3 L yesterday  NT-proBNP mildly elevated at 591.   I have increased patient's furosemide to 40 mg IV twice daily.  Continue hydrochlorothiazide and valsartan  Holding amlodipine and Tenormin  Awaiting echocardiogram.      I have discussed this patient's plan of care and discharge plan at IDT rounds today with Case Management, Nursing, Nursing leadership, and other members of the IDT team.         Code Status  Full Code    Disposition  The patient is not medically cleared for discharge to home or a post-acute facility.  Anticipate discharge to: home with close outpatient follow-up    I have placed the appropriate orders  for post-discharge needs.    Review of Systems  ROS     Physical Exam  Temp:  [36 °C (96.8 °F)-37.1 °C (98.8 °F)] 36 °C (96.8 °F)  Pulse:  [72-94] 76  Resp:  [15-18] 17  BP: (123-168)/(60-86) 146/81  SpO2:  [82 %-98 %] 92 %    Physical Exam  Vitals and nursing note reviewed.   Constitutional:       General: She is sleeping.      Appearance: She is normal weight. She is ill-appearing. She is not diaphoretic.      Interventions: Nasal cannula in place.   HENT:      Head: Normocephalic and atraumatic.      Mouth/Throat:      Mouth: Mucous membranes are moist.      Pharynx: Oropharynx is clear. No oropharyngeal exudate.   Eyes:      General:         Right eye: No discharge.         Left eye: No discharge.      Conjunctiva/sclera: Conjunctivae normal.      Pupils: Pupils are equal, round, and reactive to light.   Cardiovascular:      Rate and Rhythm: Normal rate and regular rhythm.      Pulses: Normal pulses.      Heart sounds: Normal heart sounds. No murmur heard.  Pulmonary:      Effort: Pulmonary effort is normal. No respiratory distress.      Breath sounds: Normal breath sounds.   Abdominal:      General: Abdomen is flat. Bowel sounds are normal. There is no distension.      Palpations: Abdomen is soft.      Tenderness: There is no abdominal tenderness.   Musculoskeletal:         General: Swelling and tenderness (Bilateral lower extremities) present.      Cervical back: Neck supple. No tenderness.      Right lower leg: 3+ Pitting Edema present.      Left lower leg: 3+ Pitting Edema present.   Neurological:      Mental Status: She is oriented to person, place, and time and easily aroused.      Motor: No weakness.   Psychiatric:         Thought Content: Thought content normal.         Judgment: Judgment normal.         Fluids    Intake/Output Summary (Last 24 hours) at 3/29/2025 1247  Last data filed at 3/29/2025 0900  Gross per 24 hour   Intake 1000 ml   Output 2250 ml   Net -1250 ml        Laboratory  Recent Labs      03/28/25  1731 03/29/25  0015   WBC 9.0 8.8   RBC 4.69 4.24   HEMOGLOBIN 14.8 13.2   HEMATOCRIT 43.8 39.1   MCV 93.4 92.2   MCH 31.6 31.1   MCHC 33.8 33.8   RDW 44.6 42.6   PLATELETCT 287 276   MPV 10.2 10.5     Recent Labs     03/28/25  1731 03/29/25  0015   SODIUM 140 142   POTASSIUM 3.8 3.1*   CHLORIDE 101 99   CO2 28 31   GLUCOSE 133* 148*   BUN 28* 26*   CREATININE 0.56 0.65   CALCIUM 9.5 8.9                   Imaging  EC-ECHOCARDIOGRAM COMPLETE W/O CONT   Final Result      US-EXTREMITY VENOUS LOWER UNILAT LEFT   Final Result      DX-CHEST-PORTABLE (1 VIEW)   Final Result      Mild bibasilar atelectasis versus infiltrate.           Assessment/Plan  * New onset of congestive heart failure (HCC)- (present on admission)  Assessment & Plan  Patient with 3-week history of worsening bilateral lower extremity edema.  Troponin 16, NT proBNP 591.  Chest x-ray showing bibasilar atelectasis.  Left lower extremity DVT ultrasound without DVT. EKG NSR HR 88, QTc 470, without ST changes.  TTE 3/18/2024 EF 75%, normal diastolic function, mildly dilated LA, trace MR, mild TR, RVSP 55 mmHg.     -Telemetry  -Daily weights and I&Os  -Low sodium diet with 2L fluid restriction  -Order D-dimer  -Order TTE  -Order TSH/FT4, alcohol level, UDS  -Lasix 20mg IV bid  -K>4, Mg>2    3/29/2025  Currently on 1 LPM oxygen by nasal cannula weaning down from 2 LPM.  She was hypoxic down to 82% on room air in the emergency room.  Baseline is room air  Urine output was 1.3 L yesterday  NT-proBNP mildly elevated at 591.   I have increased patient's furosemide to 40 mg IV twice daily.  Continue hydrochlorothiazide and valsartan  Holding amlodipine and Tenormin  Awaiting echocardiogram.  Continuous cardiac monitoring during forced diuresis to monitor for arrhythmias.    Bilateral lower extremity edema- (present on admission)  Assessment & Plan  3/29/2025  I have discontinued patient's amlodipine.  Awaiting echocardiogram  Continue IV forced  diuresis    Acute hypoxemic respiratory failure (HCC)- (present on admission)  Assessment & Plan  Saturating 82% on room air requiring 2 L nasal cannula, with respiratory rate up to 18. Troponin 16, NT proBNP 591.  Chest x-ray showing bibasilar atelectasis.  Left lower extremity DVT ultrasound without DVT. EKG NSR HR 88, QTc 470, without ST changes    -Order COVID/flu/RSV and D-dimer  -IV Lasix for likely new onset CHF  -RT protocol  -Supplemental O2 as needed  -Wean oxygen as tolerated  -Incentive spirometry    3/29/2025  Weaning down to 2 LPM.  I have ordered PEP therapy.  Continue forced diuresis with increased furosemide 40 mg IV twice daily.  Continue hydrochlorothiazide.   Continuous cardiac monitoring during forced diuresis to monitor for arrhythmias.      Paroxysmal atrial fibrillation (HCC)- (present on admission)  Assessment & Plan  Not in RVR.    -Continue home atenolol and apixaban    Primary hypertension- (present on admission)  Assessment & Plan  -Continue home valsartan, HCTZ, atenolol, amlodipine  -As needed hydralazine    Hyperlipidemia- (present on admission)  Assessment & Plan  -Continue home simvastatin    Type 2 diabetes mellitus without complication (HCC)- (present on admission)  Assessment & Plan  -Hypoglycemia protocol  -SSI    3/29/2025  Controlled with minimal lispro insulin sign scale    Acquired hypothyroidism- (present on admission)  Assessment & Plan  -Continue home levothyroxine  -Order TSH    3/29/2025  TSH is low at 0.88.  I decreased patient's levothyroxine to 50 mcg daily.         VTE prophylaxis:    therapeutic anticoagulation with eliquis 5 mg BID      I have performed a physical exam and reviewed and updated ROS and Plan today (3/29/2025). In review of yesterday's note (3/28/2025), there are no changes except as documented above.      Patient is medically complex and high risk of deterioration and death.

## 2025-03-29 NOTE — HOSPITAL COURSE
Patient is a 89-year-old female past medical history of hypothyroidism, NIDDM 2, HLD, HTN, paroxysmal atrial fibrillation on apixaban presents with 3-week worsening of bilateral lower extremity edema.     Patient states that there legs have been swollen for the past 3 weeks, has worsened over that time period.  Sent to the ED by PCP for concern of possible DVT.     In the ED, BP 140s to 160s/60s to 80s, HR 80s to 90s, RR 15-18, afebrile, saturating 82% on room air requiring 2 L nasal cannula.  Received Lasix 20 mg IV x 1.  WBC 9.0, hemoglobin 14.8, , sodium 140, potassium 3.8, bicarb 28, anion gap 11, BUN 28, creatinine 0.56, troponin 16, NT proBNP 591.  Chest x-ray showing bibasilar atelectasis.  Left lower extremity DVT ultrasound without DVT. EKG NSR HR 88, QTc 470, without ST changes.

## 2025-03-29 NOTE — PROGRESS NOTES
Pt arrived to T 805, A&O 4, on 2L NC. Pt denies pain at this time. Placed on tele monitor and monitor room called. NSR on the monitor. Pt oriented to unit and call light system. Bed frame alarm turned on and pt has call light and belongings within reach and bed in low locked position.

## 2025-03-29 NOTE — ASSESSMENT & PLAN NOTE
3/29/2025  I have discontinued patient's amlodipine.  Awaiting echocardiogram  Continue IV forced diuresis    3/30/2025  Continue IV forced diuresis  Left lower extremity DVT ultrasound negative for DVT.

## 2025-03-30 ENCOUNTER — APPOINTMENT (OUTPATIENT)
Dept: RADIOLOGY | Facility: MEDICAL CENTER | Age: OVER 89
DRG: 291 | End: 2025-03-30
Payer: MEDICARE

## 2025-03-30 PROBLEM — I50.31 ACUTE HEART FAILURE WITH PRESERVED EJECTION FRACTION (HFPEF, >= 50%) (HCC): Status: ACTIVE | Noted: 2025-03-28

## 2025-03-30 PROBLEM — E87.6 HYPOKALEMIA: Status: ACTIVE | Noted: 2025-03-30

## 2025-03-30 LAB
ALBUMIN SERPL BCP-MCNC: 3.8 G/DL (ref 3.2–4.9)
ALBUMIN/GLOB SERPL: 1.1 G/DL
ALP SERPL-CCNC: 60 U/L (ref 30–99)
ALT SERPL-CCNC: 11 U/L (ref 2–50)
ANION GAP SERPL CALC-SCNC: 11 MMOL/L (ref 7–16)
AST SERPL-CCNC: 22 U/L (ref 12–45)
BILIRUB SERPL-MCNC: 0.5 MG/DL (ref 0.1–1.5)
BUN SERPL-MCNC: 29 MG/DL (ref 8–22)
CALCIUM ALBUM COR SERPL-MCNC: 9.1 MG/DL (ref 8.5–10.5)
CALCIUM SERPL-MCNC: 8.9 MG/DL (ref 8.5–10.5)
CHLORIDE SERPL-SCNC: 101 MMOL/L (ref 96–112)
CO2 SERPL-SCNC: 28 MMOL/L (ref 20–33)
CREAT SERPL-MCNC: 0.8 MG/DL (ref 0.5–1.4)
ERYTHROCYTE [DISTWIDTH] IN BLOOD BY AUTOMATED COUNT: 45.3 FL (ref 35.9–50)
GFR SERPLBLD CREATININE-BSD FMLA CKD-EPI: 69 ML/MIN/1.73 M 2
GLOBULIN SER CALC-MCNC: 3.5 G/DL (ref 1.9–3.5)
GLUCOSE BLD STRIP.AUTO-MCNC: 144 MG/DL (ref 65–99)
GLUCOSE BLD STRIP.AUTO-MCNC: 160 MG/DL (ref 65–99)
GLUCOSE BLD STRIP.AUTO-MCNC: 207 MG/DL (ref 65–99)
GLUCOSE SERPL-MCNC: 117 MG/DL (ref 65–99)
HCT VFR BLD AUTO: 44.9 % (ref 37–47)
HGB BLD-MCNC: 14.4 G/DL (ref 12–16)
MAGNESIUM SERPL-MCNC: 2.2 MG/DL (ref 1.5–2.5)
MCH RBC QN AUTO: 30.4 PG (ref 27–33)
MCHC RBC AUTO-ENTMCNC: 32.1 G/DL (ref 32.2–35.5)
MCV RBC AUTO: 94.9 FL (ref 81.4–97.8)
PLATELET # BLD AUTO: 285 K/UL (ref 164–446)
PMV BLD AUTO: 10.1 FL (ref 9–12.9)
POTASSIUM SERPL-SCNC: 4.5 MMOL/L (ref 3.6–5.5)
PROT SERPL-MCNC: 7.3 G/DL (ref 6–8.2)
RBC # BLD AUTO: 4.73 M/UL (ref 4.2–5.4)
SODIUM SERPL-SCNC: 140 MMOL/L (ref 135–145)
WBC # BLD AUTO: 8.6 K/UL (ref 4.8–10.8)

## 2025-03-30 PROCEDURE — 700111 HCHG RX REV CODE 636 W/ 250 OVERRIDE (IP): Mod: JZ | Performed by: STUDENT IN AN ORGANIZED HEALTH CARE EDUCATION/TRAINING PROGRAM

## 2025-03-30 PROCEDURE — 700111 HCHG RX REV CODE 636 W/ 250 OVERRIDE (IP): Mod: JZ

## 2025-03-30 PROCEDURE — 80053 COMPREHEN METABOLIC PANEL: CPT

## 2025-03-30 PROCEDURE — 85027 COMPLETE CBC AUTOMATED: CPT

## 2025-03-30 PROCEDURE — 97163 PT EVAL HIGH COMPLEX 45 MIN: CPT

## 2025-03-30 PROCEDURE — 700102 HCHG RX REV CODE 250 W/ 637 OVERRIDE(OP): Performed by: STUDENT IN AN ORGANIZED HEALTH CARE EDUCATION/TRAINING PROGRAM

## 2025-03-30 PROCEDURE — 36415 COLL VENOUS BLD VENIPUNCTURE: CPT

## 2025-03-30 PROCEDURE — 770020 HCHG ROOM/CARE - TELE (206)

## 2025-03-30 PROCEDURE — 94669 MECHANICAL CHEST WALL OSCILL: CPT

## 2025-03-30 PROCEDURE — 82962 GLUCOSE BLOOD TEST: CPT | Mod: 91

## 2025-03-30 PROCEDURE — 97162 PT EVAL MOD COMPLEX 30 MIN: CPT

## 2025-03-30 PROCEDURE — 83735 ASSAY OF MAGNESIUM: CPT

## 2025-03-30 PROCEDURE — 99233 SBSQ HOSP IP/OBS HIGH 50: CPT | Performed by: STUDENT IN AN ORGANIZED HEALTH CARE EDUCATION/TRAINING PROGRAM

## 2025-03-30 PROCEDURE — A9270 NON-COVERED ITEM OR SERVICE: HCPCS | Performed by: STUDENT IN AN ORGANIZED HEALTH CARE EDUCATION/TRAINING PROGRAM

## 2025-03-30 PROCEDURE — 97166 OT EVAL MOD COMPLEX 45 MIN: CPT

## 2025-03-30 PROCEDURE — 73560 X-RAY EXAM OF KNEE 1 OR 2: CPT | Mod: LT

## 2025-03-30 PROCEDURE — 97535 SELF CARE MNGMENT TRAINING: CPT

## 2025-03-30 RX ORDER — IBUPROFEN 800 MG/1
800 TABLET, FILM COATED ORAL 3 TIMES DAILY PRN
Status: DISCONTINUED | OUTPATIENT
Start: 2025-04-02 | End: 2025-03-31 | Stop reason: HOSPADM

## 2025-03-30 RX ORDER — KETOROLAC TROMETHAMINE 15 MG/ML
15 INJECTION, SOLUTION INTRAMUSCULAR; INTRAVENOUS EVERY 6 HOURS
Status: DISCONTINUED | OUTPATIENT
Start: 2025-03-30 | End: 2025-03-31 | Stop reason: HOSPADM

## 2025-03-30 RX ORDER — ACETAMINOPHEN 500 MG
1000 TABLET ORAL EVERY 6 HOURS
Status: DISCONTINUED | OUTPATIENT
Start: 2025-03-30 | End: 2025-03-31 | Stop reason: HOSPADM

## 2025-03-30 RX ORDER — ACETAMINOPHEN 500 MG
1000 TABLET ORAL EVERY 6 HOURS PRN
Status: DISCONTINUED | OUTPATIENT
Start: 2025-04-04 | End: 2025-03-31 | Stop reason: HOSPADM

## 2025-03-30 RX ORDER — KETOROLAC TROMETHAMINE 15 MG/ML
15 INJECTION, SOLUTION INTRAMUSCULAR; INTRAVENOUS ONCE
Status: COMPLETED | OUTPATIENT
Start: 2025-03-30 | End: 2025-03-30

## 2025-03-30 RX ORDER — OXYCODONE HYDROCHLORIDE 5 MG/1
2.5 TABLET ORAL
Refills: 0 | Status: DISCONTINUED | OUTPATIENT
Start: 2025-03-30 | End: 2025-03-31 | Stop reason: HOSPADM

## 2025-03-30 RX ORDER — FUROSEMIDE 10 MG/ML
40 INJECTION INTRAMUSCULAR; INTRAVENOUS
Status: DISCONTINUED | OUTPATIENT
Start: 2025-03-31 | End: 2025-03-31 | Stop reason: HOSPADM

## 2025-03-30 RX ORDER — HYDROMORPHONE HYDROCHLORIDE 1 MG/ML
0.25 INJECTION, SOLUTION INTRAMUSCULAR; INTRAVENOUS; SUBCUTANEOUS
Status: DISCONTINUED | OUTPATIENT
Start: 2025-03-30 | End: 2025-03-31 | Stop reason: HOSPADM

## 2025-03-30 RX ORDER — FUROSEMIDE 10 MG/ML
20 INJECTION INTRAMUSCULAR; INTRAVENOUS ONCE
Status: COMPLETED | OUTPATIENT
Start: 2025-03-30 | End: 2025-03-30

## 2025-03-30 RX ORDER — OXYCODONE HYDROCHLORIDE 5 MG/1
5 TABLET ORAL
Refills: 0 | Status: DISCONTINUED | OUTPATIENT
Start: 2025-03-30 | End: 2025-03-31 | Stop reason: HOSPADM

## 2025-03-30 RX ORDER — FUROSEMIDE 10 MG/ML
60 INJECTION INTRAMUSCULAR; INTRAVENOUS
Status: DISCONTINUED | OUTPATIENT
Start: 2025-03-30 | End: 2025-03-30

## 2025-03-30 RX ADMIN — SIMVASTATIN 20 MG: 20 TABLET, FILM COATED ORAL at 05:11

## 2025-03-30 RX ADMIN — ACETAMINOPHEN 650 MG: 325 TABLET ORAL at 06:41

## 2025-03-30 RX ADMIN — HYDROCHLOROTHIAZIDE 25 MG: 25 TABLET ORAL at 05:11

## 2025-03-30 RX ADMIN — APIXABAN 5 MG: 5 TABLET, FILM COATED ORAL at 17:22

## 2025-03-30 RX ADMIN — INSULIN LISPRO 1 UNITS: 100 INJECTION, SOLUTION INTRAVENOUS; SUBCUTANEOUS at 17:22

## 2025-03-30 RX ADMIN — FUROSEMIDE 60 MG: 10 INJECTION, SOLUTION INTRAVENOUS at 15:27

## 2025-03-30 RX ADMIN — LEVOTHYROXINE SODIUM 50 MCG: 0.05 TABLET ORAL at 05:11

## 2025-03-30 RX ADMIN — FUROSEMIDE 20 MG: 10 INJECTION, SOLUTION INTRAVENOUS at 07:58

## 2025-03-30 RX ADMIN — APIXABAN 5 MG: 5 TABLET, FILM COATED ORAL at 05:11

## 2025-03-30 RX ADMIN — INSULIN LISPRO 2 UNITS: 100 INJECTION, SOLUTION INTRAVENOUS; SUBCUTANEOUS at 11:40

## 2025-03-30 RX ADMIN — VALSARTAN 160 MG: 80 TABLET ORAL at 05:11

## 2025-03-30 RX ADMIN — DORZOLAMIDE HYDROCHLORIDE AND TIMOLOL MALEATE 1 DROP: 20; 5 SOLUTION/ DROPS OPHTHALMIC at 17:26

## 2025-03-30 RX ADMIN — ACETAMINOPHEN 1000 MG: 500 TABLET ORAL at 07:54

## 2025-03-30 RX ADMIN — SENNOSIDES AND DOCUSATE SODIUM 2 TABLET: 50; 8.6 TABLET ORAL at 17:22

## 2025-03-30 RX ADMIN — FUROSEMIDE 40 MG: 10 INJECTION, SOLUTION INTRAVENOUS at 05:12

## 2025-03-30 RX ADMIN — DORZOLAMIDE HYDROCHLORIDE AND TIMOLOL MALEATE 1 DROP: 20; 5 SOLUTION/ DROPS OPHTHALMIC at 05:12

## 2025-03-30 RX ADMIN — KETOROLAC TROMETHAMINE 15 MG: 15 INJECTION, SOLUTION INTRAMUSCULAR; INTRAVENOUS at 04:22

## 2025-03-30 RX ADMIN — KETOROLAC TROMETHAMINE 15 MG: 15 INJECTION, SOLUTION INTRAMUSCULAR; INTRAVENOUS at 07:55

## 2025-03-30 ASSESSMENT — FIBROSIS 4 INDEX: FIB4 SCORE: 2.14

## 2025-03-30 ASSESSMENT — PAIN DESCRIPTION - PAIN TYPE
TYPE: ACUTE PAIN

## 2025-03-30 ASSESSMENT — COGNITIVE AND FUNCTIONAL STATUS - GENERAL
SUGGESTED CMS G CODE MODIFIER MOBILITY: CL
TOILETING: A LITTLE
STANDING UP FROM CHAIR USING ARMS: A LOT
TURNING FROM BACK TO SIDE WHILE IN FLAT BAD: A LITTLE
WALKING IN HOSPITAL ROOM: TOTAL
SUGGESTED CMS G CODE MODIFIER DAILY ACTIVITY: CK
MOVING FROM LYING ON BACK TO SITTING ON SIDE OF FLAT BED: A LOT
CLIMB 3 TO 5 STEPS WITH RAILING: TOTAL
HELP NEEDED FOR BATHING: A LOT
DRESSING REGULAR LOWER BODY CLOTHING: A LOT
DAILY ACTIVITIY SCORE: 17
MOBILITY SCORE: 11
MOVING TO AND FROM BED TO CHAIR: A LOT
DRESSING REGULAR UPPER BODY CLOTHING: A LITTLE
PERSONAL GROOMING: A LITTLE

## 2025-03-30 ASSESSMENT — ACTIVITIES OF DAILY LIVING (ADL): TOILETING: INDEPENDENT

## 2025-03-30 ASSESSMENT — GAIT ASSESSMENTS: GAIT LEVEL OF ASSIST: UNABLE TO PARTICIPATE

## 2025-03-30 NOTE — PROGRESS NOTES
Bedside report received from off going RN/tech: Peyton assumed care of patient.     Fall Risk Score: HIGH RISK  Fall risk interventions in place: Place yellow fall risk ID band on patient, Provide patient/family education based on risk assessment, Educate patient/family to call staff for assistance when getting out of bed, Place fall precaution signage outside patient door, Utilize bed/chair fall alarm, Notify charge of high risk for huddle, and Bed alarm connected correctly  Bed type: Low air loss (Dell Score less than 17 interventions in place)  Patient on cardiac monitor: Yes  IVF/IV medications: Not Applicable   Oxygen: How many liters 1L  Bedside sitter: Not Applicable   Isolation: Not applicable

## 2025-03-30 NOTE — THERAPY
Occupational Therapy   Initial Evaluation     Patient Name: Camelia Frederick  Age:  89 y.o., Sex:  female  Medical Record #: [id number]  Today's Date: 3/30/2025    Precautions: Fall Risk    Assessment  Patient is 89 y.o. female admitted with 3-week worsening BLE edema and concern for DVT.  Left lower extremity DVT ultrasound without DVT. Diagnosed with new onset CHF and acute hypoxemic respiratory failure. PMHx of  hypothyroidism, NIDDM 2, HLD, HTN, and paroxysmal atrial fibrillation. Pt seen for OT eval. Pt currently resides alone on the ground floor of a 2-story ADA accessible home. Pt reported that she was independent with ADLs and IADLs.    During OT eval, pt presented with pain as well as deficits in self-care tasks, balance, functional mobility, cognition, strength, and activity tolerance. She required CGA-min A to complete ADLs, functional mobility, and txfs. Pt initially used FWW and then preferred to use SPC back to bed. Pt with questionable memory deficits following discussion with PT in regards to performance. Pt was unable to recall seeing PT and denied having a fall after previously disclosing information to PT. Will continue to follow for ongoing acute OT services and update DC recommendations appropriately.      Plan    Occupational Therapy Initial Treatment Plan   Treatment Interventions: Self Care / Activities of Daily Living, Adaptive Equipment, Neuro Re-Education / Balance, Therapeutic Exercises, Therapeutic Activity  Treatment Frequency: 3 Times per Week  Duration: Until Therapy Goals Met    DC Equipment Recommendations: Unable to determine at this time  Discharge Recommendations:  (Post acute vs HH)      Objective    Prior Living Situation   Prior Services Home-Independent   Housing / Facility 2 Story House   Steps Into Home 2   Steps In Home   (Full flight, however, pt reported thaat she does not need to access upper level.)   Bathroom Set up Walk In Shower;Grab Bars;Shower Chair    Equipment Owned Front-Wheel Walker;Single Point Cane;Tub / Shower Seat;Grab Bar(s) In Tub / Shower;Grab Bar(s) By Toilet   Lives with - Patient's Self Care Capacity Alone and Able to Care For Self   Comments Pt currently resides alone and reported that her home was ADA accessible from when she cared for her brother. Pt reported having great support from her friends and neighbors and plans to DC to her friend's house.   Prior Level of ADL Function   Self Feeding Independent   Grooming / Hygiene Independent   Bathing Independent   Dressing Independent   Toileting Independent   Prior Level of IADL Function   Medication Management Independent   Laundry Independent   Kitchen Mobility Independent   Finances Independent   Home Management Independent   Shopping Requires Assist  (Has groceries delivered)   Prior Level Of Mobility Independent With Device in Community;Independent With Device in Home   Driving / Transportation Relatives / Others Provide Transportation  (Has not driven since her TIA. Reported that her friends and neighbors help as needed.)   History of Falls   Date of Last Fall   (Pt denied having any recent falls which differs from what she told PT previously. Pt has previously told PT that she had fallen last night (3/29) after her LLE gave out when transferring to chair. No documentation of a fall in chart)   Vitals   O2 Delivery Device Room air w/o2 available   Vitals Comments Pt found up sitting at the EOB with no O2 on. Denied O2 use at baseline   Pain 0 - 10 Group   Therapist Pain Assessment Post Activity Pain Same as Prior to Activity;Nurse Notified  (Not rated, reported a mild increse in Left knee pain with activity)   Cognition    Cognition / Consciousness X   Level of Consciousness Alert   Safety Awareness Impaired   New Learning Impaired   Comments Pleasant and cooperative; questionable memory deficits following discussion with PT in regards to performance. Pt was unable to recall seeing PT and  denied having a fall after previously disclosing information to PT   Active ROM Upper Body   Active ROM Upper Body  WDL   Dominant Hand Right   Comments Observed during functional tasks   Strength Upper Body   Upper Body Strength  X   Gross Strength Generalized Weakness, Equal Bilaterally.    Balance Assessment   Sitting Balance (Static) Fair +   Sitting Balance (Dynamic) Fair   Standing Balance (Static) Fair   Standing Balance (Dynamic) Fair -   Weight Shift Sitting Fair   Weight Shift Standing Fair   Comments w/FWW and SPC   Bed Mobility    Comments Up to EOB pre and post   ADL Assessment   Eating Supervision   Grooming Contact Guard Assist;Standing  (Hand washing at sink)   Lower Body Dressing Moderate Assist  (Demo difficulty with tailor sitting)   Toileting Minimal Assist  (Assist with balance while completing pericare and clothing management after voiding on standard toilet)   How much help from another person does the patient currently need...   6 Clicks Daily Activity Score 17   Functional Mobility   Sit to Stand Contact Guard Assist   Bed, Chair, Wheelchair Transfer Minimal Assist   Toilet Transfers Minimal Assist   Mobility EB <> bathroom; w/FWW and SPC. Pt with no LOB   Activity Tolerance   Sitting in Chair 5 min (toilet)   Sitting Edge of Bed Up to EOB pre and post   Standing 5 min   Patient / Family Goals   Patient / Family Goal #1 To go home   Short Term Goals   Short Term Goal # 1 Pt will complete ADL txfs with supv   Short Term Goal # 2 Pt will complete LB dressing with supv using AE PRN   Short Term Goal # 3 Pt will complete toileting ADLs with supv   Short Term Goal # 4 Pt will complete standing g/h routine with supv   Education Group   Education Provided Role of Occupational Therapist;Activities of Daily Living   Role of Occupational Therapist Patient Response Patient;Acceptance;Explanation;Verbal Demonstration   ADL Patient Response Patient;Acceptance;Explanation;Verbal Demonstration;Action  Demonstration;Reinforcement Needed

## 2025-03-30 NOTE — CARE PLAN
"  Problem: Pain - Standard  Goal: Alleviation of pain or a reduction in pain to the patient’s comfort goal  Outcome: Progressing  Flowsheets (Taken 3/30/2025 3273)  OB Pain Intervention:   Rest   Repositioned   Relaxation technique   Medication - See MAR  Pain Rating Scale (NPRS): 2  Note: Pt assessed for pain Q4h and medicated PRN. Pt instructed to notify RN of any new or increasing pain to prevent it from becoming intolerable. Verbalized understanding.       Problem: Knowledge Deficit - Standard  Goal: Patient and family/care givers will demonstrate understanding of plan of care, disease process/condition, diagnostic tests and medications  Outcome: Progressing  Note: Pt educated regarding plan of care for the day, activity, diet, and medications. Verbalized understanding.       Problem: Fall Risk  Goal: Patient will remain free from falls  Outcome: Progressing  Note: Fall risk assessed and fall precautions in place, pt 1 person  assist. Bed alarm on, appropriate signs in place, call light and personal belongings within reach. Pt educated to call for help and not get up without assistance. Verbalized understanding.      Problem: Care Map:  Day 2 Optimal Outcome for the Heart Failure Patient  Goal: Day 2:  Optimal Care of the heart failure patient  Outcome: Progressing   The patient is Stable - Low risk of patient condition declining or worsening    Shift Goals  Clinical Goals: Monitor VS and labs; pain management ; safety from fall  Patient Goals: pain control  Family Goals: ROSALVA    Progress made toward(s) clinical / shift goals:   patient weaned from O2 and tolerating fairly well, call light within reach and bed alarm kept on,BP (!) 154/84   Pulse 74   Temp 36.6 °C (97.9 °F) (Temporal)   Resp 18   Ht 1.626 m (5' 4\")   Wt 66.2 kg (145 lb 15.1 oz)   SpO2 93%         "

## 2025-03-30 NOTE — PROGRESS NOTES
Monitor Summary  Rhythm: Sr  Rate: 62 - 76  Ectopy: 5 and 4 Beats Vtach, r PVC, r PAC  .15 / .08 / .43

## 2025-03-30 NOTE — CARE PLAN
The patient is Stable - Low risk of patient condition declining or worsening    Shift Goals  Clinical Goals: I/O's, Monitor Vitals, Safety  Patient Goals: get some sleep, go home  Family Goals: ROSALVA    Progress made toward(s) clinical / shift goals:        Problem: Pain - Standard  Goal: Alleviation of pain or a reduction in pain to the patient’s comfort goal  Outcome: Progressing     Problem: Knowledge Deficit - Standard  Goal: Patient and family/care givers will demonstrate understanding of plan of care, disease process/condition, diagnostic tests and medications  Outcome: Progressing     Problem: Fall Risk  Goal: Patient will remain free from falls  Outcome: Progressing     Problem: Care Map:  Day 1 Optimal Outcome for the Heart Failure Patient  Goal: Day 1:  Optimal Care of the heart failure patient  Description: Target End Date:  end of day 1  Outcome: Progressing  Intervention: Start Heart Failure education booklet, provide writing utensils and notepad for questions  Note: Heart Failure booklet provided to patient along with writing utensil.   Intervention: For patient's with heart failure exacerbation, identify precipitant (diet, med compliance, etc.) and direct education towards lifestyle changes to prevent exacerbations.  Note: Pt educated on heart healthy, low sodium, and fluid restriction diet.   Intervention: Instruct patient to write down weights on symptom tracker daily  Note: Pt educated to write down daily weights.      Problem: Skin Integrity  Goal: Skin integrity is maintained or improved  Outcome: Progressing

## 2025-03-30 NOTE — THERAPY
"Physical Therapy   Initial Evaluation     Patient Name: Camelia Frederick  Age:  89 y.o., Sex:  female  Medical Record #: [id number]  Today's Date: 3/30/2025     Precautions  Precautions: Fall Risk    Assessment  Patient is 89 y.o. female presenting after being sent by PCP for worsening BLE edema x3 weeks. Pt found to have new onset CHF and acute hypoxemic respiratory failure. LLE DVT ultrasound without DVT. Xray left knee showing a large joint effusion. Pt with PMH including hypothyroidism, NIDDM 2, HLD, HTN, paroxysmal atrial fibrillation on apixaban. Pt is independent with functional mobility at baseline using no AD at home and SPC in the community. Lives in [address] alone. Pt reported that she can stay downstairs with access to bedroom and shower. Pt is considering moving into \"senior living\" or back to [place] to be closer to her kids.    During current session, pt presents with significant left knee pain despite being premedicated, memory deficits with unclear baseline, generalized weakness, impaired balance, and decreased endurance requiring overall min-max A for mobility as detailed below. Able to txfer from bed>recliner with FWW and mod-max A. Required multiple attempts to stand d/t left knee pain. Pt reported that she fell last night when standing up from the chair with nursing and her LLE \"gave out.\" No indication of this in chart. Notified RN however questionable accuracy given pt's memory deficits? Educated pt about compensatory strategies to facilitate mobility in setting of left knee pain as below. Encouraged pt to continue mobilizing up to the chair and BSC with nursing as tolerated. Recommend post-acute placement to address these significant functional deficits below baseline. Pt would benefit from continued acute PT services to improve functional mobility prior to d/c.    Plan    Physical Therapy Initial Treatment Plan   Treatment Plan : Bed Mobility, Equipment, Family / Caregiver Training, " "Gait Training, Manual Therapy, Neuro Re-Education / Balance, Self Care / Home Evaluation, Stair Training, Therapeutic Activities, Therapeutic Exercise  Treatment Frequency: 4 Times per Week  Duration: Until Therapy Goals Met    DC Equipment Recommendations: Unable to determine at this time  Discharge Recommendations: Recommend post-acute placement for additional physical therapy services prior to discharge home       Subjective    Pt received resting in bed, agreeable to participate.      Objective       03/30/25 0943   Initial Contact Note    Initial Contact Note Order Received and Verified, Physical Therapy Evaluation in Progress with Full Report to Follow.   Precautions   Precautions Fall Risk   Vitals   O2 (LPM) 1   O2 Delivery Device Nasal Cannula   Vitals Comments pt denied O2 use at baseline   Pain 0 - 10 Group   Therapist Pain Assessment Prior to Activity;During Activity;Post Activity Pain Same as Prior to Activity;Nurse Notified  (c/o significantly increased left knee pain with mobility. Pt was premedicated by RN)   Prior Living Situation   Prior Services None   Housing / Facility 2 Story House   Steps Into Home 2   Steps In Home   (FOS, however pt reported that she can stay downstairs with access to bedroom and shower. Downstairs is fully accessible since pt used to care for disabled brother)   Rail Both Rail (Steps into Home)   Equipment Owned Front-Wheel Walker;Single Point Cane   Lives with - Patient's Self Care Capacity Alone and Unable to Care For Self   Comments Lives in [place]. Her neighbor checks to make sure pt raises her blinds every morning. Reports that she has not driven for the last year so friends and neighbors drive her. Pt gets groceries delivered. Reports that she normally goes up/down the FOS at home for exercise 2x/day. Pt is considering moving into \"senior living\" or back to [place] to be closer to her kids   Prior Level of Functional Mobility   Bed Mobility Independent   Transfer " "Status Independent   Ambulation Independent   Ambulation Distance limited community   Assistive Devices Used None   Stairs Independent   Comments pt endorses using no AD at home and SPC in the community   History of Falls   Date of Last Fall   (pt reported that she fell last night when standing up from the chair with nursing and her LLE \"gave out.\" No indication of this in chart. Notified RN however questionable accuracy given pt's memory deficits?)   Cognition    Cognition / Consciousness X   Level of Consciousness Alert   Ability To Follow Commands 1 Step   Safety Awareness Impaired   New Learning Impaired   Comments pleasant and cooperative, memory deficits, limited by left knee pain   Passive ROM Lower Body   Passive ROM Lower Body X   Comments left knee grossly limited by pain   Active ROM Lower Body    Active ROM Lower Body  X   Comments see above   Strength Lower Body   Lower Body Strength  X   Comments see above, able to txfer with assist   Sensation Lower Body   Comments no c/o altered sensation BLE   Coordination Lower Body    Coordination Lower Body  X   Comments slow, weak, shuffled. LLE also limited by pain   Vision   Vision Comments pt endorsed impaired left eye vision since surgery and resulting blood clot 4-5 yrs ago   Balance Assessment   Sitting Balance (Static) Fair   Sitting Balance (Dynamic) Fair   Standing Balance (Static) Poor   Standing Balance (Dynamic) Poor -   Weight Shift Sitting Fair   Weight Shift Standing Poor   Comments FWW   Bed Mobility    Supine to Sit Minimal Assist   Scooting Minimal Assist   Comments HOBE, up in recliner post   Gait Analysis   Gait Level Of Assist Unable to Participate   Functional Mobility   Sit to Stand Maximal Assist   Bed, Chair, Wheelchair Transfer Moderate Assist   Transfer Method Stand Step   Mobility bed>recliner with FWW   Comments required multiple attempts with cues for hand placement to stand, pt was limited primarily by left knee pain. Eventually " successful with max A from elevated EOB to FWW   6 Clicks Assessment - How much HELP from from another person do you currently need... (If the patient hasn't done an activity recently, how much help from another person do you think he/she would need if he/she tried?)   Turning from your back to your side while in a flat bed without using bedrails? 3   Moving from lying on your back to sitting on the side of a flat bed without using bedrails? 2   Moving to and from a bed to a chair (including a wheelchair)? 2   Standing up from a chair using your arms (e.g., wheelchair, or bedside chair)? 2   Walking in hospital room? 1   Climbing 3-5 steps with a railing? 1   6 clicks Mobility Score 11   Patient / Family Goals    Patient / Family Goal #1 to decrease pain   Short Term Goals    Short Term Goal # 1 Pt will perform supine<>sit with SPV to progress bed mobility in 6 visits.   Short Term Goal # 2 Pt will perform sit<>stand and stand step txfer with FWW and SPV to progress OOB mobility in 6 visits.   Short Term Goal # 3 Pt will ambulate 150 ft with FWW and SPV to access home in 6 visits.   Short Term Goal # 4 Pt will navigate 2 stairs with BHR and SPV to access home in 6 visits.   Education Group   Education Provided Role of Physical Therapist   Role of Physical Therapist Patient Response Patient;Acceptance;Explanation;Demonstration;Verbal Demonstration;Action Demonstration;Reinforcement Needed   Additional Comments educated pt about compensatory strategies to facilitate mobility in setting of left knee pain, pt agreeable with multiple cues d/t memory deficits   Physical Therapy Initial Treatment Plan    Treatment Plan  Bed Mobility;Equipment;Family / Caregiver Training;Gait Training;Manual Therapy;Neuro Re-Education / Balance;Self Care / Home Evaluation;Stair Training;Therapeutic Activities;Therapeutic Exercise   Treatment Frequency 4 Times per Week   Duration Until Therapy Goals Met   Problem List    Problems  Pain;Impaired Bed Mobility;Impaired Transfers;Impaired Ambulation;Functional Strength Deficit;Impaired Balance;Impaired Vision;Decreased Activity Tolerance;Safety Awareness Deficits / Cognition   Anticipated Discharge Equipment and Recommendations   DC Equipment Recommendations Unable to determine at this time   Discharge Recommendations Recommend post-acute placement for additional physical therapy services prior to discharge home   Interdisciplinary Plan of Care Collaboration   IDT Collaboration with  Nursing;Occupational Therapist   Patient Position at End of Therapy Seated;Chair Alarm On;Call Light within Reach;Tray Table within Reach;Phone within Reach   Collaboration Comments RN and OT updated   Session Information   Date / Session Number  3/30- 1(1/4, 4/5)

## 2025-03-30 NOTE — PROGRESS NOTES
Bedside report received from off going RN/tech: Perico assumed care of patient.     Fall Risk Score: HIGH RISK  Fall risk interventions in place: Place yellow fall risk ID band on patient, Provide patient/family education based on risk assessment, Educate patient/family to call staff for assistance when getting out of bed, Place fall precaution signage outside patient door, Utilize bed/chair fall alarm, Notify charge of high risk for huddle, and Bed alarm connected correctly  Bed type: Regular (Dell Score less than 17 interventions in place)  Patient on cardiac monitor: Yes  IVF/IV medications: Not Applicable   Oxygen: Room Air  Bedside sitter: Not Applicable   Isolation: Not applicable

## 2025-03-30 NOTE — DISCHARGE PLANNING
Case Management Discharge Planning    Admission Date: 3/28/2025  GMLOS: 3.9  ALOS: 2    6-Clicks ADL Score: 16  6-Clicks Mobility Score: 16    Anticipated Discharge Dispo:        Action(s) Taken: Referral(s) sent    Escalations Completed: None    Medically Clear: Yes    Next Steps: RNCM sent [facility] referrals per protocol.     Barriers to Discharge: Pending Placement and Transportation    Is the patient up for discharge tomorrow: Yes

## 2025-03-30 NOTE — PROGRESS NOTES
[facility] HOSPITALIST CROSS COVER    Notified by RN regarding patient complaining of severe left knee pain. Per nursing report, the knee is more swollen than the other, but isn't erythematous or hot to the touch. Patient is afebrile. Denied trauma. Xray showing a large joint effusion. One time dose of Toradol for pain.     Vitals:    03/30/25 0408   BP: (!) 152/106   Pulse: 67   Resp: 18   Temp: 36.6 °C (97.9 °F)   SpO2: 94%      ---------------------------------------------------------------------------------------------------------    Electronically signed by:  Elsy Perdomo, MADHU, APRN, YARAP-BC  Hospitalist Services

## 2025-03-30 NOTE — PROGRESS NOTES
Hospital Medicine Daily Progress Note    Date of Service  3/30/2025    Chief Complaint  Camelia Frederick is a 89 y.o. female admitted 3/28/2025 with Lower extremity edema.    Hospital Course  Patient is a 89-year-old female past medical history of hypothyroidism, NIDDM 2, HLD, HTN, paroxysmal atrial fibrillation on apixaban presents with 3-week worsening of bilateral lower extremity edema.     Patient states that there legs have been swollen for the past 3 weeks, has worsened over that time period.  Sent to the ED by PCP for concern of possible DVT.     In the ED, BP 140s to 160s/60s to 80s, HR 80s to 90s, RR 15-18, afebrile, saturating 82% on room air requiring 2 L nasal cannula.  Received Lasix 20 mg IV x 1.  WBC 9.0, hemoglobin 14.8, , sodium 140, potassium 3.8, bicarb 28, anion gap 11, BUN 28, creatinine 0.56, troponin 16, NT proBNP 591.  Chest x-ray showing bibasilar atelectasis.  Left lower extremity DVT ultrasound without DVT. EKG NSR HR 88, QTc 470, without ST changes.    Interval Problem Update  3/29/2025  Patient was seen and examined on the telemetry floor.  She continues on continuous cardiac monitoring.  Currently on 1 LPM oxygen by nasal cannula weaning down from 2 LPM.  She was hypoxic down to 82% on room air in the emergency room.  Baseline is room air  Urine output was 1.3 L yesterday  NT-proBNP mildly elevated at 591.   I have increased patient's furosemide to 40 mg IV twice daily.  Continue hydrochlorothiazide and valsartan  Holding amlodipine and Tenormin  Awaiting echocardiogram.    3/30/2025  Patient was seen and examined on the telemetry floor.  She continues on continuous cardiac monitoring.  Continues to require 1 LPM oxygen by nasal cannula.  Lower extremity edema improving.  PT recommending postacute placement.  Skilled nursing facility referral placed.  Increasing furosemide 60 mg IV to twice daily.  Continue hydrochlorothiazide 25 mg daily.  Urine output of 2.6 L yesterday.    Potassium of 4.5 with creatinine of 0.8.  Echocardiogram showing ejection fraction of 65% with RVSP of 35 mmHg.  Mild aortic insufficiency with mild MAC and MR.  No significant valvular disease.    I have discussed this patient's plan of care and discharge plan at IDT rounds today with Case Management, Nursing, Nursing leadership, and other members of the IDT team.         Code Status  Full Code    Disposition  The patient is not medically cleared for discharge to home or a post-acute facility.  Anticipate discharge to: skilled nursing facility    I have placed the appropriate orders for post-discharge needs.    Review of Systems  ROS     Physical Exam  Temp:  [36.3 °C (97.3 °F)-36.6 °C (97.9 °F)] 36.5 °C (97.7 °F)  Pulse:  [67-88] 88  Resp:  [17-18] 17  BP: (124-154)/() 128/67  SpO2:  [87 %-94 %] 90 %    Physical Exam  Vitals and nursing note reviewed.   Constitutional:       General: She is sleeping.      Appearance: She is normal weight. She is ill-appearing. She is not diaphoretic.      Interventions: Nasal cannula in place.   HENT:      Head: Normocephalic and atraumatic.      Mouth/Throat:      Mouth: Mucous membranes are moist.      Pharynx: Oropharynx is clear. No oropharyngeal exudate.   Eyes:      General:         Right eye: No discharge.         Left eye: No discharge.      Conjunctiva/sclera: Conjunctivae normal.      Pupils: Pupils are equal, round, and reactive to light.   Cardiovascular:      Rate and Rhythm: Normal rate and regular rhythm.      Pulses: Normal pulses.      Heart sounds: Normal heart sounds. No murmur heard.  Pulmonary:      Effort: Pulmonary effort is normal. No respiratory distress.      Breath sounds: Normal breath sounds.   Abdominal:      General: Abdomen is flat. Bowel sounds are normal. There is no distension.      Palpations: Abdomen is soft.      Tenderness: There is no abdominal tenderness.   Musculoskeletal:         General: Swelling and tenderness (Bilateral lower  extremities) present.      Cervical back: Neck supple. No tenderness.      Right lower leg: 3+ Pitting Edema present.      Left lower leg: 3+ Pitting Edema present.   Neurological:      Mental Status: She is oriented to person, place, and time and easily aroused.      Motor: No weakness.   Psychiatric:         Thought Content: Thought content normal.         Judgment: Judgment normal.         Fluids    Intake/Output Summary (Last 24 hours) at 3/30/2025 1618  Last data filed at 3/30/2025 1357  Gross per 24 hour   Intake 1520 ml   Output 1050 ml   Net 470 ml        Laboratory  Recent Labs     03/28/25  1731 03/29/25  0015 03/30/25  0117   WBC 9.0 8.8 8.6   RBC 4.69 4.24 4.73   HEMOGLOBIN 14.8 13.2 14.4   HEMATOCRIT 43.8 39.1 44.9   MCV 93.4 92.2 94.9   MCH 31.6 31.1 30.4   MCHC 33.8 33.8 32.1*   RDW 44.6 42.6 45.3   PLATELETCT 287 276 285   MPV 10.2 10.5 10.1     Recent Labs     03/28/25  1731 03/29/25  0015 03/30/25  0117   SODIUM 140 142 140   POTASSIUM 3.8 3.1* 4.5   CHLORIDE 101 99 101   CO2 28 31 28   GLUCOSE 133* 148* 117*   BUN 28* 26* 29*   CREATININE 0.56 0.65 0.80   CALCIUM 9.5 8.9 8.9                   Imaging  DX-KNEE 2- LEFT   Final Result         1. Large joint effusion.      2. No fracture.      3. Mild degenerative changes.      4. Heavy vascular calcification.                     EC-ECHOCARDIOGRAM COMPLETE W/O CONT   Final Result      US-EXTREMITY VENOUS LOWER UNILAT LEFT   Final Result      DX-CHEST-PORTABLE (1 VIEW)   Final Result      Mild bibasilar atelectasis versus infiltrate.           Assessment/Plan  * Acute heart failure with preserved ejection fraction (HFpEF, >= 50%) ([facility])- (present on admission)  Assessment & Plan  Patient with 3-week history of worsening bilateral lower extremity edema.  Troponin 16, NT proBNP 591.  Chest x-ray showing bibasilar atelectasis.  Left lower extremity DVT ultrasound without DVT. EKG NSR HR 88, QTc 470, without ST changes.  TTE 3/18/2024 EF 75%, normal  diastolic function, mildly dilated LA, trace MR, mild TR, RVSP 55 mmHg.     -Telemetry  -Daily weights and I&Os  -Low sodium diet with 2L fluid restriction  -Order D-dimer  -Order TTE  -Order TSH/FT4, alcohol level, UDS  -Lasix 20mg IV bid  -K>4, Mg>2    3/29/2025  Currently on 1 LPM oxygen by nasal cannula weaning down from 2 LPM.  She was hypoxic down to 82% on room air in the emergency room.  Baseline is room air  Urine output was 1.3 L yesterday  NT-proBNP mildly elevated at 591.   I have increased patient's furosemide to 40 mg IV twice daily.  Continue hydrochlorothiazide and valsartan  Holding amlodipine and Tenormin  Awaiting echocardiogram.  Continuous cardiac monitoring during forced diuresis to monitor for arrhythmias.    3/30/2025  Continues to require 1 LPM oxygen by nasal cannula.  Lower extremity edema improving.  PT recommending postacute placement.  Skilled nursing facility referral placed.  Increasing furosemide 60 mg IV to twice daily.  Continue hydrochlorothiazide 25 mg daily  Urine output of 2.6 L yesterday.   Potassium of 4.5 with creatinine of 0.8.  Echocardiogram showing ejection fraction of 65% with RVSP of 35 mmHg.  Mild aortic insufficiency with mild MAC and MR.  No significant valvular disease.  Continuous cardiac monitoring during forced diuresis to monitor for arrhythmias.      Bilateral lower extremity edema- (present on admission)  Assessment & Plan  3/29/2025  I have discontinued patient's amlodipine.  Awaiting echocardiogram  Continue IV forced diuresis    3/30/2025  Continue IV forced diuresis  Left lower extremity DVT ultrasound negative for DVT.    Acute hypoxemic respiratory failure (HCC)- (present on admission)  Assessment & Plan  Saturating 82% on room air requiring 2 L nasal cannula, with respiratory rate up to 18. Troponin 16, NT proBNP 591.  Chest x-ray showing bibasilar atelectasis.  Left lower extremity DVT ultrasound without DVT. EKG NSR HR 88, QTc 470, without ST  changes    -Order COVID/flu/RSV and D-dimer  -IV Lasix for likely new onset CHF  -RT protocol  -Supplemental O2 as needed  -Wean oxygen as tolerated  -Incentive spirometry    3/29/2025  Weaning down to 2 LPM.  I have ordered PEP therapy.  Continue forced diuresis with increased furosemide 40 mg IV twice daily.  Continue hydrochlorothiazide.   Continuous cardiac monitoring during forced diuresis to monitor for arrhythmias.    3/30/2025  Continues to require 1 LPM oxygen by nasal cannula  Increase furosemide to 60 mg IV twice daily  Continue home hydrochlorothiazide  Continuous cardiac monitoring during forced diuresis to monitor for arrhythmias.      Hypokalemia- (present on admission)  Assessment & Plan  3/30/2025  Resolved with replacement.    Paroxysmal atrial fibrillation (HCC)- (present on admission)  Assessment & Plan  Not in RVR.    -Continue home atenolol and apixaban    Primary hypertension- (present on admission)  Assessment & Plan  -Continue home valsartan, HCTZ, atenolol, amlodipine  -As needed hydralazine    Hyperlipidemia- (present on admission)  Assessment & Plan  -Continue home simvastatin    Type 2 diabetes mellitus without complication (HCC)- (present on admission)  Assessment & Plan  -Hypoglycemia protocol  -SSI    3/29/2025  Controlled with minimal lispro insulin sign scale    3/30/2025  Remains controlled  Continue lispro insulin sign scale    Acquired hypothyroidism- (present on admission)  Assessment & Plan  -Continue home levothyroxine  -Order TSH    3/29/2025  TSH is low at 0.88.  I decreased patient's levothyroxine to 50 mcg daily.         VTE prophylaxis:    therapeutic anticoagulation with eliquis 5 mg BID      I have performed a physical exam and reviewed and updated ROS and Plan today (3/30/2025). In review of yesterday's note (3/29/2025), there are no changes except as documented above.      Patient is medically complex and high risk of deterioration and death.

## 2025-03-30 NOTE — PROGRESS NOTES
Monitor Summary  Rhythm: NSR  Rate: 67 - 80  Ectopy: pvc's; pac's; 9 beats of vtach   .16 / .08 / .44

## 2025-03-31 VITALS
WEIGHT: 146.16 LBS | HEIGHT: 64 IN | DIASTOLIC BLOOD PRESSURE: 56 MMHG | OXYGEN SATURATION: 90 % | BODY MASS INDEX: 24.95 KG/M2 | RESPIRATION RATE: 17 BRPM | SYSTOLIC BLOOD PRESSURE: 106 MMHG | TEMPERATURE: 97.9 F | HEART RATE: 71 BPM

## 2025-03-31 PROBLEM — R54 AGE-RELATED PHYSICAL DEBILITY: Status: ACTIVE | Noted: 2025-03-31

## 2025-03-31 PROBLEM — E87.6 HYPOKALEMIA: Status: RESOLVED | Noted: 2025-03-30 | Resolved: 2025-03-31

## 2025-03-31 PROBLEM — I50.31 ACUTE HEART FAILURE WITH PRESERVED EJECTION FRACTION (HFPEF, >= 50%) (HCC): Status: RESOLVED | Noted: 2025-03-28 | Resolved: 2025-03-31

## 2025-03-31 PROBLEM — I27.20 PULMONARY HYPERTENSION (HCC): Status: ACTIVE | Noted: 2025-03-31

## 2025-03-31 PROBLEM — J96.01 ACUTE HYPOXEMIC RESPIRATORY FAILURE (HCC): Status: RESOLVED | Noted: 2025-03-28 | Resolved: 2025-03-31

## 2025-03-31 LAB
ANION GAP SERPL CALC-SCNC: 12 MMOL/L (ref 7–16)
ANION GAP SERPL CALC-SCNC: 13 MMOL/L (ref 7–16)
BUN SERPL-MCNC: 43 MG/DL (ref 8–22)
BUN SERPL-MCNC: 44 MG/DL (ref 8–22)
CALCIUM SERPL-MCNC: 8.5 MG/DL (ref 8.5–10.5)
CALCIUM SERPL-MCNC: 8.6 MG/DL (ref 8.5–10.5)
CHLORIDE SERPL-SCNC: 96 MMOL/L (ref 96–112)
CHLORIDE SERPL-SCNC: 99 MMOL/L (ref 96–112)
CO2 SERPL-SCNC: 27 MMOL/L (ref 20–33)
CO2 SERPL-SCNC: 28 MMOL/L (ref 20–33)
CREAT SERPL-MCNC: 1.31 MG/DL (ref 0.5–1.4)
CREAT SERPL-MCNC: 1.69 MG/DL (ref 0.5–1.4)
GFR SERPLBLD CREATININE-BSD FMLA CKD-EPI: 28 ML/MIN/1.73 M 2
GFR SERPLBLD CREATININE-BSD FMLA CKD-EPI: 38 ML/MIN/1.73 M 2
GLUCOSE BLD STRIP.AUTO-MCNC: 110 MG/DL (ref 65–99)
GLUCOSE SERPL-MCNC: 114 MG/DL (ref 65–99)
GLUCOSE SERPL-MCNC: 133 MG/DL (ref 65–99)
MAGNESIUM SERPL-MCNC: 2.3 MG/DL (ref 1.5–2.5)
POTASSIUM SERPL-SCNC: 4.2 MMOL/L (ref 3.6–5.5)
POTASSIUM SERPL-SCNC: 5.1 MMOL/L (ref 3.6–5.5)
SODIUM SERPL-SCNC: 137 MMOL/L (ref 135–145)
SODIUM SERPL-SCNC: 138 MMOL/L (ref 135–145)

## 2025-03-31 PROCEDURE — A9270 NON-COVERED ITEM OR SERVICE: HCPCS | Performed by: STUDENT IN AN ORGANIZED HEALTH CARE EDUCATION/TRAINING PROGRAM

## 2025-03-31 PROCEDURE — 82962 GLUCOSE BLOOD TEST: CPT

## 2025-03-31 PROCEDURE — 99239 HOSP IP/OBS DSCHRG MGMT >30: CPT | Performed by: STUDENT IN AN ORGANIZED HEALTH CARE EDUCATION/TRAINING PROGRAM

## 2025-03-31 PROCEDURE — 700105 HCHG RX REV CODE 258: Performed by: STUDENT IN AN ORGANIZED HEALTH CARE EDUCATION/TRAINING PROGRAM

## 2025-03-31 PROCEDURE — 80048 BASIC METABOLIC PNL TOTAL CA: CPT

## 2025-03-31 PROCEDURE — 700102 HCHG RX REV CODE 250 W/ 637 OVERRIDE(OP): Performed by: STUDENT IN AN ORGANIZED HEALTH CARE EDUCATION/TRAINING PROGRAM

## 2025-03-31 PROCEDURE — 83735 ASSAY OF MAGNESIUM: CPT

## 2025-03-31 PROCEDURE — 700111 HCHG RX REV CODE 636 W/ 250 OVERRIDE (IP): Mod: JZ | Performed by: STUDENT IN AN ORGANIZED HEALTH CARE EDUCATION/TRAINING PROGRAM

## 2025-03-31 PROCEDURE — 99223 1ST HOSP IP/OBS HIGH 75: CPT | Performed by: PHYSICAL MEDICINE & REHABILITATION

## 2025-03-31 PROCEDURE — 94669 MECHANICAL CHEST WALL OSCILL: CPT

## 2025-03-31 RX ORDER — SODIUM CHLORIDE 9 MG/ML
1000 INJECTION, SOLUTION INTRAVENOUS ONCE
Status: COMPLETED | OUTPATIENT
Start: 2025-03-31 | End: 2025-03-31

## 2025-03-31 RX ORDER — SODIUM CHLORIDE 9 MG/ML
INJECTION, SOLUTION INTRAVENOUS CONTINUOUS
Status: DISCONTINUED | OUTPATIENT
Start: 2025-03-31 | End: 2025-03-31 | Stop reason: HOSPADM

## 2025-03-31 RX ORDER — FUROSEMIDE 20 MG/1
20-40 TABLET ORAL
Qty: 60 TABLET | Refills: 2 | Status: SHIPPED | OUTPATIENT
Start: 2025-03-31

## 2025-03-31 RX ORDER — VALSARTAN 160 MG/1
160 TABLET ORAL DAILY
Qty: 30 TABLET | Refills: 2 | Status: SHIPPED | OUTPATIENT
Start: 2025-04-01 | End: 2026-05-06

## 2025-03-31 RX ORDER — LEVOTHYROXINE SODIUM 50 UG/1
50 TABLET ORAL
Qty: 30 TABLET | Refills: 2 | Status: SHIPPED | OUTPATIENT
Start: 2025-04-01

## 2025-03-31 RX ADMIN — APIXABAN 5 MG: 5 TABLET, FILM COATED ORAL at 04:48

## 2025-03-31 RX ADMIN — KETOROLAC TROMETHAMINE 15 MG: 15 INJECTION, SOLUTION INTRAMUSCULAR; INTRAVENOUS at 12:58

## 2025-03-31 RX ADMIN — ACETAMINOPHEN 1000 MG: 500 TABLET ORAL at 12:58

## 2025-03-31 RX ADMIN — SODIUM CHLORIDE: 9 INJECTION, SOLUTION INTRAVENOUS at 12:58

## 2025-03-31 RX ADMIN — DORZOLAMIDE HYDROCHLORIDE AND TIMOLOL MALEATE 1 DROP: 20; 5 SOLUTION/ DROPS OPHTHALMIC at 04:48

## 2025-03-31 RX ADMIN — SODIUM CHLORIDE 1000 ML: 9 INJECTION, SOLUTION INTRAVENOUS at 08:59

## 2025-03-31 RX ADMIN — LEVOTHYROXINE SODIUM 50 MCG: 0.05 TABLET ORAL at 04:48

## 2025-03-31 RX ADMIN — SIMVASTATIN 20 MG: 20 TABLET, FILM COATED ORAL at 04:47

## 2025-03-31 ASSESSMENT — PAIN DESCRIPTION - PAIN TYPE
TYPE: ACUTE PAIN
TYPE: ACUTE PAIN

## 2025-03-31 ASSESSMENT — FIBROSIS 4 INDEX: FIB4 SCORE: 2.14

## 2025-03-31 NOTE — DISCHARGE SUMMARY
Discharge Summary    CHIEF COMPLAINT ON ADMISSION  Chief Complaint   Patient presents with    Leg Swelling       Reason for Admission  Acute respiratory failure with hypoxia with concern for new onset heart failure    Admission Date  3/28/2025    CODE STATUS  Full Code    HPI & HOSPITAL COURSE  Camelia Frederick is a 89-year-old female who presented on 3/28/2025 with worsening lower extremity edema.  This a very pleasant woman with a history of hypothyroidism, diabetes mellitus type 2, hyperlipidemia, primary hypertension, and paroxysmal atrial fibrillation on apixaban.  She presented to her primary care provider with 3 weeks of worsening lower extremity edema that was progressively getting worse associated with significant amount of pain.    Patient is a 89-year-old female past medical history of hypothyroidism, NIDDM 2, HLD, HTN, paroxysmal atrial fibrillation on apixaban presents with 3-week worsening of bilateral lower extremity edema.  She initially presented to her primary care provider's office and was subsequently referred to the emergency room for further evaluation with ultrasound of the lower extremities to rule out deep vein thrombus.    In emergency room, patient was noted to be hypoxic down to 82% on room air requiring 2 LPM oxygen at rest,.  She received intravenous furosemide.  White count was normal at 9.0 with hemoglobin of 4 point 0.5.  Sodium 140.  NT-proBNP mildly elevated at 591.  Chest x-ray showed bibasilar atelectasis.  Left lower extremity ultrasound was negative for DVT.  EKG without ischemic changes.    Due to concern for new onset congestive heart failure and respiratory failure hypoxia, patient was admitted to the telemetry floor for further care and evaluation.      Patient was started on forced diuresis initially with furosemide 20 mg IV twice daily.  D-dimer was negative.  Patient was amlodipine and Tenormin was held.  Increased forced diuresis with furosemide 40 mg IV twice  daily with continuation of hydrochlorothiazide and valsartan.  She underwent an echocardiogram, which showed normal left ventricular and right ventricular systolic function.  Left ventricular ejection fraction of 65%.  Right ventricular systolic pressure estimated to be 35 mmHg consistent with mild primary hypertension.  Mild mitral annular calcification and mitral regurgitation with mild aortic insufficiency.  Compared to previous echocardiogram on 8/18/2024 with no significant changes.     Despite holding patient's Tenormin and amlodipine, patient had improving blood pressures with ongoing forced diuresis.   Total urine output of 4.9 L with fluid balance of -1.5 L.  Patient developed a mild acute kidney injury with creatinine increased to 1.69 from 0.8, which improved to 1.31 with IV fluid bolus.  She was able to wean off supplemental oxygen down to room air.  She had significant pain of her lower extremity edema.  Blood pressures were in the 100s to 110s/50s to 60s on the day of discharge.    Patient's TSH was notably low at 0.088.  Free T4 elevated at 1.79.  Her home levothyroxine dosing was decreased to 50 mcg daily for goal of 4-6 for her age group.    Patient was evaluated by occupational and physical therapy, recommended postacute placement.  Patient was also evaluated by physical medicine and rehabilitation, who felt that the patient would be a good candidate for inpatient rehabilitation.  However, the patient declined skilled nursing facility and inpatient rehabilitation hospitalization and lieu of going home.  She declined home health stating that she had some matters to take care of with her family.  She was discharged to home as per her request.    Therefore, she is discharged in good and stable condition to home with close outpatient follow-up.    The patient met 2-midnight criteria for an inpatient stay at the time of discharge.    Discharge Date  3/31/2025    FOLLOW UP ITEMS POST  DISCHARGE  -Follow-up with primary care provider in 3 to 5 days with labs.  Consider addition of spironolactone if persistent hypertension.    DISCHARGE DIAGNOSES  Principal Problem (Resolved):    Acute heart failure with preserved ejection fraction (HFpEF, >= 50%) (HCC) (POA: Yes)  Active Problems:    Bilateral lower extremity edema (POA: Yes)    Acquired hypothyroidism (POA: Yes)      Overview: Feels fine on generic levothyroxine. Last TSH normal.     Type 2 diabetes mellitus without complication (HCC) (POA: Yes)      Overview: Due for A1C but not full labs. Has enough meds. Had a retinal exam       recently.             Prior:            Her last A1c was 6.6 during a hospital visit 2 months ago.  Full       chemistries were done at that time and I will not redraw any now.  She has       enough metformin to last until December.    Hyperlipidemia (POA: Yes)      Overview:  Labs ordered, has enough medication.    Primary hypertension (POA: Yes)      Overview: Had trouble getting amlodipine refilled Good control      Has enough meds    Paroxysmal atrial fibrillation (HCC) (POA: Yes)      Overview: Stable and followed by cardiology. On Eliquis and tenormin.    Pulmonary hypertension (HCC) (POA: Yes)    Age-related physical debility (POA: Yes)  Resolved Problems:    Acute respiratory failure with hypoxia (HCC) (POA: Yes)    Hypokalemia (POA: Yes)      FOLLOW UP  No future appointments.  Kevin Bryson M.D.  [facility] [address] [address]  [phone]    Follow up in 3 day(s)        MEDICATIONS ON DISCHARGE     Medication List        START taking these medications        Instructions   furosemide 20 MG Tabs  Commonly known as: Lasix   Take 1-2 Tablets by mouth 1 time a day as needed (leg swelling).  Dose: 20-40 mg     valsartan 160 MG Tabs  Start taking on: April 1, 2025  Commonly known as: Diovan   Take 1 Tablet by mouth every day.  Dose: 160 mg            CHANGE how you take these medications         Instructions   levothyroxine 50 MCG Tabs  Start taking on: April 1, 2025  What changed:   medication strength  how much to take  Commonly known as: Synthroid   Take 1 Tablet by mouth every morning on an empty stomach.  Dose: 50 mcg     simvastatin 20 MG Tabs  What changed: when to take this  Commonly known as: Zocor   Take 1 Tablet by mouth every evening.  Dose: 20 mg            CONTINUE taking these medications        Instructions   apixaban 5mg Tabs  Commonly known as: Eliquis   Take 1 Tablet by mouth 2 times a day.  Dose: 5 mg     CITRACAL PO   Take 1 Tablet by mouth every morning.  Dose: 1 Tablet     dorzolamide-timolol 2-0.5 % Soln  Commonly known as: Cosopt   Administer 1 Drop into the left eye 2 times a day.  Dose: 1 Drop     metFORMIN 500 MG Tabs  Commonly known as: Glucophage   Take 1 Tablet by mouth every day.  Dose: 500 mg     multivitamin Tabs   Take 1 Tablet by mouth every morning.  Dose: 1 Tablet     SUPER B COMPLEX PO   Take 1 Tablet by mouth every morning.  Dose: 1 Tablet     VITAMIN C PO   Take 1 Tablet by mouth 4 times a day.  Dose: 1 Tablet            STOP taking these medications      amLODIPine 5 MG Tabs  Commonly known as: Norvasc     atenolol 50 MG Tabs  Commonly known as: Tenormin     valsartan-hydrochlorothiazide 160-25 MG per tablet  Commonly known as: Diovan-HCT              Allergies  Allergies   Allergen Reactions    Codeine Nausea    Latex Rash          Nickel Rash             DIET  Orders Placed This Encounter   Procedures    Diet Order Diet: Cardiac; Second Modifier: (optional): 2 Gram Sodium; Fluid modifications: (optional): 2000 ml Fluid Restriction     Standing Status:   Standing     Number of Occurrences:   1     Diet::   Cardiac [6]     Second Modifier: (optional):   2 Gram Sodium [7]     Fluid modifications: (optional):   2000 ml Fluid Restriction [11]       ACTIVITY  As tolerated.  Weight bearing as tolerated    CONSULTATIONS  Physical medicine and  rehabilitation    PROCEDURES  None    LABORATORY  Lab Results   Component Value Date    SODIUM 138 03/31/2025    POTASSIUM 5.1 03/31/2025    CHLORIDE 99 03/31/2025    CO2 27 03/31/2025    GLUCOSE 114 (H) 03/31/2025    BUN 44 (H) 03/31/2025    CREATININE 1.31 03/31/2025    CREATININE 0.7 04/19/2009        Lab Results   Component Value Date    WBC 8.6 03/30/2025    HEMOGLOBIN 14.4 03/30/2025    HEMATOCRIT 44.9 03/30/2025    PLATELETCT 285 03/30/2025        Total time of the discharge process 34 minutes.

## 2025-03-31 NOTE — CARE PLAN
The patient is Stable - Low risk of patient condition declining or worsening    Shift Goals  Clinical Goals: Safety, VSS  Patient Goals: go home, left alone  Family Goals: ROSALVA    Progress made toward(s) clinical / shift goals:        Problem: Pain - Standard  Goal: Alleviation of pain or a reduction in pain to the patient’s comfort goal  Description: Target End Date:  Prior to discharge or change in level of careDocument on Vitals flowsheet1.  Document pain using the appropriate pain scale per order or unit policy2.  Educate and implement non-pharmacologic comfort measures (i.e. relaxation, distraction, massage, cold/heat therapy, etc.)3.  Pain management medications as ordered4.  Reassess pain after pain med administration per policy5.  If opiods administered assess patient's response to pain medication is appropriate per POSS sedation scale6.  Follow pain management plan developed in collaboration with patient and interdisciplinary team (including palliative care or pain specialists if applicable)  Outcome: Progressing     Problem: Knowledge Deficit - Standard  Goal: Patient and family/care givers will demonstrate understanding of plan of care, disease process/condition, diagnostic tests and medications  Description: Target End Date:  1-3 days or as soon as patient condition allowsDocument in Patient Education1.  Patient and family/caregiver oriented to unit, equipment, visitation policy and means for communicating concern2.  Complete/review Learning Assessment3.  Assess knowledge level of disease process/condition, treatment plan, diagnostic tests and medications4.  Explain disease process/condition, treatment plan, diagnostic tests and medications  Outcome: Progressing     Problem: Fall Risk  Goal: Patient will remain free from falls  Description: Target End Date:  Prior to discharge or change in level of careDocument interventions on the Beverley Ricardo Fall Risk Assessment1.  Assess for fall risk factors2.   Implement fall precautions  Outcome: Progressing     Problem: Care Map:  Day 2 Optimal Outcome for the Heart Failure Patient  Goal: Day 2:  Optimal Care of the heart failure patient  Description: Target End Date:  end of day 2  Outcome: Progressing     Problem: Skin Integrity  Goal: Skin integrity is maintained or improved  Description: Target End Date:  Prior to discharge or change in level of careDocument interventions on Skin Risk/Dell flowsheet groups and corresponding LDA1.  Assess and monitor skin integrity, appearance and/or temperature2.  Assess risk factors for impaired skin integrity and/or pressures ulcers3.  Implement precautions to protect skin integrity in collaboration with interdisciplinary team4.  Implement pressure ulcer prevention protocol if at risk for skin breakdown5.  Confirm wound care consult if at risk for skin breakdown6.  Ensure patient use of pressure relieving devices  (Low air loss bed, waffle overlay, heel protectors, ROHO cushion, etc)  Outcome: Progressing          Doxepin Pregnancy And Lactation Text: This medication is Pregnancy Category C and it isn't known if it is safe during pregnancy. It is also excreted in breast milk and breast feeding isn't recommended.

## 2025-03-31 NOTE — CONSULTS
"    Physical Medicine and Rehabilitation Consultation          Date of initial consultation: 3/31/2025  Consulting provider:  Orlando Sewell M.D.   Reason for consultation: assess for acute inpatient rehab appropriateness  LOS: 3 Day(s)    Chief complaint: LE swelling    HPI: The patient is a 89 y.o. right hand dominant female with a past medical history of hypothyroidism, diabetes mellitus type 2, hyperlipidemia, hypertension, paroxysmal atrial fibrillation on apixaban;  who presented on 3/28/2025  4:34 PM with bilateral lower extremity edema for 3 weeks.  Patient was referred to the ED for workup, lower extremity ultrasound showed no DVT.  Patient was started on aggressive diuresis with IV Lasix twice daily.     The patient currently reports overall feeling okay.  She is receiving IV fluids at a bolus rate for 1 L.  She endorses bilateral lower extremity leg pain with palpation.  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  She uses a single-point cane at baseline.  Patient lives alone in a two-story home but can stand the first floor.  Her initial reaction is to discharge home, but she could be interested in rehab too.  She has not walked since admission.    ROS  Pertinent positives are mentioned in the HPI, all others reviewed and are negative.    Social Hx:  2 SH  2 NICHOLAS  With: Alone    THERAPY:  Restrictions: Fall risk  PT: Functional mobility   3/30: Max assist sit to stand, mod assist transfer.  No gait    OT: ADLs  3/30: Mod assist lower body dressing, min assist toileting    SLP:   None    IMAGING:  TTE 3/29/2025  Normal chamber sizes. Normal LV and RV systolic function. LVEF 65%   visually. RVSP estimated at 35 mmHg. Mild MAC and MR, mild AI. No   significant valular disease. Compared to the prior study on 8/18/24 no   significant changes.    PROCEDURES:  None    PMH:  Past Medical History:   Diagnosis Date    Anemia     \"In the past\"    Blood clotting disorder (HCC) 03/09/2018    Left eye 2 years ago.    Breath shortness " "03/09/2018    Occasional with exertion    Cataract     bilateral IOL    Cold 03/09/2018    Pt states feels like there may be phlem building up in her throat, feels like \"I might be coming down with something\".  Pt instructed to call Dr. Rasmussen's office if she develops a sore throat, cold and/or fever.  Pt verbalized an understanding.    Dental disorder     lower partial denture    Diabetes     oral medication    High cholesterol     Hypercholesteremia     Hypertension     Hypothyroid     thyroidectomy    Renal disorder 1990's    stones       PSH:  Past Surgical History:   Procedure Laterality Date    PENETRATING KERATOPLASTY Left 3/13/2018    Procedure: DESCEMETS STRIPPING ENDOTHELIAL KERATOPLASTY;  Surgeon: Guille Rasmussen M.D.;  Location: SURGERY SAME DAY [facility] ORS;  Service: Ophthalmology    PTOSIS REPAIR Bilateral 10/5/2016    Procedure: PTOSIS REPAIR upper lid (levator advanced set up);  Surgeon: Silvino Seay M.D.;  Location: SURGERY SURGICAL [facility] ORS;  Service:     EYE SURGERY Left 2015    Retinal repair \"blood clot removed\"    THYROIDECTOMY  1980's    LITHOTRIPSY  1980's    kidney stone    TONSILLECTOMY  1944    CATARACT EXTRACTION WITH IOL Bilateral 2015/2016       FHX:  Family History   Problem Relation Age of Onset    Heart Attack Neg Hx     Heart Disease Neg Hx        Medications:  Current Facility-Administered Medications   Medication Dose    NS infusion      Pharmacy Consult Request ...Pain Management Review 1 Each  1 Each    acetaminophen (Tylenol) tablet 1,000 mg  1,000 mg    Followed by    [START ON 4/4/2025] acetaminophen (Tylenol) tablet 1,000 mg  1,000 mg    ketorolac (Toradol) 15 MG/ML injection 15 mg  15 mg    Followed by    [START ON 4/2/2025] ibuprofen (Motrin) tablet 800 mg  800 mg    oxyCODONE immediate-release (Roxicodone) tablet 2.5 mg  2.5 mg    Or    oxyCODONE immediate-release (Roxicodone) tablet 5 mg  5 mg    Or    HYDROmorphone (Dilaudid) injection 0.25 mg  0.25 mg    diclofenac " "sodium (Voltaren) 1 % gel 4 g  4 g    [Held by provider] furosemide (Lasix) injection 40 mg  40 mg    levothyroxine (Synthroid) tablet 50 mcg  50 mcg    Respiratory Therapy Consult      senna-docusate (Pericolace Or Senokot S) 8.6-50 MG per tablet 2 Tablet  2 Tablet    And    polyethylene glycol/lytes (Miralax) Packet 1 Packet  1 Packet    hydrALAZINE (Apresoline) injection 10 mg  10 mg    ondansetron (Zofran) syringe/vial injection 4 mg  4 mg    Or    ondansetron (Zofran ODT) dispertab 4 mg  4 mg    [Held by provider] amLODIPine (Norvasc) tablet 5 mg  5 mg    apixaban (Eliquis) tablet 5 mg  5 mg    [Held by provider] atenolol (Tenormin) tablet 50 mg  50 mg    dorzolamide-timolol (Cosopt) 2-0.5 % ophthalmic drops 1 Drop  1 Drop    simvastatin (Zocor) tablet 20 mg  20 mg    insulin lispro (HumaLOG,AdmeLOG) subcutaneous injection  1-6 Units    And    dextrose 50 % (D50W) injection 25 g  25 g    valsartan (Diovan) tablet 160 mg  160 mg    And    hydroCHLOROthiazide tablet 25 mg  25 mg       Allergies:  Allergies   Allergen Reactions    Codeine Nausea    Latex Rash          Nickel Rash               Physical Exam:  Vitals: /68   Pulse 83   Temp 36.1 °C (97 °F) (Temporal)   Resp 17   Ht 1.626 m (5' 4\")   Wt 66.3 kg (146 lb 2.6 oz)   SpO2 91%   Gen: NAD  Head: NC/AT  Eyes/ Nose/ Mouth: PERRLA, moist mucous membranes  Cardio: RRR, good distal perfusion, warm extremities  Pulm: normal respiratory effort, no cyanosis   Abd: Soft NTND, negative borborygmi   Ext: No peripheral edema. No calf tenderness. No clubbing.    Mental status:  A&Ox4 (person, place, date, situation) answers questions appropriately follows commands  Speech: fluent, no aphasia or dysarthria    Motor:      Upper Extremity  Myotome R L   Shoulder flexion C5 5 5   Elbow flexion C5 5 5   Wrist extension C6 5 5   Elbow extension C7 5 5   Finger flexion C8 5 5   Finger abduction T1 5 5     Lower Extremity Myotome R L   Hip flexion L2 5 4/5   Knee " extension L3 5 4/5   Ankle dorsiflexion L4 5 5   Toe extension L5 5 5   Ankle plantarflexion S1 5 5     Skin:  No documented skin lesions as of 3/31    Labs: Reviewed and significant for   Recent Labs     03/28/25  1731 03/29/25  0015 03/30/25  0117   RBC 4.69 4.24 4.73   HEMOGLOBIN 14.8 13.2 14.4   HEMATOCRIT 43.8 39.1 44.9   PLATELETCT 287 276 285     Recent Labs     03/29/25  0015 03/30/25  0117 03/31/25  0706   SODIUM 142 140 137   POTASSIUM 3.1* 4.5 4.2   CHLORIDE 99 101 96   CO2 31 28 28   GLUCOSE 148* 117* 133*   BUN 26* 29* 43*   CREATININE 0.65 0.80 1.69*   CALCIUM 8.9 8.9 8.5     Recent Results (from the past 24 hours)   POCT glucose device results    Collection Time: 03/30/25 11:31 AM   Result Value Ref Range    POC Glucose, Blood 207 (H) 65 - 99 mg/dL   POCT glucose device results    Collection Time: 03/30/25  5:01 PM   Result Value Ref Range    POC Glucose, Blood 160 (H) 65 - 99 mg/dL   Basic Metabolic Panel    Collection Time: 03/31/25  7:06 AM   Result Value Ref Range    Sodium 137 135 - 145 mmol/L    Potassium 4.2 3.6 - 5.5 mmol/L    Chloride 96 96 - 112 mmol/L    Co2 28 20 - 33 mmol/L    Glucose 133 (H) 65 - 99 mg/dL    Bun 43 (H) 8 - 22 mg/dL    Creatinine 1.69 (H) 0.50 - 1.40 mg/dL    Calcium 8.5 8.5 - 10.5 mg/dL    Anion Gap 13.0 7.0 - 16.0   MAGNESIUM    Collection Time: 03/31/25  7:06 AM   Result Value Ref Range    Magnesium 2.3 1.5 - 2.5 mg/dL   ESTIMATED GFR    Collection Time: 03/31/25  7:06 AM   Result Value Ref Range    GFR (CKD-EPI) 28 (A) >60 mL/min/1.73 m 2         ASSESSMENT:  Patient is a 89 y.o. female admitted with lower extremity edema, negative for DVT, undergoing diuresis with delicate fluid balance now requiring IV fluids    Rehabilitation: Impaired ADLs and mobility  Barriers to transfer include: Insurance authorization, TCCs to verify disposition, medical clearance and bed availability. All cases are subject to administrative review.     [facility] Code / Diagnosis to Support: 0016 -  Debility (Non-Cardiac, Non-Pulmonary)    Disposition recommendations:  - Good candidate for IPR.  Patient has deficits with mobility and ADLs secondary to medical debility.  Patient has good discharge support from community resources, and a stable discharge environment which is her single-story home.   -IPR is an option for her if she chooses to come, at the moment she is wanting to go home  -Recommend walking patient around the unit to see what her level of endurance is  -PMR to follow in the periphery for rehab appropriateness, please reach out with questions or request for medical management    Medical Complexity:    Debility  -Secondary to acute heart failure with preserved ejection fraction  -Primary team diuresing and managing fluid balance  -Continue PT OT while in house  -Good potential candidate for IPR    Bilateral lower extremity edema  -Amlodipine held  -LLE ultrasound negative for DVT  -Forced diuresis with Lasix    Paroxysmal atrial fibrillation  -Continuing home atenolol and apixaban    Hypertension  -Continuing home valsartan, HCTZ, atenolol    TIAGO  -Creatinine 1.69, GFR 28  -Primary team managing  -1 L fluid bolus 3/31    Hypothyroidism  -Continuing home Synthroid 50 mcg    Hyperlipidemia  -Continuing home simvastatin 20 mg    DVT PPX: Eliquis 5 mg twice daily      Thank you for allowing us to participate in the care of this patient.     Total time: >80 minutes. This includes time spent reviewing patient's history, notes, labs, imaging, vitals, medications, examining patient, discussing care with patient and family including prognosis, risk reduction, benefits of treatment, and options for next stage of care, and communicating recommendations to primary team.     Johny Still, DO   Physical Medicine and Rehabilitation     Please note that this dictation was created using voice recognition software. I have made every reasonable attempt to correct obvious errors, but there may be errors of grammar  and possibly content that I did not discover before finalizing the note.

## 2025-03-31 NOTE — PROGRESS NOTES
"RN into room for chair alarm going off. Pt found walking in room. Alarm did not notify via voalte, so this RN had pt sit back in chair and then attempted to reconnect the bed alarm to troubleshoot the problem which caused the alarm to go off a few more times.     When this RN returned to the room to fix the chair alarm pt was again found walking in room. Pt stated \"I'm not going to sit here and let you spy on me, I'm going home\" and began to grab her belongings. Pt A&Ox4.This RN attempted to redirect pt and explain that she is not medically safe to go home at this time since PT is recommending a SNF and she would be leaving AMA. Pt stated \"I'm calling the police,\" and \"this is imprisonment.\" Charge RN and ARTEM Perdomo notified.    Charge RN bedside and able to deescalate pt. Talked about limiting the amount of people in and out of the room. Also discussed difference between strip alarm and frame alarm. Pt refusing both alarms at this time. Pt educated on fall safety and need to call RN prior to getting out of bed. Plan of care ongoing.   "

## 2025-03-31 NOTE — PROGRESS NOTES
Bedside report received from off going RN/tech: Perico assumed care of patient.     Fall Risk Score: HIGH RISK  Fall risk interventions in place: Place yellow fall risk ID band on patient, Provide patient/family education based on risk assessment, Educate patient/family to call staff for assistance when getting out of bed, Place fall precaution signage outside patient door, Utilize bed/chair fall alarm, and Bed alarm connected correctly  Bed type: Low air loss (Dell Score less than 17 interventions in place)  Patient on cardiac monitor: Yes  IVF/IV medications: Not Applicable   Oxygen: Room Air  Bedside sitter: Not Applicable   Isolation: Not applicable

## 2025-03-31 NOTE — PROGRESS NOTES
"Called to room by primary RN. Patient stating she wanted to leave AMA due to bed alarm being set off and staff responding. Per pt \"they were trying to ambush me!\". Explained bed alarm, fall precautions and expectation of staff responding to prevent falls. Patient oriented x4 able to answer all orientation questions. Patient agreeable to stay if bed alarm is turned off and only the primary RN enters room. Discussed utilizing the bed frame alarm, patient continues to refuse. Dicussed importance of calling for staff prior to any ambulation. Patient agreeable to call RN prior to ambulating.   "

## 2025-03-31 NOTE — PROGRESS NOTES
RN into room d/t pt being off tele box. Pt dressed and stating she is going home again. Charge RN and NOC DANE Peña notified. DANE Peña at bedside and per PA, pt oriented and able to leave. Plan of care discussed with pt again and she is agreeable to stay at this time.

## 2025-03-31 NOTE — PROGRESS NOTES
Bedside report received from off going RN/tech: Hannah, assumed care of patient.     Fall Risk Score: HIGH RISK  Fall risk interventions in place: Place yellow fall risk ID band on patient, Provide patient/family education based on risk assessment, Educate patient/family to call staff for assistance when getting out of bed, Place fall precaution signage outside patient door, Place patient in room close to nursing station, and Utilize bed/chair fall alarm  Bed type: Regular (Dell Score less than 17 interventions in place)  Patient on cardiac monitor: Yes  IVF/IV medications: Not Applicable   Oxygen: Room Air  Bedside sitter: Not Applicable   Isolation: Not applicable

## 2025-03-31 NOTE — DISCHARGE PLANNING
[facility] Transitional Care Coordination     Referral from: Dr. Sewell  Insurance Provider on Facesheet: [facility]  Potential Rehab Diagnosis: Cardiac     Chart review indicates patient may have on going medical management and may have therapy needs to possibly meet inpatient rehab facility criteria with the goal of returning to community.     D/C support: daughter     Physiatry consultation forwarded per protocol.   TCC will follow      Thank you for the referral.

## 2025-03-31 NOTE — DISCHARGE PLANNING
Case Management Discharge Planning    Admission Date: 3/28/2025  GMLOS: 3.9  ALOS: 3    6-Clicks ADL Score: 17  6-Clicks Mobility Score: 11  PT and/or OT Eval ordered: Yes  Post-acute Referrals Ordered: No  Post-acute Choice Obtained: No  Has referral(s) been sent to post-acute provider:  No    Anticipated Discharge Dispo: Discharge Disposition: Discharged to home/self care (01)  Discharge Address: [address] [place] [address] [address] [place] [address]    DME Needed: No    Action(s) Taken: DC Assessment Complete (See below)    RNCM met with patient at bedside to complete assessment and discuss discharge planning. Pt is alert and oriented X4, and able to verify demographics.     Patient reports she lives alone on the ground floor of her 2-story condo. She uses a cane and is independent with ADLs/IADLs.     Patient reports she has the support of her neighbor and daughter who lives in [place].     The patient's PCP is Dr. Kevin Bryson; preferred pharmacy is [facility] [address]. Insurance coverage via Medicare and [employer]. Pt has income from [employer] and pension.     Patient denies smoking, alcohol and recreational drug use. No MH concerns reported.     RNCM discussed discharge planning. Patient declined SNF placement and home health services. Pt would like to go home; reports she will use a cab or have her friends provide transport home.      Escalations Completed: None    Medically Clear: Yes    Next Steps: F/U with medical team regarding D/C needs/ barriers.     Barriers to Discharge: None    Is the patient up for discharge tomorrow: No       Care Transition Team Assessment    Information Source  Orientation Level: Oriented X4  Information Given By: Patient  Informant's Name: Camelia Frederick  Who is responsible for making decisions for patient? : Patient    Readmission Evaluation  Is this a readmission?: No    Elopement Risk  Legal Hold: No  Ambulatory or Self Mobile in Wheelchair: Yes  Disoriented: No  Psychiatric Symptoms: None  History of Wandering:  No  Elopement this Admit: No  Vocalizing Wanting to Leave: No  Displays Behaviors, Body Language Wanting to Leave: No-Not at Risk for Elopement  Elopement Risk: Not at Risk for Elopement    Interdisciplinary Discharge Planning  Lives with - Patient's Self Care Capacity: Alone and Able to Care For Self  Patient or legal guardian wants to designate a caregiver: No  Support Systems: Friends / Neighbors  Housing / Facility: [address] [facility] [facility]  Prior Services: Home-Independent    Discharge Preparedness  What is your plan after discharge?: Home with help  What are your discharge supports?: Child, Other (comment) (Neighbor)  Prior Functional Level: Ambulatory, Uses Cane, Independent with Activities of Daily Living, Independent with Medication Management    Functional Assesment  Prior Functional Level: Ambulatory, Uses Cane, Independent with Activities of Daily Living, Independent with Medication Management    Finances  Financial Barriers to Discharge: No  Prescription Coverage: Yes    Vision / Hearing Impairment  Vision Impairment : Yes  Right Eye Vision: Impaired, Wears Glasses  Left Eye Vision: Blind  Hearing Impairment : Yes  Hearing Impairment: Both Ears, Hearing Device(s) Available  Does Pt Need Special Equipment for the Hearing Impaired?: No    Advance Directive  Advance Directive?: None    Domestic Abuse  Have you ever been the victim of abuse or violence?: No  Possible Abuse/Neglect Reported to:: Not Applicable    Psychological Assessment  History of Substance Abuse: None  History of Psychiatric Problems: No    Discharge Risks or Barriers  Discharge risks or barriers?: Lives alone, no community support  Patient risk factors: Lack of outside supports, Complex medical needs    Anticipated Discharge Information  Discharge Disposition: Discharged to home/self care (01)  Discharge Address: [address] [place] [address] [address] [place] [id number]

## 2025-03-31 NOTE — DISCHARGE PLANNING
Case Management Discharge Planning    Admission Date: 3/28/2025  GMLOS: 3.9  ALOS: 3    6-Clicks ADL Score: 17  6-Clicks Mobility Score: 11  PT and/or OT Eval ordered: Yes  Post-acute Referrals Ordered: Yes  Post-acute Choice Obtained: No  Has referral(s) been sent to post-acute provider:  Yes      Anticipated Discharge Dispo: Discharge Disposition: Discharged to home/self care (01) [facility] # [id number]     DME Needed: No    Action(s) Taken: Patient discussed in IDT rounds; per MD pt is refusing SNF and home health services. Pt would like to discharge home.    Escalations Completed: None    Medically Clear: Yes    Next Steps: F/U with medical team regarding D/C needs/ barriers.     Barriers to Discharge: Transportation    Is the patient up for discharge tomorrow: No

## 2025-03-31 NOTE — CARE PLAN
The patient is Stable - Low risk of patient condition declining or worsening    Shift Goals  Clinical Goals: Fluids replacement, recheck cr  Patient Goals: go home  Family Goals: syd    Progress made toward(s) clinical / shift goals:    Problem: Pain - Standard  Goal: Alleviation of pain or a reduction in pain to the patient’s comfort goal  Outcome: Progressing     Problem: Care Map:  Day 3 Optimal Outcome for the Heart Failure Patient  Goal: Day 3:  Optimal Care of the heart failure patient  Outcome: Met  Note: Educated patient on booklet. Discussed meds with patient and importance of taking them. I/O's documented. Discussed diet with patient.        Patient is not progressing towards the following goals:

## 2025-04-01 ENCOUNTER — HOSPITAL ENCOUNTER (OUTPATIENT)
Dept: LAB | Facility: MEDICAL CENTER | Age: OVER 89
End: 2025-04-01
Attending: STUDENT IN AN ORGANIZED HEALTH CARE EDUCATION/TRAINING PROGRAM
Payer: MEDICARE

## 2025-04-01 DIAGNOSIS — I27.20 PULMONARY HYPERTENSION (HCC): ICD-10-CM

## 2025-04-01 LAB
ALBUMIN SERPL BCP-MCNC: 3.9 G/DL (ref 3.2–4.9)
ALBUMIN/GLOB SERPL: 1.1 G/DL
ALP SERPL-CCNC: 59 U/L (ref 30–99)
ALT SERPL-CCNC: 14 U/L (ref 2–50)
ANION GAP SERPL CALC-SCNC: 14 MMOL/L (ref 7–16)
AST SERPL-CCNC: 27 U/L (ref 12–45)
BASOPHILS # BLD AUTO: 0.8 % (ref 0–1.8)
BASOPHILS # BLD: 0.07 K/UL (ref 0–0.12)
BILIRUB SERPL-MCNC: 0.4 MG/DL (ref 0.1–1.5)
BUN SERPL-MCNC: 48 MG/DL (ref 8–22)
CALCIUM ALBUM COR SERPL-MCNC: 8.8 MG/DL (ref 8.5–10.5)
CALCIUM SERPL-MCNC: 8.7 MG/DL (ref 8.5–10.5)
CHLORIDE SERPL-SCNC: 100 MMOL/L (ref 96–112)
CO2 SERPL-SCNC: 25 MMOL/L (ref 20–33)
CREAT SERPL-MCNC: 0.88 MG/DL (ref 0.5–1.4)
EOSINOPHIL # BLD AUTO: 0.05 K/UL (ref 0–0.51)
EOSINOPHIL NFR BLD: 0.6 % (ref 0–6.9)
ERYTHROCYTE [DISTWIDTH] IN BLOOD BY AUTOMATED COUNT: 45.4 FL (ref 35.9–50)
GFR SERPLBLD CREATININE-BSD FMLA CKD-EPI: 61 ML/MIN/1.73 M 2
GLOBULIN SER CALC-MCNC: 3.6 G/DL (ref 1.9–3.5)
GLUCOSE SERPL-MCNC: 149 MG/DL (ref 65–99)
HCT VFR BLD AUTO: 42.7 % (ref 37–47)
HGB BLD-MCNC: 13.8 G/DL (ref 12–16)
IMM GRANULOCYTES # BLD AUTO: 0.02 K/UL (ref 0–0.11)
IMM GRANULOCYTES NFR BLD AUTO: 0.2 % (ref 0–0.9)
LYMPHOCYTES # BLD AUTO: 3.43 K/UL (ref 1–4.8)
LYMPHOCYTES NFR BLD: 40.9 % (ref 22–41)
MAGNESIUM SERPL-MCNC: 2.2 MG/DL (ref 1.5–2.5)
MCH RBC QN AUTO: 30.5 PG (ref 27–33)
MCHC RBC AUTO-ENTMCNC: 32.3 G/DL (ref 32.2–35.5)
MCV RBC AUTO: 94.5 FL (ref 81.4–97.8)
MONOCYTES # BLD AUTO: 0.74 K/UL (ref 0–0.85)
MONOCYTES NFR BLD AUTO: 8.8 % (ref 0–13.4)
NEUTROPHILS # BLD AUTO: 4.08 K/UL (ref 1.82–7.42)
NEUTROPHILS NFR BLD: 48.7 % (ref 44–72)
NRBC # BLD AUTO: 0 K/UL
NRBC BLD-RTO: 0 /100 WBC (ref 0–0.2)
PLATELET # BLD AUTO: 307 K/UL (ref 164–446)
PMV BLD AUTO: 11 FL (ref 9–12.9)
POTASSIUM SERPL-SCNC: 4 MMOL/L (ref 3.6–5.5)
PROT SERPL-MCNC: 7.5 G/DL (ref 6–8.2)
RBC # BLD AUTO: 4.52 M/UL (ref 4.2–5.4)
SODIUM SERPL-SCNC: 139 MMOL/L (ref 135–145)
WBC # BLD AUTO: 8.4 K/UL (ref 4.8–10.8)

## 2025-04-01 PROCEDURE — 36415 COLL VENOUS BLD VENIPUNCTURE: CPT | Mod: GA

## 2025-04-01 PROCEDURE — 83735 ASSAY OF MAGNESIUM: CPT | Mod: GA

## 2025-04-01 PROCEDURE — 85025 COMPLETE CBC W/AUTO DIFF WBC: CPT | Mod: GA

## 2025-04-01 PROCEDURE — 80053 COMPREHEN METABOLIC PANEL: CPT

## 2025-04-01 NOTE — PROGRESS NOTES
Camelia Frederick has been provided discharge instructions, to include follow up care, home medications, and activity/diet reviewed. Copy of discharge instructions in patient chart, signed and reviewed. Patient verbalizes the understanding of the discharge instructions.  Bilateral PIVs removed, tip intact, pressure dressing applied. Arm band removed. Patient did not have home meds during admit.  Questions and concerns addressed prior to leaving the discharge lounge. Transported via car, accompanied by friend. Patient discharge to home.

## 2025-04-07 ENCOUNTER — OFFICE VISIT (OUTPATIENT)
Dept: MEDICAL GROUP | Facility: CLINIC | Age: OVER 89
End: 2025-04-07
Payer: MEDICARE

## 2025-04-07 VITALS
WEIGHT: 159 LBS | SYSTOLIC BLOOD PRESSURE: 181 MMHG | TEMPERATURE: 97.7 F | HEART RATE: 100 BPM | DIASTOLIC BLOOD PRESSURE: 93 MMHG | OXYGEN SATURATION: 91 % | BODY MASS INDEX: 27.14 KG/M2 | HEIGHT: 64 IN

## 2025-04-07 DIAGNOSIS — I50.9 CONGESTIVE HEART FAILURE, UNSPECIFIED HF CHRONICITY, UNSPECIFIED HEART FAILURE TYPE (HCC): ICD-10-CM

## 2025-04-07 DIAGNOSIS — Z09 ENCOUNTER FOR EXAMINATION FOLLOWING TREATMENT AT HOSPITAL: ICD-10-CM

## 2025-04-07 DIAGNOSIS — R54 AGE-RELATED PHYSICAL DEBILITY: ICD-10-CM

## 2025-04-07 PROCEDURE — 3080F DIAST BP >= 90 MM HG: CPT | Performed by: FAMILY MEDICINE

## 2025-04-07 PROCEDURE — 3077F SYST BP >= 140 MM HG: CPT | Performed by: FAMILY MEDICINE

## 2025-04-07 PROCEDURE — 99214 OFFICE O/P EST MOD 30 MIN: CPT | Performed by: FAMILY MEDICINE

## 2025-04-07 ASSESSMENT — FIBROSIS 4 INDEX: FIB4 SCORE: 2.16

## 2025-04-07 NOTE — ASSESSMENT & PLAN NOTE
I have ordered home health to assist with vitals, BP weight and med management.   Discuss begun on various options including assisted living all the way to full nursing home care

## 2025-04-07 NOTE — PROGRESS NOTES
Subjective:     CC: Hospital f/u, CHF pEF, debility    HPI:   Camelia presents today to discuss the following issues       Problem   Age-Related Physical Debility    Ana is concerned that she is no longer strong enough to live at home alone. Currently she is at a good friends house. She will be contacting family members to look at options. She is not driving     Encounter for Examination Following Treatment At Hospital    Ana was sent to the ER to evaluate LLE edema and was admitted for CHF. Discharge dx per below:    Principal Problem (Resolved):    Acute heart failure with preserved ejection fraction (HFpEF, >= 50%) (HCC) (POA: Yes)  Active Problems:    Bilateral lower extremity edema (POA: Yes)    Acquired hypothyroidism (POA: Yes)      Overview: Feels fine on generic levothyroxine. Last TSH normal.     Type 2 diabetes mellitus without complication (HCC) (POA: Yes)      Overview: Due for A1C but not full labs. Has enough meds. Had a retinal exam       recently.              Current Outpatient Medications Ordered in Epic   Medication Sig Dispense Refill    levothyroxine (SYNTHROID) 50 MCG Tab Take 1 Tablet by mouth every morning on an empty stomach. 30 Tablet 2    valsartan (DIOVAN) 160 MG Tab Take 1 Tablet by mouth every day. 30 Tablet 2    furosemide (LASIX) 20 MG Tab Take 1-2 Tablets by mouth 1 time a day as needed (leg swelling). 60 Tablet 2    dorzolamide-timolol (COSOPT) 2-0.5 % Solution Administer 1 Drop into the left eye 2 times a day.      Ascorbic Acid (VITAMIN C PO) Take 1 Tablet by mouth 4 times a day.      Calcium Citrate (CITRACAL PO) Take 1 Tablet by mouth every morning.      multivitamin Tab Take 1 Tablet by mouth every morning.      simvastatin (ZOCOR) 20 MG Tab Take 1 Tablet by mouth every evening. (Patient taking differently: Take 20 mg by mouth every morning.) 90 Tablet 3    metFORMIN (GLUCOPHAGE) 500 MG Tab Take 1 Tablet by mouth every day. 90 Tablet 3    apixaban (ELIQUIS) 5mg Tab  "Take 1 Tablet by mouth 2 times a day. 180 Tablet 2    B Complex-C (SUPER B COMPLEX PO) Take 1 Tablet by mouth every morning.       No current [facility]-ordered facility-administered medications on file.       Health Maintenance:     ROS:  Gen: no fevers/chills, no changes in weight  Eyes: no changes in vision  ENT: no sore throat, no hearing loss, no bloody nose  Pulm: no sob, no cough  CV: no chest pain, no palpitations  GI: no nausea/vomiting, no diarrhea  : no dysuria  MSk: no myalgias  Skin: no rash  Neuro: no headaches, no numbness/tingling  Heme/Lymph: no easy bruising      Objective:     Exam:  BP (!) 181/93 (BP Location: Left arm, Patient Position: Sitting, BP Cuff Size: Adult)   Pulse 100   Temp 36.5 °C (97.7 °F) (Temporal)   Ht 1.626 m (5' 4\")   Wt 72.1 kg (159 lb)   SpO2 91%   BMI 27.29 kg/m²  Body mass index is 27.29 kg/m².    Gen: Alert and oriented, No apparent distress.  Neck: Neck is supple without lymphadenopathy.  Lungs: Normal effort, CTA bilaterally, no wheezes, rhonchi, or rales  CV: Regular rate and rhythm. No murmurs, rubs, or gallops.  Ext: No clubbing, cyanosis, edema.          Assessment & Plan:     89 y.o. female with the following -     Problem List Items Addressed This Visit       Encounter for examination following treatment at hospital    Pt has cardiology f/u scheduled tomorrow.  She has enough of her current medications. I see no indication for immediate repeat of labs, but will recheck TSH in 6 weeks.She is to f/u w me in 4 weeks so we keep track of t his.  They are to make sure that the new dose of synthroid (0.5 mcg/d) is available at the pharmacy.         Age-related physical debility    I have ordered home health to assist with vitals, BP weight and med management.   Discuss begun on various options including assisted living all the way to full nursing home care           Other Visit Diagnoses         Congestive heart failure, unspecified HF chronicity, unspecified heart " failure type (HCC)        Relevant Orders    Referral to Home Health            I spent a total of 33 minutes with record review, exam, communication with the patient, communication with other providers, and documentation of this encounter.      No follow-ups on file.

## 2025-04-07 NOTE — ASSESSMENT & PLAN NOTE
Pt has cardiology f/u scheduled tomorrow.  She has enough of her current medications. I see no indication for immediate repeat of labs, but will recheck TSH in 6 weeks.She is to f/u w me in 4 weeks so we keep track of t his.  They are to make sure that the new dose of synthroid (0.5 mcg/d) is available at the pharmacy.

## 2025-04-08 ENCOUNTER — OFFICE VISIT (OUTPATIENT)
Dept: CARDIOLOGY | Facility: MEDICAL CENTER | Age: OVER 89
End: 2025-04-08
Attending: PHYSICIAN ASSISTANT
Payer: MEDICARE

## 2025-04-08 VITALS
OXYGEN SATURATION: 93 % | HEART RATE: 104 BPM | DIASTOLIC BLOOD PRESSURE: 72 MMHG | WEIGHT: 158 LBS | RESPIRATION RATE: 18 BRPM | SYSTOLIC BLOOD PRESSURE: 128 MMHG | HEIGHT: 64 IN | BODY MASS INDEX: 26.98 KG/M2

## 2025-04-08 DIAGNOSIS — I65.23 BILATERAL CAROTID ARTERY STENOSIS: ICD-10-CM

## 2025-04-08 DIAGNOSIS — I47.10 SVT (SUPRAVENTRICULAR TACHYCARDIA) (HCC): ICD-10-CM

## 2025-04-08 DIAGNOSIS — E11.9 TYPE 2 DIABETES MELLITUS WITHOUT COMPLICATION, WITHOUT LONG-TERM CURRENT USE OF INSULIN (HCC): ICD-10-CM

## 2025-04-08 DIAGNOSIS — I48.0 PAROXYSMAL ATRIAL FIBRILLATION (HCC): ICD-10-CM

## 2025-04-08 DIAGNOSIS — I50.32 CHRONIC HEART FAILURE WITH PRESERVED EJECTION FRACTION (HCC): ICD-10-CM

## 2025-04-08 DIAGNOSIS — E78.00 PURE HYPERCHOLESTEROLEMIA: ICD-10-CM

## 2025-04-08 DIAGNOSIS — G45.9 TIA (TRANSIENT ISCHEMIC ATTACK): ICD-10-CM

## 2025-04-08 DIAGNOSIS — Z79.01 CHRONIC ANTICOAGULATION: ICD-10-CM

## 2025-04-08 DIAGNOSIS — I10 PRIMARY HYPERTENSION: Primary | ICD-10-CM

## 2025-04-08 PROCEDURE — 99213 OFFICE O/P EST LOW 20 MIN: CPT | Performed by: PHYSICIAN ASSISTANT

## 2025-04-08 RX ORDER — SPIRONOLACTONE 25 MG/1
12.5 TABLET ORAL DAILY
Qty: 30 TABLET | Refills: 3 | Status: SHIPPED | OUTPATIENT
Start: 2025-04-08

## 2025-04-08 RX ORDER — ATENOLOL 25 MG/1
25 TABLET ORAL DAILY
Qty: 90 TABLET | Refills: 3 | Status: SHIPPED | OUTPATIENT
Start: 2025-04-08

## 2025-04-08 ASSESSMENT — ENCOUNTER SYMPTOMS
NAUSEA: 0
WEAKNESS: 0
ORTHOPNEA: 0
PND: 0
COUGH: 0
BLURRED VISION: 0
EYES NEGATIVE: 1
CONSTITUTIONAL NEGATIVE: 1
BRUISES/BLEEDS EASILY: 0
ABDOMINAL PAIN: 0
MYALGIAS: 0
CHILLS: 0
SHORTNESS OF BREATH: 0
NEUROLOGICAL NEGATIVE: 1
VOMITING: 0
FEVER: 0
GASTROINTESTINAL NEGATIVE: 1
LOSS OF CONSCIOUSNESS: 0
PALPITATIONS: 0
PSYCHIATRIC NEGATIVE: 1
CLAUDICATION: 0
HEADACHES: 0
MUSCULOSKELETAL NEGATIVE: 1
DIZZINESS: 0
DOUBLE VISION: 0

## 2025-04-08 ASSESSMENT — MINNESOTA LIVING WITH HEART FAILURE QUESTIONNAIRE (MLHF)
DIFFICULTY WORKING TO EARN A LIVING: 0
DIFFICULTY SOCIALIZING WITH FAMILY OR FRIENDS: 0
FEELING LIKE A BURDEN TO FAMILY AND FRIENDS: 0
TOTAL_SCORE: 4
WORKING AROUND THE HOUSE OR YARD DIFFICULT: 0
MAKING YOU SHORT OF BREATH: 1
HAVING TO SIT OR LIE DOWN DURING THE DAY: 0
MAKING YOU STAY IN A HOSPITAL: 0
COSTING YOU MONEY FOR MEDICAL CARE: 0
DIFFICULTY TO CONCENTRATE OR REMEMBERING THINGS: 0
DIFFICULTY SLEEPING WELL AT NIGHT: 0
DIFFICULTY GOING AWAY FROM HOME: 0
WALKING ABOUT OR CLIMBING STAIRS DIFFICULT: 1
EATING LESS FOODS YOU LIKE: 0
MAKING YOU FEEL DEPRESSED: 0
DIFFICULTY WITH SEXUAL ACTIVITIES: 0
MAKING YOU WORRY: 0
SWELLING IN ANKLES OR LEGS: 1
TIRED, FATIGUED OR LOW ON ENERGY: 1
GIVING YOU SIDE EFFECTS FROM TREATMENTS: 0
LOSS OF SELF CONTROL IN YOUR LIFE: 0
DIFFICULTY WITH RECREATIONAL PASTIMES, SPORTS, HOBBIES: 0

## 2025-04-08 ASSESSMENT — 6 MINUTE WALK TEST (6MWT): TOTAL DISTANCE WALKED (METERS): 196

## 2025-04-08 ASSESSMENT — FIBROSIS 4 INDEX: FIB4 SCORE: 2.16

## 2025-04-08 NOTE — PROGRESS NOTES
"Chief Complaint   Patient presents with    New Patient     DX: Paroxysmal atrial fibrillation (HCC)       Subjective     Camelia Frederick is a 89 y.o. female who presents today     Past Medical History:   Diagnosis Date    Allergy     Anemia     \"In the past\"    Blood clotting disorder (HCC) 03/09/2018    Left eye 2 years ago.    Breath shortness 03/09/2018    Occasional with exertion    Cataract     bilateral IOL    Cold 03/09/2018    Pt states feels like there may be phlem building up in her throat, feels like \"I might be coming down with something\".  Pt instructed to call Dr. Rasmussen's office if she develops a sore throat, cold and/or fever.  Pt verbalized an understanding.    Dental disorder     lower partial denture    Diabetes     oral medication    High cholesterol     Hypercholesteremia     Hypertension     Hypothyroid     thyroidectomy    Renal disorder 1990's    stones     Past Surgical History:   Procedure Laterality Date    PENETRATING KERATOPLASTY Left 03/13/2018    Procedure: DESCEMETS STRIPPING ENDOTHELIAL KERATOPLASTY;  Surgeon: Guille Rasmussen M.D.;  Location: SURGERY SAME DAY [facility] ORS;  Service: Ophthalmology    PTOSIS REPAIR Bilateral 10/05/2016    Procedure: PTOSIS REPAIR upper lid (levator advanced set up);  Surgeon: Silvino Seay M.D.;  Location: SURGERY SURGICAL [facility] ORS;  Service:     EYE SURGERY Left 01/01/2015    Retinal repair \"blood clot removed\"    THYROIDECTOMY  09/01/1980    LITHOTRIPSY  09/01/1980    kidney stone    TONSILLECTOMY  01/01/1944    APPENDECTOMY      CATARACT EXTRACTION WITH IOL Bilateral 2015/2016    OPEN REDUCTION       Family History   Problem Relation Age of Onset    Diabetes Sister     Heart Attack Neg Hx     Heart Disease Neg Hx      Social History     Socioeconomic History    Marital status: Single     Spouse name: Not on file    Number of children: Not on file    Years of education: Not on file    Highest education level: Bachelor's degree (e.g., BA, AB, " BS)   Occupational History    Not on file   Tobacco Use    Smoking status: Never    Smokeless tobacco: Never   Vaping Use    Vaping status: Never Used   Substance and Sexual Activity    Alcohol use: Yes     Comment: Occ    Drug use: No    Sexual activity: Not on file   Other Topics Concern    Not on file   Social History Narrative    Not on file     Social Drivers of Health     Financial Resource Strain: Low Risk  (2/21/2023)    Overall Financial Resource Strain (CARDIA)     Difficulty of Paying Living Expenses: Not hard at all   Food Insecurity: No Food Insecurity (3/28/2025)    Hunger Vital Sign     Worried About Running Out of Food in the Last Year: Never true     Ran Out of Food in the Last Year: Never true   Transportation Needs: No Transportation Needs (3/28/2025)    PRAPARE - Transportation     Lack of Transportation (Medical): No     Lack of Transportation (Non-Medical): No   Physical Activity: Inactive (2/21/2023)    Exercise Vital Sign     Days of Exercise per Week: 0 days     Minutes of Exercise per Session: 0 min   Stress: No Stress Concern Present (2/21/2023)    [place] [employer] - Occupational Stress Questionnaire     Feeling of Stress : Not at all   Social Connections: Moderately Isolated (2/21/2023)    Social Connection and Isolation Panel [NHANES]     Frequency of Communication with Friends and Family: More than three times a week     Frequency of Social Gatherings with Friends and Family: Twice a week     Attends Congregation Services: Never     Active Member of Clubs or Organizations: Yes     Attends Club or Organization Meetings: More than 4 times per year     Marital Status:    Intimate Partner Violence: Not At Risk (3/28/2025)    Humiliation, Afraid, Rape, and Kick questionnaire     Fear of Current or Ex-Partner: No     Emotionally Abused: No     Physically Abused: No     Sexually Abused: No   Housing Stability: Low Risk  (3/28/2025)    Housing Stability Vital Sign      Unable to Pay for Housing in the Last Year: No     Number of Times Moved in the Last Year: 0     Homeless in the Last Year: No     Allergies   Allergen Reactions    Codeine Nausea    Latex Rash          Nickel Rash           Outpatient Encounter Medications as of 4/8/2025   Medication Sig Dispense Refill    levothyroxine (SYNTHROID) 50 MCG Tab Take 1 Tablet by mouth every morning on an empty stomach. 30 Tablet 2    valsartan (DIOVAN) 160 MG Tab Take 1 Tablet by mouth every day. 30 Tablet 2    furosemide (LASIX) 20 MG Tab Take 1-2 Tablets by mouth 1 time a day as needed (leg swelling). 60 Tablet 2    dorzolamide-timolol (COSOPT) 2-0.5 % Solution Administer 1 Drop into the left eye 2 times a day.      Ascorbic Acid (VITAMIN C PO) Take 1 Tablet by mouth 4 times a day.      Calcium Citrate (CITRACAL PO) Take 1 Tablet by mouth every morning.      multivitamin Tab Take 1 Tablet by mouth every morning.      simvastatin (ZOCOR) 20 MG Tab Take 1 Tablet by mouth every evening. (Patient taking differently: Take 20 mg by mouth every morning.) 90 Tablet 3    metFORMIN (GLUCOPHAGE) 500 MG Tab Take 1 Tablet by mouth every day. 90 Tablet 3    apixaban (ELIQUIS) 5mg Tab Take 1 Tablet by mouth 2 times a day. 180 Tablet 2    B Complex-C (SUPER B COMPLEX PO) Take 1 Tablet by mouth every morning.       No facility-administered encounter medications on file as of 4/8/2025.     Review of Systems   Constitutional: Negative.  Negative for chills, fever and malaise/fatigue.   HENT: Negative.     Eyes: Negative.  Negative for blurred vision and double vision.   Respiratory:  Negative for cough and shortness of breath.    Cardiovascular:  Positive for chest pain. Negative for palpitations, orthopnea, claudication, leg swelling and PND.   Gastrointestinal: Negative.  Negative for abdominal pain, nausea and vomiting.   Genitourinary: Negative.    Musculoskeletal: Negative.  Negative for myalgias.   Skin: Negative.  Negative for rash.  "  Neurological: Negative.  Negative for dizziness, loss of consciousness, weakness and headaches.   Endo/Heme/Allergies: Negative.  Does not bruise/bleed easily.   Psychiatric/Behavioral: Negative.                Objective     /72 (BP Location: Left arm, Patient Position: Sitting, BP Cuff Size: Adult)   Pulse (!) 104   Resp 18   Ht 1.626 m (5' 4\")   Wt 71.7 kg (158 lb)   SpO2 93%   BMI 27.12 kg/m²     Physical Exam                  Assessment & Plan     No diagnosis found.    Medical Decision Making: Today's Assessment/Status/Plan:                        " "  Psychiatric/Behavioral: Negative.                Objective     /72 (BP Location: Left arm, Patient Position: Sitting, BP Cuff Size: Adult)   Pulse (!) 104   Resp 18   Ht 1.626 m (5' 4\")   Wt 71.7 kg (158 lb)   SpO2 93%   BMI 27.12 kg/m²     Physical Exam  Vitals reviewed.   Constitutional:       General: She is not in acute distress.     Appearance: Normal appearance.   HENT:      Head: Normocephalic and atraumatic.      Right Ear: External ear normal.      Left Ear: External ear normal.   Eyes:      General: No scleral icterus.     Extraocular Movements: Extraocular movements intact.      Conjunctiva/sclera: Conjunctivae normal.      Pupils: Pupils are equal, round, and reactive to light.   Cardiovascular:      Rate and Rhythm: Normal rate. Rhythm irregular.      Pulses: Normal pulses.      Heart sounds: Normal heart sounds. No murmur heard.     No friction rub. No gallop.   Pulmonary:      Effort: Pulmonary effort is normal.      Breath sounds: Normal breath sounds.   Abdominal:      General: Bowel sounds are normal.      Palpations: Abdomen is soft.      Tenderness: There is no abdominal tenderness.   Musculoskeletal:         General: Normal range of motion.      Cervical back: Normal range of motion and neck supple.      Right lower leg: No edema.      Left lower leg: No edema.   Skin:     General: Skin is warm and dry.      Capillary Refill: Capillary refill takes less than 2 seconds.   Neurological:      General: No focal deficit present.      Mental Status: She is alert and oriented to person, place, and time.   Psychiatric:         Mood and Affect: Mood normal.         Behavior: Behavior normal.         Judgment: Judgment normal.               Latest Reference Range & Units 04/01/25 15:17   Sodium 135 - 145 mmol/L 139   Potassium 3.6 - 5.5 mmol/L 4.0   Chloride 96 - 112 mmol/L 100   Co2 20 - 33 mmol/L 25   Anion Gap 7.0 - 16.0  14.0   Glucose 65 - 99 mg/dL 149 (H)   Bun 8 - 22 mg/dL 48 (H) "   Creatinine 0.50 - 1.40 mg/dL 0.88   GFR (CKD-EPI) >60 mL/min/1.73 m 2 61   Calcium 8.5 - 10.5 mg/dL 8.7   Correct Calcium 8.5 - 10.5 mg/dL 8.8   AST(SGOT) 12 - 45 U/L 27   ALT(SGPT) 2 - 50 U/L 14   Alkaline Phosphatase 30 - 99 U/L 59   Total Bilirubin 0.1 - 1.5 mg/dL 0.4   Albumin 3.2 - 4.9 g/dL 3.9   Total Protein 6.0 - 8.2 g/dL 7.5   Globulin 1.9 - 3.5 g/dL 3.6 (H)   A-G Ratio g/dL 1.1   Magnesium 1.5 - 2.5 mg/dL 2.2   (H): Data is abnormally high    Cardiovascular imaging and procedures:    Echocardiogram 3/29/2025  CONCLUSIONS  Normal chamber sizes. Normal LV and RV systolic function. LVEF 65%   visually. RVSP estimated at 35 mmHg. Mild MAC and MR, mild AI. No   significant valular disease. Compared to the prior study on 8/18/24 no   significant changes.    Echocardiogram 3/18/2024  CONCLUSIONS  No prior study is available for comparison.   Hyperdynamic left ventricular systolic function.  The left ventricular ejection fraction is visually estimated to be   greater than 75%.  Mild tricuspid regurgitation.  Estimated right ventricular systolic pressure is 55 mmHg.  No evidence of right to left shunt by agitated saline study.  A definite cardiac source for a systemic thromboembolic event is not   identified, failure to do so does not exclude its presence. If   clinically indicated, a transesophageal study would be useful.     Initial 6 minute walk test, patient was able to complete 196 m during the 6 minute walk test. Her O2 saturation at baseline was 93% and at the end of the test, the O2 saturation was 89%. She reported level 5 of dyspnea on Johan scale.    MLWHF score 4          Assessment & Plan     1. Primary hypertension        2. Paroxysmal atrial fibrillation (HCC)  atenolol (TENORMIN) 25 MG Tab      3. Chronic heart failure with preserved ejection fraction (HCC)  spironolactone (ALDACTONE) 25 MG Tab    Basic Metabolic Panel      4. Pure hypercholesterolemia        5. Bilateral carotid artery stenosis         6. SVT (supraventricular tachycardia) ([facility])        7. TIA (transient ischemic attack)        8. Chronic anticoagulation        9. Type 2 diabetes mellitus without complication, without long-term current use of insulin ([facility])            Medical Decision Making: Today's Assessment/Status/Plan:        HFpEF AHA C NYHA II  -No current shortness of breath, but she did have some significant shortness of breath on her 6-minute walk test today.  -She appears euvolemic on exam and her labs are suggestive that she is quite dry.  -Stop Lasix and only take 20 mg daily as needed.  -Start spironolactone 25 mg daily.  -Repeat BMP in 1 week.  -She is not a good candidate for SGLT2i given at increased risk for infection.  -She was provided on education about diastolic heart failure.  -Encouraged daily standing weights.  - Discussed signs/symptoms of heart failure exacerbation and when to reach out to the PUMP line.    Atrial fibrillation on chronic anticoagulation  - Asymptomatic.    - Rates are well-controlled.  - Resume atenolol 25 mg daily.  - Continue anticoagulation with Eliquis 5 mg twice daily.    Hypertension  - Blood pressure is not well-controlled at home.  - Continue valsartan 160 mg daily.  - Resume atenolol per above.     Hyperlipidemia  Carotid artery stenosis  History of TIA  - Continue eliquis and simvastatin    Diabetes mellitus type 2  - Continue metformin.  - Not a good candidate for SGLT2i    Follow up in 3 months or sooner with clinical changes.    Encouraged her to reach out via MyChart or telephone with any questions or concerns.    Thank you for allowing me to participate in the care of Camelia Frederick .    Viktoria Little PA-C, Cardiology  [facility], [facility]  [address] [address]  Phone: [phone]  Fax: [phone]    PLEASE NOTE: This note was created using voice recognition software. I have made  every reasonable attempt to correct obvious errors, but I expect that there are errors of grammar and possibly content that I did not discover before finalizing the note.     I personally spent a total of 36 minutes which includes face-to-face time and non-face-to-face time spent on preparing to see the patient, reviewing hospital notes and tests, obtaining history from the patient, performing a medically appropriate exam, counseling and educating the patient, ordering medications/tests/procedures/referrals as clinically indicated, and documenting information in the electronic medical record.

## 2025-04-09 ENCOUNTER — APPOINTMENT (OUTPATIENT)
Dept: RADIOLOGY | Facility: MEDICAL CENTER | Age: OVER 89
End: 2025-04-09
Attending: EMERGENCY MEDICINE
Payer: MEDICARE

## 2025-04-09 ENCOUNTER — HOSPITAL ENCOUNTER (EMERGENCY)
Facility: MEDICAL CENTER | Age: OVER 89
End: 2025-04-10
Attending: EMERGENCY MEDICINE
Payer: MEDICARE

## 2025-04-09 DIAGNOSIS — M25.562 LEFT KNEE PAIN, UNSPECIFIED CHRONICITY: ICD-10-CM

## 2025-04-09 LAB
ANION GAP SERPL CALC-SCNC: 11 MMOL/L (ref 7–16)
BASOPHILS # BLD AUTO: 1 % (ref 0–1.8)
BASOPHILS # BLD: 0.07 K/UL (ref 0–0.12)
BUN SERPL-MCNC: 16 MG/DL (ref 8–22)
CALCIUM SERPL-MCNC: 9 MG/DL (ref 8.5–10.5)
CHLORIDE SERPL-SCNC: 103 MMOL/L (ref 96–112)
CO2 SERPL-SCNC: 25 MMOL/L (ref 20–33)
CREAT SERPL-MCNC: 0.68 MG/DL (ref 0.5–1.4)
EOSINOPHIL # BLD AUTO: 0.05 K/UL (ref 0–0.51)
EOSINOPHIL NFR BLD: 0.7 % (ref 0–6.9)
ERYTHROCYTE [DISTWIDTH] IN BLOOD BY AUTOMATED COUNT: 46.5 FL (ref 35.9–50)
GFR SERPLBLD CREATININE-BSD FMLA CKD-EPI: 81 ML/MIN/1.73 M 2
GLUCOSE SERPL-MCNC: 122 MG/DL (ref 65–99)
HCT VFR BLD AUTO: 43.6 % (ref 37–47)
HGB BLD-MCNC: 14.2 G/DL (ref 12–16)
IMM GRANULOCYTES # BLD AUTO: 0.01 K/UL (ref 0–0.11)
IMM GRANULOCYTES NFR BLD AUTO: 0.1 % (ref 0–0.9)
LYMPHOCYTES # BLD AUTO: 2.35 K/UL (ref 1–4.8)
LYMPHOCYTES NFR BLD: 34.7 % (ref 22–41)
MCH RBC QN AUTO: 30.9 PG (ref 27–33)
MCHC RBC AUTO-ENTMCNC: 32.6 G/DL (ref 32.2–35.5)
MCV RBC AUTO: 95 FL (ref 81.4–97.8)
MONOCYTES # BLD AUTO: 0.56 K/UL (ref 0–0.85)
MONOCYTES NFR BLD AUTO: 8.3 % (ref 0–13.4)
NEUTROPHILS # BLD AUTO: 3.74 K/UL (ref 1.82–7.42)
NEUTROPHILS NFR BLD: 55.2 % (ref 44–72)
NRBC # BLD AUTO: 0 K/UL
NRBC BLD-RTO: 0 /100 WBC (ref 0–0.2)
PLATELET # BLD AUTO: 351 K/UL (ref 164–446)
PMV BLD AUTO: 11.1 FL (ref 9–12.9)
POTASSIUM SERPL-SCNC: 4.2 MMOL/L (ref 3.6–5.5)
RBC # BLD AUTO: 4.59 M/UL (ref 4.2–5.4)
SODIUM SERPL-SCNC: 139 MMOL/L (ref 135–145)
WBC # BLD AUTO: 6.8 K/UL (ref 4.8–10.8)

## 2025-04-09 PROCEDURE — A9270 NON-COVERED ITEM OR SERVICE: HCPCS | Performed by: EMERGENCY MEDICINE

## 2025-04-09 PROCEDURE — 73590 X-RAY EXAM OF LOWER LEG: CPT | Mod: LT

## 2025-04-09 PROCEDURE — 97535 SELF CARE MNGMENT TRAINING: CPT

## 2025-04-09 PROCEDURE — 36415 COLL VENOUS BLD VENIPUNCTURE: CPT

## 2025-04-09 PROCEDURE — 700102 HCHG RX REV CODE 250 W/ 637 OVERRIDE(OP): Performed by: EMERGENCY MEDICINE

## 2025-04-09 PROCEDURE — 73700 CT LOWER EXTREMITY W/O DYE: CPT | Mod: LT

## 2025-04-09 PROCEDURE — 99285 EMERGENCY DEPT VISIT HI MDM: CPT

## 2025-04-09 PROCEDURE — 85025 COMPLETE CBC W/AUTO DIFF WBC: CPT

## 2025-04-09 PROCEDURE — 97163 PT EVAL HIGH COMPLEX 45 MIN: CPT

## 2025-04-09 PROCEDURE — 73564 X-RAY EXAM KNEE 4 OR MORE: CPT | Mod: LT

## 2025-04-09 PROCEDURE — 302875 HCHG BANDAGE ACE 4 OR 6""

## 2025-04-09 PROCEDURE — 80048 BASIC METABOLIC PNL TOTAL CA: CPT

## 2025-04-09 RX ORDER — VALSARTAN 80 MG/1
160 TABLET ORAL DAILY
Status: DISCONTINUED | OUTPATIENT
Start: 2025-04-09 | End: 2025-04-10 | Stop reason: HOSPADM

## 2025-04-09 RX ORDER — DORZOLAMIDE HYDROCHLORIDE AND TIMOLOL MALEATE 20; 5 MG/ML; MG/ML
1 SOLUTION/ DROPS OPHTHALMIC 2 TIMES DAILY
Status: DISCONTINUED | OUTPATIENT
Start: 2025-04-09 | End: 2025-04-10 | Stop reason: HOSPADM

## 2025-04-09 RX ORDER — ACETAMINOPHEN 325 MG/1
650 TABLET ORAL ONCE
Status: COMPLETED | OUTPATIENT
Start: 2025-04-09 | End: 2025-04-09

## 2025-04-09 RX ORDER — IBUPROFEN 600 MG/1
600 TABLET, FILM COATED ORAL EVERY 6 HOURS PRN
Status: DISCONTINUED | OUTPATIENT
Start: 2025-04-09 | End: 2025-04-10 | Stop reason: HOSPADM

## 2025-04-09 RX ORDER — LEVOTHYROXINE SODIUM 50 UG/1
50 TABLET ORAL
Status: DISCONTINUED | OUTPATIENT
Start: 2025-04-10 | End: 2025-04-10 | Stop reason: HOSPADM

## 2025-04-09 RX ORDER — ACETAMINOPHEN 325 MG/1
650 TABLET ORAL EVERY 6 HOURS PRN
Status: COMPLETED | OUTPATIENT
Start: 2025-04-09 | End: 2025-04-10

## 2025-04-09 RX ORDER — SIMVASTATIN 40 MG
20 TABLET ORAL EVERY MORNING
Status: DISCONTINUED | OUTPATIENT
Start: 2025-04-10 | End: 2025-04-10 | Stop reason: HOSPADM

## 2025-04-09 RX ADMIN — VALSARTAN 160 MG: 80 TABLET, FILM COATED ORAL at 17:37

## 2025-04-09 RX ADMIN — DORZOLAMIDE HYDROCHLORIDE AND TIMOLOL MALEATE 1 DROP: 20; 5 SOLUTION/ DROPS OPHTHALMIC at 17:36

## 2025-04-09 RX ADMIN — ACETAMINOPHEN 650 MG: 325 TABLET ORAL at 17:36

## 2025-04-09 RX ADMIN — ACETAMINOPHEN 650 MG: 325 TABLET ORAL at 10:25

## 2025-04-09 RX ADMIN — APIXABAN 5 MG: 5 TABLET, FILM COATED ORAL at 17:36

## 2025-04-09 ASSESSMENT — COGNITIVE AND FUNCTIONAL STATUS - GENERAL
STANDING UP FROM CHAIR USING ARMS: A LITTLE
MOVING FROM LYING ON BACK TO SITTING ON SIDE OF FLAT BED: A LITTLE
TURNING FROM BACK TO SIDE WHILE IN FLAT BAD: A LITTLE
SUGGESTED CMS G CODE MODIFIER MOBILITY: CK
SUGGESTED CMS G CODE MODIFIER MOBILITY: CJ
MOBILITY SCORE: 22
MOVING TO AND FROM BED TO CHAIR: A LITTLE
MOBILITY SCORE: 16
WALKING IN HOSPITAL ROOM: A LITTLE
TURNING FROM BACK TO SIDE WHILE IN FLAT BAD: A LITTLE
CLIMB 3 TO 5 STEPS WITH RAILING: TOTAL
MOVING FROM LYING ON BACK TO SITTING ON SIDE OF FLAT BED: A LITTLE

## 2025-04-09 ASSESSMENT — FIBROSIS 4 INDEX: FIB4 SCORE: 2.16

## 2025-04-09 ASSESSMENT — PAIN DESCRIPTION - PAIN TYPE
TYPE: ACUTE PAIN

## 2025-04-09 ASSESSMENT — CHA2DS2 SCORE
CHA2DS2 VASC SCORE: 8
AGE 75 OR GREATER: YES
CHF OR LEFT VENTRICULAR DYSFUNCTION: NO
PRIOR STROKE OR TIA OR THROMBOEMBOLISM: YES
HYPERTENSION: YES
VASCULAR DISEASE: YES
AGE 65 TO 74: NO
SEX: FEMALE
DIABETES: YES

## 2025-04-09 ASSESSMENT — GAIT ASSESSMENTS
GAIT LEVEL OF ASSIST: MINIMAL ASSIST
DISTANCE (FEET): 10
ASSISTIVE DEVICE: FRONT WHEEL WALKER

## 2025-04-09 NOTE — ED NOTES
"Patient's home medications have been reviewed by the pharmacy team.     Past Medical History:   Diagnosis Date    Allergy     Anemia     \"In the past\"    Blood clotting disorder (HCC) 03/09/2018    Left eye 2 years ago.    Breath shortness 03/09/2018    Occasional with exertion    Cataract     bilateral IOL    Cold 03/09/2018    Pt states feels like there may be phlem building up in her throat, feels like \"I might be coming down with something\".  Pt instructed to call Dr. Rasmussen's office if she develops a sore throat, cold and/or fever.  Pt verbalized an understanding.    Dental disorder     lower partial denture    Diabetes     oral medication    High cholesterol     Hypercholesteremia     Hypertension     Hypothyroid     thyroidectomy    Renal disorder 1990's    stones       Patient's Medications   New Prescriptions    No medications on file   Previous Medications    APIXABAN (ELIQUIS) 5MG TAB    Take 1 Tablet by mouth 2 times a day.    ATENOLOL (TENORMIN) 25 MG TAB    Take 1 Tablet by mouth every day.    DORZOLAMIDE-TIMOLOL (COSOPT) 2-0.5 % SOLUTION    Administer 1 Drop into the left eye 2 times a day.    FUROSEMIDE (LASIX) 20 MG TAB    Take 1-2 Tablets by mouth 1 time a day as needed (leg swelling).    LEVOTHYROXINE (SYNTHROID) 50 MCG TAB    Take 1 Tablet by mouth every morning on an empty stomach.    METFORMIN (GLUCOPHAGE) 500 MG TAB    Take 1 Tablet by mouth every day.    SIMVASTATIN (ZOCOR) 20 MG TAB    Take 1 Tablet by mouth every evening.    SPIRONOLACTONE (ALDACTONE) 25 MG TAB    Take 0.5 Tablets by mouth every day.    VALSARTAN (DIOVAN) 160 MG TAB    Take 1 Tablet by mouth every day.   Modified Medications    No medications on file   Discontinued Medications    ASCORBIC ACID (VITAMIN C PO)    Take 1 Tablet by mouth 4 times a day.    B COMPLEX-C (SUPER B COMPLEX PO)    Take 1 Tablet by mouth every morning.    CALCIUM CITRATE (CITRACAL PO)    Take 1 Tablet by mouth every morning.    MULTIVITAMIN TAB    Take " 1 Tablet by mouth every morning.          A:  Medications do not appear to be contributing to current complaints.     P:    Will not initiate atenolol or spironolactone as patient has not started taking these medications yet. Other home medications have been reordered as appropriate.    Juan Francisco Monzon, PharmD

## 2025-04-09 NOTE — ED NOTES
Pt repositioned and resting on stretcher in no acute distress. Equal chest rise noted. VS stable on monitor. Bed locked and in lowest position. Call bell within reach.

## 2025-04-09 NOTE — ED NOTES
Bedside report received from off going RN/tech: Megan FROST, assumed care of patient.  POC discussed with patient. Call light within reach, all needs addressed at this time.       Fall risk interventions in place: Move the patient closer to the nurse's station, Patient's personal possessions are with in their safe reach, Place socks on patient, Place fall risk sign on patient's door, Give patient urinal if applicable, Keep floor surfaces clean and dry, and Accompanied to restroom (all applicable per [place] Fall risk assessment)   Continuous monitoring: Cardiac Leads, Pulse Ox, or Blood Pressure  IVF/IV medications: Not Applicable   Oxygen: Room Air  Bedside sitter: Not Applicable   Isolation: Not Applicable

## 2025-04-09 NOTE — DISCHARGE PLANNING
CM met with pt to present SNF choice, [facility], faxed to [facility]PA for completion.'Discussed with RN and PT, PT recommending SNF. Has qualifying stay, pt will update her daughters.

## 2025-04-09 NOTE — ED PROVIDER NOTES
ED Provider Note    CHIEF COMPLAINT  Chief Complaint   Patient presents with    Knee Pain     BIBA from home. Left knee pain that has worsened with walking or putting pressure on it. Patient denies any injuries or falls. Patient states was seen a week ago for a potential blood clot in her left leg but they didn't find one.       EXTERNAL RECORDS REVIEWED  Outpatient Notes patient saw cardiology yesterday for paroxysmal atrial fibrillation.  She is on atenolol and spironolactone    HPI/ROS  LIMITATION TO HISTORY   Select: : None  OUTSIDE HISTORIAN(S):   none    Camelia Frederick is a 89 y.o. female who presents complaining of left knee pain that has been worsening since this morning.  She states that she was in the hospital and discharged home and stayed with her friend for a few days and just went home last night.  She woke up this morning and had a lot of pain with walking and putting pressure on her left knee.  She denies any falls or trauma but has bruising to her left anterior knee as well as the side.  She states that this morning she had so much pain walking on her left leg that she could not really ambulate well.  She came here for further evaluation.  She denies any numbness or tingling distal to the injury.    PAST MEDICAL HISTORY   has a past medical history of Allergy, Anemia, Blood clotting disorder (HCC) (03/09/2018), Breath shortness (03/09/2018), Cataract, Cold (03/09/2018), Dental disorder, Diabetes, High cholesterol, Hypercholesteremia, Hypertension, Hypothyroid, and Renal disorder (1990's).    SURGICAL HISTORY   has a past surgical history that includes tonsillectomy (01/01/1944); cataract extraction with iol (Bilateral, 2015/2016); eye surgery (Left, 01/01/2015); thyroidectomy (09/01/1980); lithotripsy (09/01/1980); ptosis repair (Bilateral, 10/05/2016); penetrating keratoplasty (Left, 03/13/2018); appendectomy; and open reduction.    FAMILY HISTORY  Family History   Problem Relation Age of  "Onset    Diabetes Sister     Heart Attack Neg Hx     Heart Disease Neg Hx        SOCIAL HISTORY  Social History     Tobacco Use    Smoking status: Never    Smokeless tobacco: Never   Vaping Use    Vaping status: Never Used   Substance and Sexual Activity    Alcohol use: Yes     Comment: Occ    Drug use: No    Sexual activity: Not on file       CURRENT MEDICATIONS  Home Medications       Reviewed by Megan Wong R.N. (Registered Nurse) on 04/09/25 at 0926  Med List Status: Not Addressed     Medication Last Dose Status   apixaban (ELIQUIS) 5mg Tab  Active   Ascorbic Acid (VITAMIN C PO)  Active   atenolol (TENORMIN) 25 MG Tab  Active   B Complex-C (SUPER B COMPLEX PO)  Active   Calcium Citrate (CITRACAL PO)  Active   dorzolamide-timolol (COSOPT) 2-0.5 % Solution  Active   furosemide (LASIX) 20 MG Tab  Active   levothyroxine (SYNTHROID) 50 MCG Tab  Active   metFORMIN (GLUCOPHAGE) 500 MG Tab  Active   multivitamin Tab  Active   simvastatin (ZOCOR) 20 MG Tab  Active   spironolactone (ALDACTONE) 25 MG Tab  Active   valsartan (DIOVAN) 160 MG Tab  Active                  Audit from Redirected Encounters    **Home medications have not yet been reviewed for this encounter**         ALLERGIES  Allergies   Allergen Reactions    Codeine Nausea    Latex Rash          Nickel Rash             PHYSICAL EXAM  VITAL SIGNS: BP (!) 193/84   Pulse 88   Temp 36.4 °C (97.6 °F) (Temporal)   Ht 1.626 m (5' 4\")   Wt 71.7 kg (158 lb)   SpO2 93%   BMI 27.12 kg/m²      Constitutional: No distress  Skin: Positive healing bruises  to left anterior and left medial knee   Musculoskeletal: Left knee tenderness to palpation no bony step-offs or crepitance distally she is neurovascularly intact she is able to raise her leg against gravity at 180 degrees.  1+ edema to the left lower extremity.  Vascular: warm to touch good capillary refill   Neurologic: distally neurovascularly intact  Psychiatric: Affect normal      EKG/LABS  None  I have " independently interpreted this EKG    RADIOLOGY/PROCEDURES   I have independently interpreted the diagnostic imaging associated with this visit and am waiting the final reading from the radiologist.   My preliminary interpretation is as follows:  xray knee no fracture or dislocation positive arthritis    Radiologist interpretation:  CT-KNEE W/O PLUS RECONS LEFT   Final Result         1. Joint effusion and possible hemarthrosis.   2. Osteopenia.   3. No definite acute fracture or dislocation. If there is persistent strong clinical suspicion for an occult or intramedullary fracture or soft tissue injury, MRI may be performed to further evaluate.   4. Atherosclerosis.      DX-KNEE COMPLETE 4+ LEFT   Final Result      1.  No acute fracture or dislocation about the left knee.   2.  Probable small suprapatellar knee joint effusion.   3.  Calcific arteriopathy.      DX-TIBIA AND FIBULA LEFT   Final Result      1.  No bony abnormality. No acute or subacute fracture.   2.  Left lower extremity edema. Nonspecific.          COURSE & MEDICAL DECISION MAKING    ASSESSMENT, COURSE AND PLAN  Care Narrative: Camelia Frederick is a 89 y.o. female who presents complaining of left knee pain that has been worsening since this morning.  She states that she was in the hospital and discharged home and stayed with her friend for a few days and just went home last night.  She woke up this morning and had a lot of pain with walking and putting pressure on her left knee.  She denies any falls or trauma but has bruising to her left anterior knee as well as the side.  She states that this morning she had so much pain walking on her left leg that she could not really ambulate well.  She came here for further evaluation.  She denies any numbness or tingling distal to the injury.  On physical exam she has contusions that are healing to her left anterior and left medial knee no bony step-offs or crepitance distally she is neurovascularly  intact.  Range of motion is normal but tender.      ED OBS: Yes; I am placing the patient in to an observation status due to a diagnostic uncertainty as well as therapeutic intensity. Patient placed in observation status at  10:00am , 4/9/2025.     Observation plan is as follows: PT OT evaluation and possible placement                ADDITIONAL PROBLEMS MANAGED      DISPOSITION AND DISCUSSIONS  X-ray of the left tib-fib shows no bony abnormalities or subacute fracture she does have nonspecific left lower extremity edema.  X-ray of the left knee shows no acute fracture or dislocation with probable small suprapatellar knee joint effusion and calcific arteriopathy.    I have ordered a CT of the knee to make sure she does not have any fracture not seen on x-ray.  I placed her knee in an Ace wrap and given her Tylenol.    Patient CT shows a joint effusion and possible hemarthrosis with osteopenia no definite fracture or dislocation was seen if there is persistent strong clinical switch and an MRI is recommended.    Patient is able to bend and straighten her knee.  I have PT seeing her and she is open to adding rehab at a skilled nursing facility.  I have spoken with case management as well who will  help facilitate placement.        I have discussed management of the patient with the following physicians and JACQUELINE's:  none    Discussion of management with other QHP or appropriate source(s): PT / OT to assess the patient's mobility and safety for discharge      Escalation of care considered, and ultimately not performed:acute inpatient care management, however at this time, the patient is most appropriate for outpatient management    Barriers to care at this time, including but not limited to: Patient lives alone.     Decision tools and prescription drugs considered including, but not limited to: Pain Medications Tylenol .      FINAL DIAGNOSIS  1. Left knee pain, unspecified chronicity         Electronically signed by: Chelly  DANIEL Beaulieu M.D., 4/9/2025 9:36 AM

## 2025-04-09 NOTE — THERAPY
Physical Therapy   Initial Evaluation     Patient Name: Camelia Frederick  Age:  89 y.o., Sex:  female  Medical Record #: [id number]  Today's Date: 4/9/2025     Precautions  Precautions: Fall Risk    Assessment  Pt is a 89 year old who presents with L knee pain, inability to ambulate. Pt was recently admitted for B LE edema. Pt required increased assistance for bed mobility, transfers, and gait. Pt presents below her functional baseline and would benefit from continued physical therapy services.       Plan    Physical Therapy Initial Treatment Plan   Treatment Plan : Bed Mobility, Equipment, Family / Caregiver Training, Gait Training, Neuro Re-Education / Balance, Manual Therapy, Self Care / Home Evaluation, Stair Training, Therapeutic Activities, Therapeutic Exercise  Treatment Frequency: 3 Times per Week  Duration: Until Therapy Goals Met    DC Equipment Recommendations: Unable to determine at this time  Discharge Recommendations: Recommend post-acute placement for additional physical therapy services prior to discharge home        Objective     04/09/25 1316   Initial Contact Note    Initial Contact Note Order Received and Verified, Physical Therapy Evaluation in Progress with Full Report to Follow.   Precautions   Precautions Fall Risk   Vitals   O2 Delivery Device None - Room Air   Pain 0 - 10 Group   Therapist Pain Assessment Nurse Notified;During Activity  (L knee)   Prior Living Situation   Housing / Facility 2 Story House   Steps Into Home 2   Steps In Home   (flight, but can stay downstairs)   Equipment Owned Single Point Cane   Lives with - Patient's Self Care Capacity Alone and Able to Care For Self   Comments has strong support from friends   Prior Level of Functional Mobility   Bed Mobility Independent   Transfer Status Independent   Ambulation Independent   Ambulation Distance community   Assistive Devices Used Single Point Cane   Stairs Independent   History of Falls   Date of Last Fall   (denies)    Cognition    Level of Consciousness Alert   Active ROM Lower Body    Comments L knee flexion impaire due to pain   Strength Lower Body   Lower Body Strength  X   Gross Strength Generalized Weakness, Equal Bilaterally   Coordination Lower Body    Coordination Lower Body  WDL   Other Treatments   Other Treatments Provided Discussed strategies to increase safety with functional mobility, discussed knee wrapping, discussed use of assistive device, and fall prevention.   Balance Assessment   Sitting Balance (Static) Fair   Sitting Balance (Dynamic) Fair -   Standing Balance (Static) Fair -   Standing Balance (Dynamic) Poor +   Weight Shift Sitting Fair   Weight Shift Standing Fair   Comments FWW   Bed Mobility    Supine to Sit Minimal Assist   Sit to Supine Minimal Assist   Scooting Minimal Assist   Gait Analysis   Gait Level Of Assist Minimal Assist   Assistive Device Front Wheel Walker   Distance (Feet) 10   # of Times Distance was Traveled 1   Deviation Step To;Antalgic;Decreased Base Of Support;Shuffled Gait;Decreased Heel Strike;Bradykinetic;Decreased Toe Off   Weight Bearing Status no restrictions   Functional Mobility   Sit to Stand Minimal Assist   Bed, Chair, Wheelchair Transfer Minimal Assist   Toilet Transfers Minimal Assist  (BSC)   Mobility bed mob, BSC, gait, BTB   6 Clicks Assessment - How much HELP from from another person do you currently need... (If the patient hasn't done an activity recently, how much help from another person do you think he/she would need if he/she tried?)   Turning from your back to your side while in a flat bed without using bedrails? 3   Moving from lying on your back to sitting on the side of a flat bed without using bedrails? 3   Moving to and from a bed to a chair (including a wheelchair)? 3   Standing up from a chair using your arms (e.g., wheelchair, or bedside chair)? 3   Walking in hospital room? 3   Climbing 3-5 steps with a railing? 1   6 clicks Mobility Score 16    Short Term Goals    Short Term Goal # 1 Pt will complete bed mobility without the use of bed features with SPV within 6 visits to demonstrate improved functional mobility   Short Term Goal # 2 Pt will complete transfers with SPV  with LRAD within 6 visits to demonstrate improved functional mobility   Short Term Goal # 3 Pt will ambulate 200 feet with SBA with LRAD within 6 visits to demonstrate improved functional mobility   Short Term Goal # 4 Pt will ascend/descend 2 steps with SBA within 6 visits to demonstrate home access   Education Group   Education Provided Role of Physical Therapist   Role of Physical Therapist Patient Response Patient;Acceptance;Explanation;Demonstration;Verbal Demonstration;Action Demonstration   Physical Therapy Initial Treatment Plan    Treatment Plan  Bed Mobility;Equipment;Family / Caregiver Training;Gait Training;Neuro Re-Education / Balance;Manual Therapy;Self Care / Home Evaluation;Stair Training;Therapeutic Activities;Therapeutic Exercise   Treatment Frequency 3 Times per Week   Duration Until Therapy Goals Met   Problem List    Problems Impaired Bed Mobility;Pain;Impaired Transfers;Impaired Ambulation;Functional ROM Deficit;Functional Strength Deficit;Impaired Balance;Impaired Coordination;Decreased Activity Tolerance;Safety Awareness Deficits / Cognition;Motor Planning / Sequencing   Anticipated Discharge Equipment and Recommendations   DC Equipment Recommendations Unable to determine at this time   Discharge Recommendations Recommend post-acute placement for additional physical therapy services prior to discharge home   Interdisciplinary Plan of Care Collaboration   IDT Collaboration with  ;Physician;Nursing   Patient Position at End of Therapy In Bed;Tray Table within Reach;Call Light within Reach;Phone within Reach   Collaboration Comments PT eval   Session Information   Date / Session Number  4/9-1(1/3, 4/15)

## 2025-04-09 NOTE — DISCHARGE PLANNING
1445  Received Choice Form at: 1329 pm  Agency/Facility Name: [facility]  Sent Referral per Choice Form at: 1445 pm    1st choice [facility]  2nd choice [facility]  3rd choice [facility]

## 2025-04-09 NOTE — ED TRIAGE NOTES
Chief Complaint   Patient presents with    Knee Pain     BIBA from home. Left knee pain that has worsened with walking or putting pressure on it. Patient denies any injuries or falls. Patient states was seen a week ago for a potential blood clot in her left leg but they didn't find one.     CMS intact. Swelling and bruising noted around knee.  Per EMS, GSC 15.

## 2025-04-09 NOTE — ED NOTES
Medication history reviewed with patient at bedside.   Med rec is complete  Allergies reviewed.     Patient has not had any outpatient antibiotics in the last 30 days.   Anticoagulants: Eliquis 5mg- Last dose 4/8/25 HS    Dispense history available in EPIC: Yes    Baldemar Giron

## 2025-04-10 VITALS
TEMPERATURE: 97.6 F | SYSTOLIC BLOOD PRESSURE: 136 MMHG | OXYGEN SATURATION: 93 % | HEART RATE: 77 BPM | HEIGHT: 64 IN | BODY MASS INDEX: 26.98 KG/M2 | DIASTOLIC BLOOD PRESSURE: 71 MMHG | WEIGHT: 158 LBS | RESPIRATION RATE: 16 BRPM

## 2025-04-10 PROCEDURE — A9270 NON-COVERED ITEM OR SERVICE: HCPCS | Performed by: EMERGENCY MEDICINE

## 2025-04-10 PROCEDURE — 700102 HCHG RX REV CODE 250 W/ 637 OVERRIDE(OP): Performed by: EMERGENCY MEDICINE

## 2025-04-10 RX ADMIN — IBUPROFEN 600 MG: 600 TABLET, FILM COATED ORAL at 02:33

## 2025-04-10 RX ADMIN — LEVOTHYROXINE SODIUM 50 MCG: 0.05 TABLET ORAL at 05:16

## 2025-04-10 RX ADMIN — METFORMIN HYDROCHLORIDE 500 MG: 500 TABLET ORAL at 05:16

## 2025-04-10 RX ADMIN — DORZOLAMIDE HYDROCHLORIDE AND TIMOLOL MALEATE 1 DROP: 20; 5 SOLUTION/ DROPS OPHTHALMIC at 05:17

## 2025-04-10 RX ADMIN — APIXABAN 5 MG: 5 TABLET, FILM COATED ORAL at 05:16

## 2025-04-10 RX ADMIN — ACETAMINOPHEN 650 MG: 325 TABLET ORAL at 02:33

## 2025-04-10 RX ADMIN — SIMVASTATIN 20 MG: 40 TABLET, FILM COATED ORAL at 05:16

## 2025-04-10 RX ADMIN — VALSARTAN 160 MG: 80 TABLET, FILM COATED ORAL at 05:17

## 2025-04-10 ASSESSMENT — PAIN DESCRIPTION - PAIN TYPE: TYPE: ACUTE PAIN

## 2025-04-10 NOTE — DISCHARGE PLANNING
MSW spoke to Sherlyn at [facility]. She can accept pt today. MSW scheduled transport with [facility] for 1130. Res#[id number] Cost $137.76. MSW updated pt. Pt signed choice and COBRA. Packet placed on chart. Bedside RN and ERP updated. Bedside RN requested to do nurse to nurse. Sherlyn stated she can pull medication from pt's d/c summary on last admission.

## 2025-04-10 NOTE — ED NOTES
Bedside report received from SANTOSH Ch. Assumed care of patient.  POC discussed with patient. Call light within reach, all needs addressed at this time.       Fall risk interventions in place: Move the patient closer to the nurse's station, Patient's personal possessions are with in their safe reach, Place socks on patient, Place fall risk sign on patient's door, Keep floor surfaces clean and dry, and Accompanied to restroom (all applicable per [facility] Fall risk assessment)   Continuous monitoring: Pulse Ox or Blood Pressure  IVF/IV medications: Not Applicable   Oxygen: Room Air  Bedside sitter: Not Applicable   Isolation: Not Applicable

## 2025-04-10 NOTE — ED NOTES
Pt provide with pain medication per MAR. Pt denies further needs at this time, call light in reach

## 2025-04-10 NOTE — DISCHARGE PLANNING
LATE ENTRY    SW received call from Yoana with admissions at [facility] stating pt has been accepted to both [facility] and [facility] for today 4/10/25. Sherlyn verified that pt does have a 3-midnight stay.

## 2025-04-10 NOTE — DISCHARGE SUMMARY
"  ED Observation Discharge Summary    Patient:Camelia Frederick  Patient : 1932  Patient MRN: [id number]  Patient PCP: Kevin Bryson M.D.    Admit Date: 2025  Discharge Date and Time: 04/10/25 2:14 PM  Discharge Diagnosis:   1. Left knee pain, unspecified chronicity    Discharge Attending: Chelly Beaulieu M.D.  Discharge Service: ED Observation    ED Course  Camelia is a 89 y.o. female who was evaluated at [facility] for knee pain.  She had a workup and no fracture was found.  She is having trouble ambulating at home so she was evaluated for admission to a skilled nursing facility    Discharge Exam:  /71   Pulse 77   Temp 36.4 °C (97.6 °F) (Temporal)   Resp 16   Ht 1.626 m (5' 4\")   Wt 71.7 kg (158 lb)   SpO2 93%   BMI 27.12 kg/m² .    Constitutional: Awake and alert. Nontoxic  HENT:  Grossly normal  Eyes: Grossly normal  Neck: Normal range of motion  Cardiovascular: Normal heart rate   Thorax & Lungs: No respiratory distress  Abdomen: Nontender  Skin:  No pathologic rash.   Extremities: Well perfused  Psychiatric: Affect normal    Labs  Results for orders placed or performed during the hospital encounter of 25   CBC WITH DIFFERENTIAL    Collection Time: 25  9:33 AM   Result Value Ref Range    WBC 6.8 4.8 - 10.8 K/uL    RBC 4.59 4.20 - 5.40 M/uL    Hemoglobin 14.2 12.0 - 16.0 g/dL    Hematocrit 43.6 37.0 - 47.0 %    MCV 95.0 81.4 - 97.8 fL    MCH 30.9 27.0 - 33.0 pg    MCHC 32.6 32.2 - 35.5 g/dL    RDW 46.5 35.9 - 50.0 fL    Platelet Count 351 164 - 446 K/uL    MPV 11.1 9.0 - 12.9 fL    Neutrophils-Polys 55.20 44.00 - 72.00 %    Lymphocytes 34.70 22.00 - 41.00 %    Monocytes 8.30 0.00 - 13.40 %    Eosinophils 0.70 0.00 - 6.90 %    Basophils 1.00 0.00 - 1.80 %    Immature Granulocytes 0.10 0.00 - 0.90 %    Nucleated RBC 0.00 0.00 - 0.20 /100 WBC    Neutrophils (Absolute) 3.74 1.82 - 7.42 K/uL    Lymphs (Absolute) 2.35 1.00 - 4.80 K/uL    Monos (Absolute) 0.56 " 0.00 - 0.85 K/uL    Eos (Absolute) 0.05 0.00 - 0.51 K/uL    Baso (Absolute) 0.07 0.00 - 0.12 K/uL    Immature Granulocytes (abs) 0.01 0.00 - 0.11 K/uL    NRBC (Absolute) 0.00 K/uL   Basic Metabolic Panel    Collection Time: 04/09/25  9:33 AM   Result Value Ref Range    Sodium 139 135 - 145 mmol/L    Potassium 4.2 3.6 - 5.5 mmol/L    Chloride 103 96 - 112 mmol/L    Co2 25 20 - 33 mmol/L    Glucose 122 (H) 65 - 99 mg/dL    Bun 16 8 - 22 mg/dL    Creatinine 0.68 0.50 - 1.40 mg/dL    Calcium 9.0 8.5 - 10.5 mg/dL    Anion Gap 11.0 7.0 - 16.0   ESTIMATED GFR    Collection Time: 04/09/25  9:33 AM   Result Value Ref Range    GFR (CKD-EPI) 81 >60 mL/min/1.73 m 2       Radiology  CT-KNEE W/O PLUS RECONS LEFT   Final Result         1. Joint effusion and possible hemarthrosis.   2. Osteopenia.   3. No definite acute fracture or dislocation. If there is persistent strong clinical suspicion for an occult or intramedullary fracture or soft tissue injury, MRI may be performed to further evaluate.   4. Atherosclerosis.      DX-KNEE COMPLETE 4+ LEFT   Final Result      1.  No acute fracture or dislocation about the left knee.   2.  Probable small suprapatellar knee joint effusion.   3.  Calcific arteriopathy.      DX-TIBIA AND FIBULA LEFT   Final Result      1.  No bony abnormality. No acute or subacute fracture.   2.  Left lower extremity edema. Nonspecific.          Medications:   Discharge Medication List as of 4/10/2025 11:24 AM          My final assessment includes evaluation of her knee pain and screening labs.  Patient has been accepted to a skilled nursing facility  Upon Reevaluation, the patient's condition has: Improved; and will be discharged.    Patient discharged from ED Observation status at 12:00pm (Time) 4/10/25 (Date).     Total time spent on this ED Observation discharge encounter is < 30 Minutes    Electronically signed by: Chelly Beaulieu M.D., 4/10/2025 2:14 PM

## 2025-04-10 NOTE — ED NOTES
Pt unable to ambulate with RN to commode, pt placed on purwick for comfort. call light in reach and pt provided with warm blankets.

## 2025-04-10 NOTE — ED NOTES
Attempted to call report to [facility], transferred three separate times and unable to reach a staff member. Left voicemail with call back number for report.

## 2025-04-10 NOTE — ED NOTES
Bedside report received from off going RN/tech: Caitlin, assumed care of patient.  POC discussed with patient. Call light within reach, all needs addressed at this time.       Fall risk interventions in place: Move the patient closer to the nurse's station, Patient's personal possessions are with in their safe reach, Place socks on patient, Place fall risk sign on patient's door, Keep floor surfaces clean and dry, Accompanied to restroom, and Bed Alarm in use (all applicable per [facility] Fall risk assessment)   Continuous monitoring: Pulse Ox or Blood Pressure  IVF/IV medications: Not Applicable   Oxygen: Room Air  Bedside sitter: Not Applicable   Isolation: Not Applicable

## 2025-04-22 NOTE — Clinical Note
REFERRAL APPROVAL NOTICE         Sent on April 22, 2025                   Camelia Frederick  [address] [place] [address]                   Dear Ms. Frederick,    After a careful review of the medical information and benefit coverage, [facility] has processed your referral. See below for additional details.    If applicable, you must be actively enrolled with your insurance for coverage of the authorized service. If you have any questions regarding your coverage, please contact your insurance directly.    REFERRAL INFORMATION   Referral #:  [id number]  Referred-To Department    Referred-By Provider:  Surgical Oncology    Jerica Wolf M.D.   [facility]      [address]  [place] [address]  [phone] [address], [address]  [place] [id number] [phone]    Referral Start Date:  04/22/2025  Referral End Date:   04/22/2026             SCHEDULING  If you do not already have an appointment, please call [phone] to make an appointment.     MORE INFORMATION  If you do not already have a [facility] account, sign up at: [email or web address]  You can access your medical information, make appointments, see lab results, billing information, and more.  If you have questions regarding this referral, please contact  the [facility] Referrals department at:             [phone]. Monday - Friday 8:00AM - 5:00PM.     Sincerely,    [facility]

## 2025-05-08 ENCOUNTER — APPOINTMENT (OUTPATIENT)
Dept: MEDICAL GROUP | Facility: CLINIC | Age: OVER 89
End: 2025-05-08
Payer: MEDICARE

## 2025-05-30 DIAGNOSIS — I50.32 CHRONIC HEART FAILURE WITH PRESERVED EJECTION FRACTION (HCC): ICD-10-CM

## 2025-05-30 RX ORDER — SPIRONOLACTONE 25 MG/1
12.5 TABLET ORAL DAILY
Qty: 45 TABLET | Refills: 3 | OUTPATIENT
Start: 2025-05-30

## 2025-06-07 ENCOUNTER — HOSPITAL ENCOUNTER (OUTPATIENT)
Facility: MEDICAL CENTER | Age: OVER 89
End: 2025-06-08
Attending: EMERGENCY MEDICINE | Admitting: FAMILY MEDICINE
Payer: MEDICARE

## 2025-06-07 ENCOUNTER — APPOINTMENT (OUTPATIENT)
Dept: RADIOLOGY | Facility: MEDICAL CENTER | Age: OVER 89
End: 2025-06-07
Attending: EMERGENCY MEDICINE
Payer: MEDICARE

## 2025-06-07 DIAGNOSIS — M25.562 CHRONIC PAIN OF LEFT KNEE: ICD-10-CM

## 2025-06-07 DIAGNOSIS — E03.9 ACQUIRED HYPOTHYROIDISM: ICD-10-CM

## 2025-06-07 DIAGNOSIS — R44.1 HALLUCINATION, VISUAL: Primary | ICD-10-CM

## 2025-06-07 DIAGNOSIS — G89.29 CHRONIC PAIN OF LEFT KNEE: ICD-10-CM

## 2025-06-07 PROBLEM — R44.3 HALLUCINATIONS: Status: ACTIVE | Noted: 2025-06-07

## 2025-06-07 PROBLEM — M25.062 HEMARTHROSIS INVOLVING KNEE JOINT, LEFT: Status: ACTIVE | Noted: 2025-06-07

## 2025-06-07 LAB
ALBUMIN SERPL BCP-MCNC: 3.9 G/DL (ref 3.2–4.9)
ALBUMIN/GLOB SERPL: 1.3 G/DL
ALP SERPL-CCNC: 46 U/L (ref 30–99)
ALT SERPL-CCNC: 14 U/L (ref 2–50)
AMMONIA PLAS-SCNC: 26 UMOL/L (ref 11–45)
ANION GAP SERPL CALC-SCNC: 14 MMOL/L (ref 7–16)
APPEARANCE UR: CLEAR
AST SERPL-CCNC: 36 U/L (ref 12–45)
BASOPHILS # BLD AUTO: 0.7 % (ref 0–1.8)
BASOPHILS # BLD: 0.06 K/UL (ref 0–0.12)
BILIRUB SERPL-MCNC: 0.7 MG/DL (ref 0.1–1.5)
BILIRUB UR QL STRIP.AUTO: NEGATIVE
BUN SERPL-MCNC: 27 MG/DL (ref 8–22)
CALCIUM ALBUM COR SERPL-MCNC: 9.1 MG/DL (ref 8.5–10.5)
CALCIUM SERPL-MCNC: 9 MG/DL (ref 8.5–10.5)
CHLORIDE SERPL-SCNC: 102 MMOL/L (ref 96–112)
CO2 SERPL-SCNC: 23 MMOL/L (ref 20–33)
COLOR UR: ABNORMAL
CREAT SERPL-MCNC: 0.66 MG/DL (ref 0.5–1.4)
EOSINOPHIL # BLD AUTO: 0.01 K/UL (ref 0–0.51)
EOSINOPHIL NFR BLD: 0.1 % (ref 0–6.9)
ERYTHROCYTE [DISTWIDTH] IN BLOOD BY AUTOMATED COUNT: 46.5 FL (ref 35.9–50)
GFR SERPLBLD CREATININE-BSD FMLA CKD-EPI: 82 ML/MIN/1.73 M 2
GLOBULIN SER CALC-MCNC: 3.1 G/DL (ref 1.9–3.5)
GLUCOSE SERPL-MCNC: 144 MG/DL (ref 65–99)
GLUCOSE UR STRIP.AUTO-MCNC: NEGATIVE MG/DL
HCT VFR BLD AUTO: 39.4 % (ref 37–47)
HGB BLD-MCNC: 13.5 G/DL (ref 12–16)
IMM GRANULOCYTES # BLD AUTO: 0.03 K/UL (ref 0–0.11)
IMM GRANULOCYTES NFR BLD AUTO: 0.4 % (ref 0–0.9)
KETONES UR STRIP.AUTO-MCNC: 15 MG/DL
LEUKOCYTE ESTERASE UR QL STRIP.AUTO: NEGATIVE
LYMPHOCYTES # BLD AUTO: 1.57 K/UL (ref 1–4.8)
LYMPHOCYTES NFR BLD: 19 % (ref 22–41)
MCH RBC QN AUTO: 32.5 PG (ref 27–33)
MCHC RBC AUTO-ENTMCNC: 34.3 G/DL (ref 32.2–35.5)
MCV RBC AUTO: 94.7 FL (ref 81.4–97.8)
MICRO URNS: ABNORMAL
MONOCYTES # BLD AUTO: 0.62 K/UL (ref 0–0.85)
MONOCYTES NFR BLD AUTO: 7.5 % (ref 0–13.4)
NEUTROPHILS # BLD AUTO: 5.96 K/UL (ref 1.82–7.42)
NEUTROPHILS NFR BLD: 72.3 % (ref 44–72)
NITRITE UR QL STRIP.AUTO: NEGATIVE
NRBC # BLD AUTO: 0 K/UL
NRBC BLD-RTO: 0 /100 WBC (ref 0–0.2)
PH UR STRIP.AUTO: 5.5 [PH] (ref 5–8)
PLATELET # BLD AUTO: 300 K/UL (ref 164–446)
PMV BLD AUTO: 10.7 FL (ref 9–12.9)
POTASSIUM SERPL-SCNC: 4 MMOL/L (ref 3.6–5.5)
PROT SERPL-MCNC: 7 G/DL (ref 6–8.2)
PROT UR QL STRIP: NEGATIVE MG/DL
RBC # BLD AUTO: 4.16 M/UL (ref 4.2–5.4)
RBC UR QL AUTO: NEGATIVE
SODIUM SERPL-SCNC: 139 MMOL/L (ref 135–145)
SP GR UR STRIP.AUTO: 1.02
T4 FREE SERPL-MCNC: 1.51 NG/DL (ref 0.93–1.7)
TROPONIN T SERPL-MCNC: 21 NG/L (ref 6–19)
TSH SERPL-ACNC: 9.18 UIU/ML (ref 0.38–5.33)
UROBILINOGEN UR STRIP.AUTO-MCNC: 0.2 EU/DL
WBC # BLD AUTO: 8.3 K/UL (ref 4.8–10.8)

## 2025-06-07 PROCEDURE — 81003 URINALYSIS AUTO W/O SCOPE: CPT

## 2025-06-07 PROCEDURE — G0378 HOSPITAL OBSERVATION PER HR: HCPCS

## 2025-06-07 PROCEDURE — 70450 CT HEAD/BRAIN W/O DYE: CPT

## 2025-06-07 PROCEDURE — 84439 ASSAY OF FREE THYROXINE: CPT

## 2025-06-07 PROCEDURE — 80053 COMPREHEN METABOLIC PANEL: CPT

## 2025-06-07 PROCEDURE — 71045 X-RAY EXAM CHEST 1 VIEW: CPT

## 2025-06-07 PROCEDURE — 84484 ASSAY OF TROPONIN QUANT: CPT

## 2025-06-07 PROCEDURE — 36415 COLL VENOUS BLD VENIPUNCTURE: CPT

## 2025-06-07 PROCEDURE — 73562 X-RAY EXAM OF KNEE 3: CPT | Mod: LT

## 2025-06-07 PROCEDURE — 82140 ASSAY OF AMMONIA: CPT

## 2025-06-07 PROCEDURE — 85025 COMPLETE CBC W/AUTO DIFF WBC: CPT

## 2025-06-07 PROCEDURE — 84443 ASSAY THYROID STIM HORMONE: CPT

## 2025-06-07 PROCEDURE — 99285 EMERGENCY DEPT VISIT HI MDM: CPT

## 2025-06-07 RX ORDER — LEVOTHYROXINE SODIUM 50 UG/1
50 TABLET ORAL
Status: DISCONTINUED | OUTPATIENT
Start: 2025-06-08 | End: 2025-06-08

## 2025-06-07 RX ORDER — SPIRONOLACTONE 25 MG/1
12.5 TABLET ORAL DAILY
Status: DISCONTINUED | OUTPATIENT
Start: 2025-06-08 | End: 2025-06-08 | Stop reason: HOSPADM

## 2025-06-07 RX ORDER — FUROSEMIDE 20 MG/1
20 TABLET ORAL
Status: DISCONTINUED | OUTPATIENT
Start: 2025-06-07 | End: 2025-06-08 | Stop reason: HOSPADM

## 2025-06-07 RX ORDER — ACETAMINOPHEN 325 MG/1
650 TABLET ORAL EVERY 6 HOURS PRN
Status: DISCONTINUED | OUTPATIENT
Start: 2025-06-07 | End: 2025-06-08 | Stop reason: HOSPADM

## 2025-06-07 RX ORDER — ATENOLOL 25 MG/1
25 TABLET ORAL DAILY
Status: DISCONTINUED | OUTPATIENT
Start: 2025-06-08 | End: 2025-06-08 | Stop reason: HOSPADM

## 2025-06-07 RX ORDER — VALSARTAN 80 MG/1
160 TABLET ORAL DAILY
Status: DISCONTINUED | OUTPATIENT
Start: 2025-06-08 | End: 2025-06-08 | Stop reason: HOSPADM

## 2025-06-07 RX ORDER — IBUPROFEN 600 MG/1
600 TABLET, FILM COATED ORAL EVERY 6 HOURS PRN
Status: DISCONTINUED | OUTPATIENT
Start: 2025-06-07 | End: 2025-06-08 | Stop reason: HOSPADM

## 2025-06-07 RX ORDER — SIMVASTATIN 20 MG
20 TABLET ORAL EVERY EVENING
Status: DISCONTINUED | OUTPATIENT
Start: 2025-06-07 | End: 2025-06-08 | Stop reason: HOSPADM

## 2025-06-07 ASSESSMENT — CHA2DS2 SCORE
CHA2DS2 VASC SCORE: 7
AGE 65 TO 74: NO
PRIOR STROKE OR TIA OR THROMBOEMBOLISM: YES
VASCULAR DISEASE: NO
CHF OR LEFT VENTRICULAR DYSFUNCTION: NO
SEX: FEMALE
DIABETES: YES
AGE 75 OR GREATER: YES
HYPERTENSION: YES

## 2025-06-07 ASSESSMENT — COGNITIVE AND FUNCTIONAL STATUS - GENERAL
DRESSING REGULAR LOWER BODY CLOTHING: A LITTLE
MOVING FROM LYING ON BACK TO SITTING ON SIDE OF FLAT BED: A LITTLE
DAILY ACTIVITIY SCORE: 21
STANDING UP FROM CHAIR USING ARMS: A LITTLE
SUGGESTED CMS G CODE MODIFIER DAILY ACTIVITY: CJ
WALKING IN HOSPITAL ROOM: A LITTLE
MOVING TO AND FROM BED TO CHAIR: A LITTLE
TOILETING: A LITTLE
SUGGESTED CMS G CODE MODIFIER MOBILITY: CK
CLIMB 3 TO 5 STEPS WITH RAILING: A LOT
HELP NEEDED FOR BATHING: A LITTLE
TURNING FROM BACK TO SIDE WHILE IN FLAT BAD: A LITTLE
MOBILITY SCORE: 17

## 2025-06-07 ASSESSMENT — SOCIAL DETERMINANTS OF HEALTH (SDOH)
WITHIN THE LAST YEAR, HAVE YOU BEEN KICKED, HIT, SLAPPED, OR OTHERWISE PHYSICALLY HURT BY YOUR PARTNER OR EX-PARTNER?: NO
WITHIN THE PAST 12 MONTHS, THE FOOD YOU BOUGHT JUST DIDN'T LAST AND YOU DIDN'T HAVE MONEY TO GET MORE: NEVER TRUE
WITHIN THE LAST YEAR, HAVE YOU BEEN KICKED, HIT, SLAPPED, OR OTHERWISE PHYSICALLY HURT BY YOUR PARTNER OR EX-PARTNER?: NO
WITHIN THE PAST 12 MONTHS, YOU WORRIED THAT YOUR FOOD WOULD RUN OUT BEFORE YOU GOT THE MONEY TO BUY MORE: NEVER TRUE
WITHIN THE LAST YEAR, HAVE TO BEEN RAPED OR FORCED TO HAVE ANY KIND OF SEXUAL ACTIVITY BY YOUR PARTNER OR EX-PARTNER?: NO
WITHIN THE LAST YEAR, HAVE YOU BEEN AFRAID OF YOUR PARTNER OR EX-PARTNER?: NO
WITHIN THE LAST YEAR, HAVE YOU BEEN HUMILIATED OR EMOTIONALLY ABUSED IN OTHER WAYS BY YOUR PARTNER OR EX-PARTNER?: NO
IN THE PAST 12 MONTHS, HAS THE ELECTRIC, GAS, OIL, OR WATER COMPANY THREATENED TO SHUT OFF SERVICE IN YOUR HOME?: NO
WITHIN THE LAST YEAR, HAVE YOU BEEN HUMILIATED OR EMOTIONALLY ABUSED IN OTHER WAYS BY YOUR PARTNER OR EX-PARTNER?: NO

## 2025-06-07 ASSESSMENT — LIFESTYLE VARIABLES
TOTAL SCORE: 0
ON A TYPICAL DAY WHEN YOU DRINK ALCOHOL HOW MANY DRINKS DO YOU HAVE: 0
EVER HAD A DRINK FIRST THING IN THE MORNING TO STEADY YOUR NERVES TO GET RID OF A HANGOVER: NO
AVERAGE NUMBER OF DAYS PER WEEK YOU HAVE A DRINK CONTAINING ALCOHOL: 0
HOW MANY TIMES IN THE PAST YEAR HAVE YOU HAD 5 OR MORE DRINKS IN A DAY: 0
HAVE YOU EVER FELT YOU SHOULD CUT DOWN ON YOUR DRINKING: NO
ALCOHOL_USE: NO
CONSUMPTION TOTAL: NEGATIVE
EVER FELT BAD OR GUILTY ABOUT YOUR DRINKING: NO
HAVE PEOPLE ANNOYED YOU BY CRITICIZING YOUR DRINKING: NO

## 2025-06-07 ASSESSMENT — FIBROSIS 4 INDEX
FIB4 SCORE: 2.95
FIB4 SCORE: 1.89

## 2025-06-07 ASSESSMENT — PAIN DESCRIPTION - PAIN TYPE: TYPE: CHRONIC PAIN

## 2025-06-07 ASSESSMENT — PATIENT HEALTH QUESTIONNAIRE - PHQ9
SUM OF ALL RESPONSES TO PHQ9 QUESTIONS 1 AND 2: 0
2. FEELING DOWN, DEPRESSED, IRRITABLE, OR HOPELESS: NOT AT ALL
1. LITTLE INTEREST OR PLEASURE IN DOING THINGS: NOT AT ALL
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
## 2025-06-08 ENCOUNTER — PHARMACY VISIT (OUTPATIENT)
Dept: PHARMACY | Facility: MEDICAL CENTER | Age: OVER 89
End: 2025-06-08
Payer: COMMERCIAL

## 2025-06-08 ENCOUNTER — APPOINTMENT (OUTPATIENT)
Dept: RADIOLOGY | Facility: MEDICAL CENTER | Age: OVER 89
End: 2025-06-08
Attending: INTERNAL MEDICINE
Payer: MEDICARE

## 2025-06-08 VITALS
DIASTOLIC BLOOD PRESSURE: 61 MMHG | HEART RATE: 73 BPM | BODY MASS INDEX: 25.37 KG/M2 | OXYGEN SATURATION: 93 % | HEIGHT: 64 IN | TEMPERATURE: 97.5 F | RESPIRATION RATE: 18 BRPM | SYSTOLIC BLOOD PRESSURE: 124 MMHG | WEIGHT: 148.59 LBS

## 2025-06-08 PROBLEM — E87.6 HYPOKALEMIA: Status: RESOLVED | Noted: 2025-03-30 | Resolved: 2025-06-08

## 2025-06-08 PROBLEM — R44.3 HALLUCINATIONS: Status: RESOLVED | Noted: 2025-06-07 | Resolved: 2025-06-08

## 2025-06-08 LAB
ANION GAP SERPL CALC-SCNC: 11 MMOL/L (ref 7–16)
BUN SERPL-MCNC: 26 MG/DL (ref 8–22)
CALCIUM SERPL-MCNC: 8.5 MG/DL (ref 8.5–10.5)
CHLORIDE SERPL-SCNC: 102 MMOL/L (ref 96–112)
CO2 SERPL-SCNC: 27 MMOL/L (ref 20–33)
CREAT SERPL-MCNC: 0.63 MG/DL (ref 0.5–1.4)
GFR SERPLBLD CREATININE-BSD FMLA CKD-EPI: 83 ML/MIN/1.73 M 2
GLUCOSE SERPL-MCNC: 101 MG/DL (ref 65–99)
MAGNESIUM SERPL-MCNC: 2 MG/DL (ref 1.5–2.5)
POTASSIUM SERPL-SCNC: 3.4 MMOL/L (ref 3.6–5.5)
SODIUM SERPL-SCNC: 140 MMOL/L (ref 135–145)

## 2025-06-08 PROCEDURE — 99239 HOSP IP/OBS DSCHRG MGMT >30: CPT | Mod: FS | Performed by: NURSE PRACTITIONER

## 2025-06-08 PROCEDURE — 97166 OT EVAL MOD COMPLEX 45 MIN: CPT

## 2025-06-08 PROCEDURE — 700102 HCHG RX REV CODE 250 W/ 637 OVERRIDE(OP): Performed by: INTERNAL MEDICINE

## 2025-06-08 PROCEDURE — A9270 NON-COVERED ITEM OR SERVICE: HCPCS

## 2025-06-08 PROCEDURE — RXMED WILLOW AMBULATORY MEDICATION CHARGE: Performed by: INTERNAL MEDICINE

## 2025-06-08 PROCEDURE — G0378 HOSPITAL OBSERVATION PER HR: HCPCS

## 2025-06-08 PROCEDURE — 700102 HCHG RX REV CODE 250 W/ 637 OVERRIDE(OP)

## 2025-06-08 PROCEDURE — 97535 SELF CARE MNGMENT TRAINING: CPT

## 2025-06-08 PROCEDURE — 83735 ASSAY OF MAGNESIUM: CPT

## 2025-06-08 PROCEDURE — A9270 NON-COVERED ITEM OR SERVICE: HCPCS | Performed by: INTERNAL MEDICINE

## 2025-06-08 PROCEDURE — 80048 BASIC METABOLIC PNL TOTAL CA: CPT

## 2025-06-08 PROCEDURE — 70551 MRI BRAIN STEM W/O DYE: CPT

## 2025-06-08 RX ORDER — POTASSIUM CHLORIDE 1500 MG/1
40 TABLET, EXTENDED RELEASE ORAL ONCE
Status: COMPLETED | OUTPATIENT
Start: 2025-06-08 | End: 2025-06-08

## 2025-06-08 RX ORDER — LEVOTHYROXINE SODIUM 75 UG/1
75 TABLET ORAL
Qty: 90 TABLET | Refills: 0 | Status: SHIPPED | OUTPATIENT
Start: 2025-06-08

## 2025-06-08 RX ORDER — LEVOTHYROXINE SODIUM 75 UG/1
75 TABLET ORAL
Status: DISCONTINUED | OUTPATIENT
Start: 2025-06-09 | End: 2025-06-08 | Stop reason: HOSPADM

## 2025-06-08 RX ADMIN — SIMVASTATIN 20 MG: 20 TABLET, FILM COATED ORAL at 00:17

## 2025-06-08 RX ADMIN — APIXABAN 5 MG: 5 TABLET, FILM COATED ORAL at 11:41

## 2025-06-08 RX ADMIN — APIXABAN 5 MG: 5 TABLET, FILM COATED ORAL at 00:17

## 2025-06-08 RX ADMIN — VALSARTAN 160 MG: 80 TABLET ORAL at 06:59

## 2025-06-08 RX ADMIN — METFORMIN HYDROCHLORIDE 500 MG: 500 TABLET ORAL at 06:59

## 2025-06-08 RX ADMIN — ATENOLOL 25 MG: 25 TABLET ORAL at 06:58

## 2025-06-08 RX ADMIN — POTASSIUM CHLORIDE 40 MEQ: 1500 TABLET, EXTENDED RELEASE ORAL at 06:59

## 2025-06-08 RX ADMIN — SPIRONOLACTONE 12.5 MG: 25 TABLET ORAL at 06:57

## 2025-06-08 ASSESSMENT — PATIENT HEALTH QUESTIONNAIRE - PHQ9
SUM OF ALL RESPONSES TO PHQ9 QUESTIONS 1 AND 2: 0
1. LITTLE INTEREST OR PLEASURE IN DOING THINGS: NOT AT ALL
2. FEELING DOWN, DEPRESSED, IRRITABLE, OR HOPELESS: NOT AT ALL

## 2025-06-08 ASSESSMENT — PAIN DESCRIPTION - PAIN TYPE: TYPE: CHRONIC PAIN

## 2025-06-08 ASSESSMENT — COGNITIVE AND FUNCTIONAL STATUS - GENERAL
HELP NEEDED FOR BATHING: A LOT
DAILY ACTIVITIY SCORE: 17
SUGGESTED CMS G CODE MODIFIER DAILY ACTIVITY: CK
PERSONAL GROOMING: A LITTLE
DRESSING REGULAR LOWER BODY CLOTHING: A LOT
TOILETING: A LOT

## 2025-06-08 ASSESSMENT — ACTIVITIES OF DAILY LIVING (ADL): TOILETING: INDEPENDENT

## 2025-06-08 NOTE — CARE PLAN
The patient is Stable - Low risk of patient condition declining or worsening    Shift Goals  Clinical Goals: PT/OT, monitor for hallucinations,brain imagining  Patient Goals: sleep  Family Goals: ROSALVA    Progress made toward(s) clinical / shift goals:    Problem: Knowledge Deficit - Standard  Goal: Patient and family/care givers will demonstrate understanding of plan of care, disease process/condition, diagnostic tests and medications  Description: Target End Date:  6/8/2025  1.  Patient and family/caregiver oriented to unit, equipment, visitation policy and means for communicating concern2.  Complete/review Learning Assessment3.  Assess knowledge level of disease process/condition, treatment plan, diagnostic tests and medications4.  Explain disease process/condition, treatment plan, diagnostic tests and medications  Outcome: Progressing     Problem: Fall Risk  Goal: Patient will remain free from falls  Description: Target End Date:  6/8/2025  1.  Assess for fall risk factors2.  Implement fall precautions  Outcome: Progressing

## 2025-06-08 NOTE — PROGRESS NOTES
Pt arrived to CDU. Pt ambulated from rney to bed upx1 generalized weakness.Assumed care of pt. Call light in reach. Bed in lowest position. Plan of care discussed with pt. Pt agreeing to plan of care. Communication board updated. All questions answered. Assessment completed.

## 2025-06-08 NOTE — ED NOTES
Pt resting on stretcher in NAD at this time. Connected to bedside monitor, call light in reach. Awaiting admit orders

## 2025-06-08 NOTE — ED NOTES
Med rec updated and complete. Allergies reviewed.     Pt was not able to participate in an interview.     Placed call to [facility] [phone] ( global search completed. Pt has active profile at [facility]  7th  [phone]    Active prescription for  ELIQUIS located.      No antibiotics noted.    Preferred Pharmacy  [facility] [phone]    Partial dispense report available.

## 2025-06-08 NOTE — PROGRESS NOTES
4 Eyes Skin Assessment Completed by SANTOSH Lemus and SANTOSH Stevens.    Skin assessment is primarily focused on high risk bony prominences. Pay special attention to skin beneath and around medical devices, high risk bony prominences, skin to skin areas and areas where the patient lacks sensation to feel pain and areas where the patient previously had breakdown.     Head (Occipital):  WDL   Ears (Under Medical Devices): WDL   Nose (Under Medical Devices): WDL   Mouth:  WDL   Neck: WDL   Breast/Chest:  WDL   Shoulder Blades:  WDL   Spine:   WDL   (R) Arm/Elbow/Hand: WDL   (L) Arm/Elbow/Hand: WDL   Abdomen: WDL   Pannus/Groin:  Red   Sacrum/Coccyx:   Red and Blanching   (R) Ischial Tuberosity (Sit Bones):  WDL   (L) Ischial Tuberosity (Sit Bones):  WDL   (R) Leg:  Edema, red   (L) Leg:  Red and Edema   (R) Heel:  WDL   (R) Foot/Toe: WDL edema   (L) Heel: WDL   (L) Foot/Toe:  WDL edema       DEVICES IN USE:   Respiratory Devices:  NA, patient on room air  Feeding Devices:  N/A   Lines & BP Monitoring Devices:  Peripheral IV    Orthopedic Devices:  N/A  Miscellaneous Devices:  Purewick    PROTOCOL INTERVENTIONS:   Standard/Trauma Bed:  Already in place    WOUND PHOTOS:   N/A no wounds identified    WOUND CONSULT:   N/A, no advanced wound care needs identified

## 2025-06-08 NOTE — ASSESSMENT & PLAN NOTE
Chronic, not well-controlled per previous cardiology note in April 2025.  Home regimen includes atenolol 25 mg daily and valsartan 160 mg daily.    Plan:  - Continue home meds
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
## 2025-06-08 NOTE — ASSESSMENT & PLAN NOTE
Acute, limited and self resolving, no prior history of hallucinations.  Etiology unknown however, differential includes Lewy body dementia, iatrogenic, infection, delirium and electrolyte derangement.  After a thorough chart review, I was unable to find any medications that would explain her hallucinations.  Additionally, her labs show no indication of acute infection.  CT head also did not show any acute intracranial process.    Plan:  - Admit for overnight observation  - Order DaTscan to rule out Lewy body dementia  - Will consider treating with antipsychotic medication should hallucinations recur overnight
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
## 2025-06-08 NOTE — ED NOTES
Pt up to restroom, unsteady on feet. Pt having difficulty ambulating due to knee. Two person assist back to bed. Pt forgetful- forgot that she had her phone. Said that she has friends coming to bring her her phone. RN showed pt again she had her phone in her purse

## 2025-06-08 NOTE — CARE PLAN
The patient is Stable - Low risk of patient condition declining or worsening    Shift Goals  Clinical Goals: MRI  Patient Goals: Have MRI done, rest  Family Goals: ROSALVA      Problem: Knowledge Deficit - Standard  Goal: Patient and family/care givers will demonstrate understanding of plan of care, disease process/condition, diagnostic tests and medications  Description: Target End Date:  1-3 days or as soon as patient condition allowsDocument in Patient Education1.  Patient and family/caregiver oriented to unit, equipment, visitation policy and means for communicating concern2.  Complete/review Learning Assessment3.  Assess knowledge level of disease process/condition, treatment plan, diagnostic tests and medications4.  Explain disease process/condition, treatment plan, diagnostic tests and medications  Outcome: Progressing     Problem: Fall Risk  Goal: Patient will remain free from falls  Description: Target End Date:  Prior to discharge or change in level of careDocument interventions on the [place] Fall Risk Assessment1.  Assess for fall risk factors2.  Implement fall precautions  Outcome: Progressing

## 2025-06-08 NOTE — THERAPY
Occupational Therapy   Initial Evaluation     Patient Name:  Camelia Frederick  Age:  89 y.o., Sex:  female  Medical Record #:  [id number]  Today's Date:  6/8/2025     Precautions  Medical: Fall Risk (Blind in left eye)    Assessment  Patient is 89 y.o. female admitted with acute onset of hallucinations without ALOC. CT showed no acute abnormalities; pending MRI. PMHx of TIA, T2DM, pulmonary hypertension, hypertension, paroxysmal A-fib on Eliquis, hyperlipidemia, hypothyroid and recent hemarthrosis of the left knee. Pt seen for OT eval and tx. Pt currently resides alone in a 2-story condo and reported that she had been discharged from SNF 3 days prior to admission. Pt reported that she was primarily sedentary following return home, but was independent with ADLs and most IADLs.    During OT eval, pt presented with pain as well as deficits in self-care tasks, balance, functional mobility, cognition, strength, and activity tolerance. She required CGA-mod A to complete ADLs, functional mobility, and txfs with FWW. Pt was provided education on role of acute OT, home safety, compensatory strategies to safely complete ADLs, and importance of completing frequent EOB/OOB ADLs with staff to reduce risk of deconditioning. Currently recommend post-acute placement prior to DC home, however, pt expressed preference to return home with  services. Will continue to follow for ongoing acute OT services.     Plan    Occupational Therapy Initial Treatment Plan   Treatment Interventions: Self Care / Activities of Daily Living, Adaptive Equipment, Neuro Re-Education / Balance, Therapeutic Exercises, Therapeutic Activity  Treatment Frequency: 3 Times per Week  Duration: Until Therapy Goals Met    DC Equipment Recommendations: Unable to determine at this time  Discharge Recommendations: Recommend post-acute placement for additional occupational therapy services prior to discharge home     Objective    Prior Living Situation   Prior  Services Home-Independent   Housing / Facility 2 Story Apartment / Condo   Steps Into Home 2   Steps In Home   (Full flight, however, pt has a ground floor set up. Reported that she tries to do the stairs 2x/day for exercises.)   Bathroom Set up Walk In Shower;Grab Bars;Shower Chair   Equipment Owned Front-Wheel Walker;4-Wheel Walker;Single Point Cane;Tub / Shower Seat;Grab Bar(s) In Tub / Shower;Grab Bar(s) By Toilet  (Reported that she is trying to obtain a raised toilet seat or frame.)   Lives with - Patient's Self Care Capacity Alone and Able to Care For Self   Comments Pt currently resides alone, however, reported having support from family and friends. However, stated that she is hesitant to call them as she does not want to be a burden. Reported that she had been discharged from [facility] 3 days prior to admission. Reported that while home she was primarily home bound and was taking frequent naps.   Prior Level of ADL Function   Self Feeding Independent   Grooming / Hygiene Independent   Bathing Independent   Dressing Independent   Toileting Independent  (Reported that while she was home for the 3 days between admission, she would complete toileting tasks in a canister due to difficulty completing toilet txfs. However would complete toileting tasks in the restroom.)   Prior Level of IADL Function   Medication Management Independent   Laundry Independent   Kitchen Mobility Independent   Finances Independent   Home Management Independent   Shopping Requires Assist  (Rpeorted that she will either have her groceries delivered or go when her friends are going.)   Prior Level Of Mobility Independent With Device in Community;Independent With Device in Home   Driving / Transportation Relatives / Others Provide Transportation;Utilizes Public Transportation  (Reported that she receives $60 from [facility].)   Vitals   O2 (LPM) 2   O2 Delivery Device Silicone Nasal Cannula   Vitals Comments Denied O2 needs at baseline   Pain 0  - 10 Group   Therapist Pain Assessment Post Activity Pain Same as Prior to Activity;Nurse Notified  (Not rated, reported an increase in knee pain with activity)   Cognition    Cognition / Consciousness X   Level of Consciousness Alert   Safety Awareness Impaired   New Learning Impaired   Attention Impaired   Comments Pleasant, cooperative, and receptive to education. Limited insight into deficits.   Active ROM Upper Body   Active ROM Upper Body  WDL   Dominant Hand Right   Comments Observed during functional tasks   Strength Upper Body   Upper Body Strength  X   Gross Strength Generalized Weakness, Equal Bilaterally.    Balance Assessment   Sitting Balance (Static) Fair   Sitting Balance (Dynamic) Fair   Standing Balance (Static) Fair -   Standing Balance (Dynamic) Poor +   Weight Shift Sitting Fair   Weight Shift Standing Fair   Comments w/FWW   Bed Mobility    Supine to Sit Contact Guard Assist   Sit to Supine   (Up to chair post)   Scooting Contact Guard Assist   Comments HOB elevated per pt preference   ADL Assessment   Eating Supervision   Grooming Contact Guard Assist;Standing  (Hand washing at sink)   Lower Body Dressing Moderate Assist  (Unable to tailor sit to manage socks. Able to doff brief, but required assist to don.)   Toileting Moderate Assist  (Required assist to maintain balance while completing pericar and don brief)   How much help from another person does the patient currently need...   6 Clicks Daily Activity Score 17   Functional Mobility   Sit to Stand Minimal Assist   Bed, Chair, Wheelchair Transfer Minimal Assist   Toilet Transfers Minimal Assist   Mobility Functional mobility in room and hallway to restroom; w/FWW   Activity Tolerance   Sitting in Chair Up to chair post   Sitting Edge of Bed 5 min   Standing 6 min   Patient / Family Goals   Patient / Family Goal #1 To go home   Short Term Goals   Short Term Goal # 1 Pt will complete ADL txfs with supv   Short Term Goal # 2 Pt will complete  LB dressing with supv using AE PRN   Short Term Goal # 3 Pt will complete toileting ADLs with supv   Short Term Goal # 4 Pt will complete standing g/h routine with supv   Education Group   Education Provided Home Safety;Transfers;Role of Occupational Therapist;Activities of Daily Living;Pathology of bedrest   Role of Occupational Therapist Patient Response Patient;Acceptance;Explanation;Verbal Demonstration   Home Safety Patient Response Patient;Acceptance;Explanation;Verbal Demonstration;Reinforcement Needed   Transfers Patient Response Patient;Acceptance;Explanation;Verbal Demonstration;Action Demonstration;Reinforcement Needed   ADL Patient Response Patient;Acceptance;Explanation;Verbal Demonstration;Action Demonstration;Reinforcement Needed   Pathology of Bedrest Patient Response Patient;Acceptance;Explanation;Action Demonstration;Verbal Demonstration;Reinforcement Needed

## 2025-06-08 NOTE — ED NOTES
Pt resting on stretcher in NAD at this time. Connected to bedside monitor, call light in reach. Awaiting inpt bed

## 2025-06-08 NOTE — ASSESSMENT & PLAN NOTE
Chronic, spontaneous, atraumatic, initially diagnosed on 4/9/2025 in the ED.  Patient continues to be on Eliquis and actively undergoing physical therapy.  Physical exam reassuring with no evidence of septic arthritis.    Plan:  - Order PT/OT  - Low threshold for arthrocentesis, although no evidence for procedure at this time  - Pain management

## 2025-06-08 NOTE — ED NOTES
Bedside report received from off going RN/tech: Naa  , assumed care of patient.  POC discussed with patient. Call light within reach, all needs addressed at this time.       Fall risk interventions in place: Patient's personal possessions are with in their safe reach, Place fall risk sign on patient's door, Keep floor surfaces clean and dry, and Accompanied to restroom (all applicable per [place] Fall risk assessment)   Continuous monitoring: Pulse Ox or Blood Pressure  IVF/IV medications: Not Applicable   Oxygen: Room Air  Bedside sitter: Not Applicable   Isolation: Not Applicable

## 2025-06-08 NOTE — DISCHARGE INSTRUCTIONS
FOLLOW UP ITEMS POST DISCHARGE  Please call [phone] to schedule PCP appointment for patient.    Required specialty appointments include:       Discharge Instructions per CUBA MercedesPRosa MariaRRosa MariaN.    - Follow-up with PCP s/p hospitalization  - Continue all home medications      DIET: As tolerated    ACTIVITY: As tolerated    DIAGNOSIS: Altered mental status    Return to ER if symptoms persist, chest pain, palpitations, shortness of breath, numbness, tingling, weakness, and high fevers.

## 2025-06-12 ENCOUNTER — OFFICE VISIT (OUTPATIENT)
Dept: MEDICAL GROUP | Facility: CLINIC | Age: OVER 89
End: 2025-06-12
Payer: MEDICARE

## 2025-06-12 VITALS
HEART RATE: 84 BPM | OXYGEN SATURATION: 91 % | WEIGHT: 148 LBS | BODY MASS INDEX: 25.27 KG/M2 | DIASTOLIC BLOOD PRESSURE: 78 MMHG | HEIGHT: 64 IN | SYSTOLIC BLOOD PRESSURE: 110 MMHG

## 2025-06-12 DIAGNOSIS — R54 AGE-RELATED PHYSICAL DEBILITY: Primary | ICD-10-CM

## 2025-06-12 PROCEDURE — 99215 OFFICE O/P EST HI 40 MIN: CPT | Performed by: FAMILY MEDICINE

## 2025-06-12 PROCEDURE — 3078F DIAST BP <80 MM HG: CPT | Performed by: FAMILY MEDICINE

## 2025-06-12 PROCEDURE — 3074F SYST BP LT 130 MM HG: CPT | Performed by: FAMILY MEDICINE

## 2025-06-12 ASSESSMENT — FIBROSIS 4 INDEX: FIB4 SCORE: 2.95

## 2025-06-12 NOTE — ASSESSMENT & PLAN NOTE
There are progressive concerns about Ana's ability to care for herself at home. She's got good assistance currently from neighbors and the [facility] agency.   I've encouraged her to stay in contact with family and I will remain in communication with home health.   She has a number of affairs she wants to take care of.  She is interesting in leasing her upstairs bedrooms to a caregiver if that could be arranged.  Another option would be moving in with family in [place].  Hopefully we can help organize one of these options in an timely fashion, otherwise ER visits then hospitalization then assisted living is often the way this goes.  Follow-up with me in 1 month either in person by or by televisit. I will investigate the recommended DaTscan, particularly if there are any recurrence of the hallucinations.

## 2025-08-13 ENCOUNTER — OFFICE VISIT (OUTPATIENT)
Dept: CARDIOLOGY | Facility: MEDICAL CENTER | Age: OVER 89
End: 2025-08-13
Attending: INTERNAL MEDICINE
Payer: MEDICARE

## 2025-08-13 VITALS
DIASTOLIC BLOOD PRESSURE: 82 MMHG | BODY MASS INDEX: 25.61 KG/M2 | SYSTOLIC BLOOD PRESSURE: 138 MMHG | HEART RATE: 80 BPM | WEIGHT: 150 LBS | HEIGHT: 64 IN | OXYGEN SATURATION: 92 % | RESPIRATION RATE: 18 BRPM

## 2025-08-13 DIAGNOSIS — I10 PRIMARY HYPERTENSION: ICD-10-CM

## 2025-08-13 DIAGNOSIS — D68.69 SECONDARY HYPERCOAGULABLE STATE (HCC): ICD-10-CM

## 2025-08-13 DIAGNOSIS — Z79.01 CHRONIC ANTICOAGULATION: ICD-10-CM

## 2025-08-13 DIAGNOSIS — I50.32 CHRONIC DIASTOLIC CONGESTIVE HEART FAILURE (HCC): ICD-10-CM

## 2025-08-13 DIAGNOSIS — G45.9 TIA (TRANSIENT ISCHEMIC ATTACK): ICD-10-CM

## 2025-08-13 DIAGNOSIS — E78.00 PURE HYPERCHOLESTEROLEMIA: ICD-10-CM

## 2025-08-13 DIAGNOSIS — I48.0 PAROXYSMAL ATRIAL FIBRILLATION (HCC): ICD-10-CM

## 2025-08-13 DIAGNOSIS — I65.23 BILATERAL CAROTID ARTERY STENOSIS: Primary | ICD-10-CM

## 2025-08-13 PROCEDURE — 3075F SYST BP GE 130 - 139MM HG: CPT | Performed by: INTERNAL MEDICINE

## 2025-08-13 PROCEDURE — 3079F DIAST BP 80-89 MM HG: CPT | Performed by: INTERNAL MEDICINE

## 2025-08-13 PROCEDURE — 99214 OFFICE O/P EST MOD 30 MIN: CPT | Performed by: INTERNAL MEDICINE

## 2025-08-13 PROCEDURE — 99212 OFFICE O/P EST SF 10 MIN: CPT | Performed by: INTERNAL MEDICINE

## 2025-08-13 ASSESSMENT — ENCOUNTER SYMPTOMS
NERVOUS/ANXIOUS: 0
DEPRESSION: 0
PALPITATIONS: 0
ABDOMINAL PAIN: 0
BRUISES/BLEEDS EASILY: 0
WEIGHT LOSS: 0
DIZZINESS: 0
DOUBLE VISION: 0
COUGH: 0
CARDIOVASCULAR NEGATIVE: 1
SHORTNESS OF BREATH: 0
CONSTITUTIONAL NEGATIVE: 1
CHILLS: 0
FOCAL WEAKNESS: 0
VOMITING: 0
HEADACHES: 0
GASTROINTESTINAL NEGATIVE: 1
CLAUDICATION: 0
RESPIRATORY NEGATIVE: 1
NAUSEA: 0
EYES NEGATIVE: 1
WEAKNESS: 0
BLURRED VISION: 0
FEVER: 0
NEUROLOGICAL NEGATIVE: 1
MUSCULOSKELETAL NEGATIVE: 1
PSYCHIATRIC NEGATIVE: 1
MYALGIAS: 0

## 2025-08-13 ASSESSMENT — FIBROSIS 4 INDEX: FIB4 SCORE: 2.95

## 2025-08-26 ENCOUNTER — PATIENT MESSAGE (OUTPATIENT)
Dept: MEDICAL GROUP | Facility: CLINIC | Age: OVER 89
End: 2025-08-26
Payer: MEDICARE

## 2025-08-26 DIAGNOSIS — I10 PRIMARY HYPERTENSION: ICD-10-CM

## 2025-08-26 DIAGNOSIS — I50.32 CHRONIC HEART FAILURE WITH PRESERVED EJECTION FRACTION (HCC): ICD-10-CM

## 2025-08-26 DIAGNOSIS — E03.9 ACQUIRED HYPOTHYROIDISM: ICD-10-CM

## 2025-08-26 DIAGNOSIS — I48.0 PAROXYSMAL ATRIAL FIBRILLATION (HCC): ICD-10-CM

## 2025-08-27 RX ORDER — LEVOTHYROXINE SODIUM 75 UG/1
75 TABLET ORAL
Qty: 30 TABLET | Refills: 0 | Status: SHIPPED | OUTPATIENT
Start: 2025-08-27

## 2025-08-27 RX ORDER — ATENOLOL 25 MG/1
25 TABLET ORAL DAILY
Qty: 90 TABLET | Refills: 3 | Status: SHIPPED | OUTPATIENT
Start: 2025-08-27

## 2025-08-27 RX ORDER — SPIRONOLACTONE 25 MG/1
12.5 TABLET ORAL DAILY
Qty: 30 TABLET | Refills: 3 | Status: SHIPPED | OUTPATIENT
Start: 2025-08-27

## 2025-08-27 RX ORDER — VALSARTAN 160 MG/1
160 TABLET ORAL DAILY
Qty: 30 TABLET | Refills: 0 | Status: SHIPPED | OUTPATIENT
Start: 2025-08-27

## 2025-08-29 RX ORDER — SIMVASTATIN 20 MG
20 TABLET ORAL EVERY EVENING
Qty: 30 TABLET | Refills: 0 | Status: SHIPPED | OUTPATIENT
Start: 2025-08-29

## 2025-09-10 ENCOUNTER — APPOINTMENT (OUTPATIENT)
Dept: MEDICAL GROUP | Facility: CLINIC | Age: OVER 89
End: 2025-09-10
Payer: MEDICARE

## (undated) DEVICE — NEEDLE NON SAFETY 27GA X 1-1/4 IN HYPO (100EA/BX)

## (undated) DEVICE — CANISTER SUCTION RIGID RED 1500CC (40EA/CA)

## (undated) DEVICE — CLOSURE SKIN STRIP 1/2 X 4 IN - (STERI STRIP) (50/BX 4BX/CA)

## (undated) DEVICE — MEDICINE CUP STERILE 2 OZ - (100/CA)

## (undated) DEVICE — SUCTION INSTRUMENT YANKAUER BULBOUS TIP W/O VENT (50EA/CA)

## (undated) DEVICE — GLOVE SZ 6.5 BIOGEL PI MICRO - PF LF (50PR/BX)

## (undated) DEVICE — PAD EYE GAUZE COVERED OVAL 1 5/8 X 2 5/8" STERILE"

## (undated) DEVICE — GLOVE BIOGEL PI INDICATOR SZ 6.5 SURGICAL PF LF - (50/BX 4BX/CA)

## (undated) DEVICE — SYRINGE SAFETY 3 ML 18 GA X 1 1/2 BLUNT LL (100/BX 8BX/CA)

## (undated) DEVICE — CANNULA SUB-TENONS ANESTH. 1.1X25MM 19GAX1IN (10EA/SP)

## (undated) DEVICE — SPEAR EYE SPNG 3ANG MLBL HNDL - (10/ST18ST/PK 180/PK 1PK/SP)

## (undated) DEVICE — NEEDLE  NON-SAFETY 30GA X 3/4 IN (10EA/CA)

## (undated) DEVICE — ELECTRODE 850 FOAM ADHESIVE - HYDROGEL RADIOTRNSPRNT (50/PK)

## (undated) DEVICE — PUNCH CORNEAL VACUUM 8.00

## (undated) DEVICE — SODIUM CHL IRRIGATION 0.9% 1000ML (12EA/CA)

## (undated) DEVICE — SET EXTENSION WITH 2 PORTS (48EA/CA) ***PART #2C8610 IS A SUBSTITUTE*****

## (undated) DEVICE — PACK BASIC CATARACT - (4/BX)

## (undated) DEVICE — SUTURE 10-0 NYLON (12EA/BX)

## (undated) DEVICE — SYRINGE SAFETY TB 1 ML 27 GA X 1/2 IN (100/BX 4BX/CA)

## (undated) DEVICE — TUBING CLEARLINK DUO-VENT - C-FLO (48EA/CA)

## (undated) DEVICE — LACTATED RINGERS INJ 1000 ML - (14EA/CA 60CA/PF)

## (undated) DEVICE — TIP POLYMER I&A

## (undated) DEVICE — KNIFE 2.75MM ANGL SNGL BEV/SAFETY (10/CA 10/SP)

## (undated) DEVICE — CONTAINER SPECIMEN BAG OR - STERILE 4 OZ W/LID (100EA/CA)

## (undated) DEVICE — TUBE SHILEY ENDOTRACHEAL ORAL RAE CUFFED 7.0MM WITH TAPERGUARD (10EA/PK)

## (undated) DEVICE — SENSOR SPO2 NEO LNCS ADHESIVE (20/BX) SEE USER NOTES

## (undated) DEVICE — CATHETER IV 20 GA X 1-1/4 ---SURG.& SDS ONLY--- (50EA/BX)

## (undated) DEVICE — GOWN SURGEONS LARGE - (32/CA)

## (undated) DEVICE — SET LEADWIRE 5 LEAD BEDSIDE DISPOSABLE ECG (1SET OF 5/EA)

## (undated) DEVICE — WATER IRRIGATION STERILE 1000ML (12EA/CA)

## (undated) DEVICE — CONTAINER, SPECIMEN, STERILE

## (undated) DEVICE — KIT ANESTHESIA W/CIRCUIT & 3/LT BAG W/FILTER (20EA/CA)

## (undated) DEVICE — TUBE CONNECTING SUCTION - CLEAR PLASTIC STERILE 72 IN (50EA/CA)

## (undated) DEVICE — KNIFE VITRECTOMY 20GA STRAIGHT (6EA/BX)

## (undated) DEVICE — SHIELD OPTH AL GRTR CVR FOX (50EA/BX)

## (undated) DEVICE — KIT  I.V. START (100EA/CA)